# Patient Record
Sex: MALE | Race: WHITE | NOT HISPANIC OR LATINO | Employment: FULL TIME | URBAN - METROPOLITAN AREA
[De-identification: names, ages, dates, MRNs, and addresses within clinical notes are randomized per-mention and may not be internally consistent; named-entity substitution may affect disease eponyms.]

---

## 2019-02-23 ENCOUNTER — OFFICE VISIT (OUTPATIENT)
Dept: URGENT CARE | Facility: CLINIC | Age: 59
End: 2019-02-23
Payer: COMMERCIAL

## 2019-02-23 VITALS
OXYGEN SATURATION: 99 % | SYSTOLIC BLOOD PRESSURE: 130 MMHG | WEIGHT: 219 LBS | HEART RATE: 72 BPM | RESPIRATION RATE: 18 BRPM | DIASTOLIC BLOOD PRESSURE: 80 MMHG | TEMPERATURE: 98 F

## 2019-02-23 DIAGNOSIS — L25.9 CONTACT DERMATITIS, UNSPECIFIED CONTACT DERMATITIS TYPE, UNSPECIFIED TRIGGER: Primary | ICD-10-CM

## 2019-02-23 PROCEDURE — 96372 PR INJECTION,THERAP/PROPH/DIAG2ST, IM OR SUBCUT: ICD-10-PCS | Mod: S$GLB,,, | Performed by: PHYSICIAN ASSISTANT

## 2019-02-23 PROCEDURE — 99203 PR OFFICE/OUTPT VISIT, NEW, LEVL III, 30-44 MIN: ICD-10-PCS | Mod: 25,S$GLB,, | Performed by: PHYSICIAN ASSISTANT

## 2019-02-23 PROCEDURE — 96372 THER/PROPH/DIAG INJ SC/IM: CPT | Mod: S$GLB,,, | Performed by: PHYSICIAN ASSISTANT

## 2019-02-23 PROCEDURE — 99203 OFFICE O/P NEW LOW 30 MIN: CPT | Mod: 25,S$GLB,, | Performed by: PHYSICIAN ASSISTANT

## 2019-02-23 RX ORDER — ASPIRIN 81 MG/1
81 TABLET ORAL DAILY
COMMUNITY

## 2019-02-23 RX ORDER — PANTOPRAZOLE SODIUM 40 MG/1
40 TABLET, DELAYED RELEASE ORAL DAILY
Refills: 4 | COMMUNITY
Start: 2019-01-26 | End: 2019-02-23

## 2019-02-23 RX ORDER — BETAMETHASONE SODIUM PHOSPHATE AND BETAMETHASONE ACETATE 3; 3 MG/ML; MG/ML
9 INJECTION, SUSPENSION INTRA-ARTICULAR; INTRALESIONAL; INTRAMUSCULAR; SOFT TISSUE ONCE
Status: COMPLETED | OUTPATIENT
Start: 2019-02-23 | End: 2019-02-23

## 2019-02-23 RX ORDER — ROSUVASTATIN CALCIUM 20 MG/1
20 TABLET, COATED ORAL NIGHTLY
Refills: 11 | COMMUNITY
Start: 2019-01-26

## 2019-02-23 RX ORDER — TRIAMCINOLONE ACETONIDE 0.25 MG/G
CREAM TOPICAL 2 TIMES DAILY
Qty: 15 G | Refills: 0 | Status: SHIPPED | OUTPATIENT
Start: 2019-02-23 | End: 2019-03-05

## 2019-02-23 RX ADMIN — BETAMETHASONE SODIUM PHOSPHATE AND BETAMETHASONE ACETATE 9 MG: 3; 3 INJECTION, SUSPENSION INTRA-ARTICULAR; INTRALESIONAL; INTRAMUSCULAR; SOFT TISSUE at 01:02

## 2019-02-23 NOTE — PATIENT INSTRUCTIONS
· Follow up with your primary care in 2-5 days if symptoms have not improved, or you may return here.  · If you were referred to a specialist, please follow up with that specialty.  · If you were prescribed antibiotics, please take them to completion.  · If you were prescribed a narcotic or any medication with sedative effects, do not drive or operate heavy equipment or machinery while taking these medications.  · You must understand that you have received treatment at an Urgent Care facility only, and that you may be released before all of your medical problems are known or treated. Urgent Care facilities are not equipped to handle life threatening emergencies. It is recommended that you go to an Emergency Department for further evaluation of worsening or concerning symptoms, or possibly life threatening conditions as discussed.                                        If you  smoke, please stop smoking        Understanding Contact Dermatitis     A cool, moist compress can help reduce itching.     Contact dermatitis is a common type of skin rash. Its caused by something that touches the skin and makes it irritated and inflamed. It can occur on skin on any part of the body, such as the face, neck, hands, arms, and legs. Contact dermatitis is not spread from person to person.  Often, the reaction of contact dermatitis occurs 1 to 2 days after contact with the offending agent.  How to say it  SARAH-tact cuj-rqq-GY-tis   What causes contact dermatitis?  Its caused by something that irritates the skin, or that creates an allergic reaction on the skin. People can get contact dermatitis from many kinds of things. These include:  · Plant oils in poison ivy, oak, and sumac  · Chemicals in household , solvents, and glue  · Chemicals in makeup, soap, laundry detergent, perfume, acne cream, and hair products  · Certain medicines, such as neomycin, bacitracin, benzocaine, and thimerosal  · Metals such as nickel, found in  some jewelry and watch bands   · The sticky material on the back of bandages and tape (adhesive)  · Things that can cause tiny breaks in the skin, such as wood, fiberglass, metal tools, and plant thorns  · Rubber latex in surgical gloves and other medical supplies  Dermatitis can also be caused by the skin being damp for long periods of time. This can happen from washing your hands too often, or working with wet materials.  Symptoms of contact dermatitis  Symptoms can include skin that is:  · Blistered  · Burning  · Cracked  · Dry  · Itchy  · Painful  · Red  · Rough, thickened, and leathery  · Swollen  · Warm  The blisters may ooze fluid and form crusts.  Treatment for contact dermatitis  Treatment is done to help relieve itching and reduce inflammation. The rash should go away in a few days to a few weeks. Treatments include:  · Cool, moist compress. Use a clean damp cloth. Put it on the area for 20 to 30 minutes, 5 to 6 times a day for the first 3 days.  · Steroid cream or ointment. You can apply this medicine several times a day on clean skin.  · Oral corticosteroid. Your healthcare provider may prescribe this medicine if you have severe skin symptoms on a large part of your body.  Your healthcare provider may give you a steroid injection instead of pills.  · Oral antihistamine. This medicine can help reduce itching.  · Colloidal oatmeal bath. Soaking in water with colloidal oatmeal can help soothe skin.  · Plain cream, lotion, or ointment. Cream, lotion, or ointment without medicine can help to soothe and protect your skin.  Living with contact dermatitis  Talk with your healthcare provider about what may have caused your contact dermatitis. Patch testing may help you figure out what caused the rash so you can avoid further contact with it. Once you learn what caused your rash, make sure to avoid that substance. If your skin comes into contact with it again, make sure to wash your skin right away. If you cant  avoid the substance, wear gloves or other protective clothing before you touch it. Or use a cream, lotion, or ointment to protect your skin.  When to call your healthcare provider  Call your healthcare provider right away if you have any of these:  · Fever of 100.4°F (38°C) or higher, or as directed  · Symptoms that dont get better, or get worse  · New symptoms   Date Last Reviewed: 5/1/2016  © 5591-4302 [employer]. [address] [place] [address]. All rights reserved. This information is not intended as a substitute for professional medical care. Always follow your healthcare professional's instructions.

## 2019-02-23 NOTE — PROGRESS NOTES
Subjective:       Patient ID: Mitchel Joya is a 58 y.o. male.    Vitals:  weight is 99.3 kg (219 lb). His temperature is 97.9 °F (36.6 °C). His blood pressure is 130/80 and his pulse is 72. His respiration is 18 and oxygen saturation is 99%.     Chief Complaint: Rash    Rash   This is a new problem. Episode onset: 1 week. The problem has been gradually worsening since onset. Location: bilateral hands, arms, neck. The rash is characterized by blistering, itchiness, redness, swelling and pain. Associated with: kobe secret spray  Pertinent negatives include no cough, fever or sore throat. Past treatments include anti-itch cream and topical steroids. The treatment provided no relief.       Constitution: Negative for chills and fever.   HENT: Negative for facial swelling and sore throat.    Neck: Negative for painful lymph nodes.   Eyes: Negative for eye itching and eyelid swelling.   Respiratory: Negative for cough.    Musculoskeletal: Negative for joint pain and joint swelling.   Skin: Positive for rash and erythema. Negative for color change, pale, wound, abrasion, laceration, lesion, skin thickening/induration, puncture wound, abscess, avulsion and hives.   Allergic/Immunologic: Negative for environmental allergies, immunocompromised state and hives.   Hematologic/Lymphatic: Negative for swollen lymph nodes.       Objective:      Physical Exam   Constitutional: He is oriented to person, place, and time. He appears well-developed and well-nourished.   HENT:   Head: Normocephalic and atraumatic. Head is without abrasion, without contusion and without laceration.   Right Ear: External ear normal.   Left Ear: External ear normal.   Nose: Nose normal.   Mouth/Throat: Oropharynx is clear and moist.   Eyes: Conjunctivae, EOM and lids are normal. Pupils are equal, round, and reactive to light.   Neck: Trachea normal, full passive range of motion without pain and phonation normal. Neck supple.   Cardiovascular: Normal  rate, regular rhythm and normal heart sounds.   Pulmonary/Chest: Effort normal and breath sounds normal. No stridor. No respiratory distress.   Musculoskeletal: Normal range of motion.   Neurological: He is alert and oriented to person, place, and time.   Skin: Skin is warm, dry and intact. Capillary refill takes less than 2 seconds. Rash noted. No abrasion, no bruising, no burn, no ecchymosis, no laceration and no lesion noted. Rash is maculopapular. There is erythema.        Psychiatric: He has a normal mood and affect. His speech is normal and behavior is normal. Judgment and thought content normal. Cognition and memory are normal.   Nursing note and vitals reviewed.      Assessment:       1. Contact dermatitis, unspecified contact dermatitis type, unspecified trigger        Plan:         Contact dermatitis, unspecified contact dermatitis type, unspecified trigger  -     betamethasone acetate-betamethasone sodium phosphate injection 9 mg  -     triamcinolone acetonide 0.025% (KENALOG) 0.025 % cream; Apply topically 2 (two) times daily. for 10 days  Dispense: 15 g; Refill: 0      Patient Instructions     · Follow up with your primary care in 2-5 days if symptoms have not improved, or you may return here.  · If you were referred to a specialist, please follow up with that specialty.  · If you were prescribed antibiotics, please take them to completion.  · If you were prescribed a narcotic or any medication with sedative effects, do not drive or operate heavy equipment or machinery while taking these medications.  · You must understand that you have received treatment at an Urgent Care facility only, and that you may be released before all of your medical problems are known or treated. Urgent Care facilities are not equipped to handle life threatening emergencies. It is recommended that you go to an Emergency Department for further evaluation of worsening or concerning symptoms, or possibly life threatening  conditions as discussed.                                        If you  smoke, please stop smoking        Understanding Contact Dermatitis     A cool, moist compress can help reduce itching.     Contact dermatitis is a common type of skin rash. Its caused by something that touches the skin and makes it irritated and inflamed. It can occur on skin on any part of the body, such as the face, neck, hands, arms, and legs. Contact dermatitis is not spread from person to person.  Often, the reaction of contact dermatitis occurs 1 to 2 days after contact with the offending agent.  How to say it  SARAH-tact del-lgw-ES-tis   What causes contact dermatitis?  Its caused by something that irritates the skin, or that creates an allergic reaction on the skin. People can get contact dermatitis from many kinds of things. These include:  · Plant oils in poison ivy, oak, and sumac  · Chemicals in household , solvents, and glue  · Chemicals in makeup, soap, laundry detergent, perfume, acne cream, and hair products  · Certain medicines, such as neomycin, bacitracin, benzocaine, and thimerosal  · Metals such as nickel, found in some jewelry and watch bands   · The sticky material on the back of bandages and tape (adhesive)  · Things that can cause tiny breaks in the skin, such as wood, fiberglass, metal tools, and plant thorns  · Rubber latex in surgical gloves and other medical supplies  Dermatitis can also be caused by the skin being damp for long periods of time. This can happen from washing your hands too often, or working with wet materials.  Symptoms of contact dermatitis  Symptoms can include skin that is:  · Blistered  · Burning  · Cracked  · Dry  · Itchy  · Painful  · Red  · Rough, thickened, and leathery  · Swollen  · Warm  The blisters may ooze fluid and form crusts.  Treatment for contact dermatitis  Treatment is done to help relieve itching and reduce inflammation. The rash should go away in a few days to a few  weeks. Treatments include:  · Cool, moist compress. Use a clean damp cloth. Put it on the area for 20 to 30 minutes, 5 to 6 times a day for the first 3 days.  · Steroid cream or ointment. You can apply this medicine several times a day on clean skin.  · Oral corticosteroid. Your healthcare provider may prescribe this medicine if you have severe skin symptoms on a large part of your body.  Your healthcare provider may give you a steroid injection instead of pills.  · Oral antihistamine. This medicine can help reduce itching.  · Colloidal oatmeal bath. Soaking in water with colloidal oatmeal can help soothe skin.  · Plain cream, lotion, or ointment. Cream, lotion, or ointment without medicine can help to soothe and protect your skin.  Living with contact dermatitis  Talk with your healthcare provider about what may have caused your contact dermatitis. Patch testing may help you figure out what caused the rash so you can avoid further contact with it. Once you learn what caused your rash, make sure to avoid that substance. If your skin comes into contact with it again, make sure to wash your skin right away. If you cant avoid the substance, wear gloves or other protective clothing before you touch it. Or use a cream, lotion, or ointment to protect your skin.  When to call your healthcare provider  Call your healthcare provider right away if you have any of these:  · Fever of 100.4°F (38°C) or higher, or as directed  · Symptoms that dont get better, or get worse  · New symptoms   Date Last Reviewed: 5/1/2016  © 7737-8761 [employer]. [address] [place] [address], [place] [address]. All rights reserved. This information is not intended as a substitute for professional medical care. Always follow your healthcare professional's instructions.

## 2021-12-08 ENCOUNTER — TELEPHONE (OUTPATIENT)
Dept: OTOLARYNGOLOGY | Facility: CLINIC | Age: 61
End: 2021-12-08
Payer: COMMERCIAL

## 2021-12-20 ENCOUNTER — OFFICE VISIT (OUTPATIENT)
Dept: OTOLARYNGOLOGY | Facility: CLINIC | Age: 61
End: 2021-12-20
Payer: COMMERCIAL

## 2021-12-20 VITALS — HEIGHT: 69 IN | WEIGHT: 227.31 LBS | BODY MASS INDEX: 33.67 KG/M2

## 2021-12-20 DIAGNOSIS — K21.9 LPRD (LARYNGOPHARYNGEAL REFLUX DISEASE): ICD-10-CM

## 2021-12-20 DIAGNOSIS — Z79.01 CHRONIC ANTICOAGULATION: ICD-10-CM

## 2021-12-20 DIAGNOSIS — E66.9 OBESITY (BMI 30.0-34.9): ICD-10-CM

## 2021-12-20 DIAGNOSIS — G47.33 OSA (OBSTRUCTIVE SLEEP APNEA): Primary | ICD-10-CM

## 2021-12-20 PROCEDURE — 99204 PR OFFICE/OUTPT VISIT, NEW, LEVL IV, 45-59 MIN: ICD-10-PCS | Mod: 25,S$GLB,, | Performed by: OTOLARYNGOLOGY

## 2021-12-20 PROCEDURE — 99204 OFFICE O/P NEW MOD 45 MIN: CPT | Mod: 25,S$GLB,, | Performed by: OTOLARYNGOLOGY

## 2021-12-20 PROCEDURE — 31575 DIAGNOSTIC LARYNGOSCOPY: CPT | Mod: S$GLB,,, | Performed by: OTOLARYNGOLOGY

## 2021-12-20 PROCEDURE — 31575 PR LARYNGOSCOPY, FLEXIBLE; DIAGNOSTIC: ICD-10-PCS | Mod: S$GLB,,, | Performed by: OTOLARYNGOLOGY

## 2021-12-20 PROCEDURE — 99999 PR PBB SHADOW E&M-EST. PATIENT-LVL II: CPT | Mod: PBBFAC,,, | Performed by: OTOLARYNGOLOGY

## 2021-12-20 PROCEDURE — 99999 PR PBB SHADOW E&M-EST. PATIENT-LVL II: ICD-10-PCS | Mod: PBBFAC,,, | Performed by: OTOLARYNGOLOGY

## 2021-12-20 RX ORDER — PANTOPRAZOLE SODIUM 40 MG/1
40 TABLET, DELAYED RELEASE ORAL DAILY
Qty: 30 TABLET | Refills: 11 | Status: SHIPPED | OUTPATIENT
Start: 2021-12-20 | End: 2022-12-20

## 2021-12-20 RX ORDER — RIVAROXABAN 15 MG-20MG
15 KIT ORAL 2 TIMES DAILY
COMMUNITY
Start: 2021-10-19

## 2021-12-21 PROBLEM — G47.33 OSA (OBSTRUCTIVE SLEEP APNEA): Status: ACTIVE | Noted: 2021-12-21

## 2021-12-21 PROBLEM — Z79.01 CHRONIC ANTICOAGULATION: Status: ACTIVE | Noted: 2021-12-21

## 2021-12-21 PROBLEM — E66.9 OBESITY (BMI 30.0-34.9): Status: ACTIVE | Noted: 2021-12-21

## 2021-12-21 PROBLEM — E66.811 OBESITY (BMI 30.0-34.9): Status: ACTIVE | Noted: 2021-12-21

## 2021-12-21 PROBLEM — K21.9 LPRD (LARYNGOPHARYNGEAL REFLUX DISEASE): Status: ACTIVE | Noted: 2021-12-21

## 2021-12-26 ENCOUNTER — TELEPHONE (OUTPATIENT)
Dept: PULMONOLOGY | Facility: HOSPITAL | Age: 61
End: 2021-12-26
Payer: COMMERCIAL

## 2023-08-23 ENCOUNTER — TELEPHONE (OUTPATIENT)
Dept: OTOLARYNGOLOGY | Facility: CLINIC | Age: 63
End: 2023-08-23
Payer: COMMERCIAL

## 2023-08-23 NOTE — TELEPHONE ENCOUNTER
Spoke to pt and informed Dr. Pereira is no longer with [facility]. Offered to schedule appt with another MD. Pt states he will stick with Dr. Pereira

## 2023-08-23 NOTE — TELEPHONE ENCOUNTER
----- Message from Jo Lee sent at 8/23/2023 12:47 PM CDT -----  Contact: pt  Pt is calling in regard needing the dr or nurse call him about his past visit and would like to discuss possible surgery.  Please call him back at  [phone] or work# [phone] thanks/mpd

## 2023-08-23 NOTE — TELEPHONE ENCOUNTER
----- Message from Sophia Blackmon sent at 8/23/2023  1:18 PM CDT -----  Type:  Patient Returning Call    Who Called:JASPAL JAY [[id number]]  Who Left Message for Patient:Yahaira  Does the patient know what this is regarding?:YES  Would the patient rather a call back or a response via [facility]? No   Best Call Back Number:.Telephone Information:  Mobile          [phone]      Additional Information: Pt returning call

## 2024-12-30 ENCOUNTER — TELEPHONE (OUTPATIENT)
Dept: HEMATOLOGY/ONCOLOGY | Facility: CLINIC | Age: 64
End: 2024-12-30
Payer: COMMERCIAL

## 2024-12-30 DIAGNOSIS — C90.00 MULTIPLE MYELOMA: Primary | ICD-10-CM

## 2024-12-30 NOTE — TELEPHONE ENCOUNTER
Called and spoke to Patient .  Appointment scheduled on 1/29/2025 with Dr Hemphill.  All questions and concerns addressed.   Provided my name and direct number and instructed Patient  to call with any questions and/or concerns.       VIKCI PenaN, RN-BC  BMT Nurse Navigator  [facility] [address], [place], [place] [address]  _________________________  o [phone]

## 2024-12-30 NOTE — TELEPHONE ENCOUNTER
----- Message from Sony Lobojeana sent at 12/26/2024  3:23 PM CST -----  Regarding: RE: MBP ref by Dr Rani Chance MM  I have Verified medical and transplant benefit for patient for John rosado.      Yamiljeana  ----- Message -----  From: Louis Marie RN  Sent: 12/26/2024   3:18 PM CST  To: Sony Rubén  Subject: FW: MBP ref by Dr Rani Chance MM                  Please verify car t tx benefits  ----- Message -----  From: Chris Topete  Sent: 12/26/2024   9:01 AM CST  To: Louis Marie RN  Subject: MBP ref by Dr Rani Chance MM                      Referral and provider notes w/labs  in Media       Requested via phone:  -chemo treatment plan   -pathology report   - radiology reports

## 2024-12-30 NOTE — NURSING
Oncology Navigation   Intake  Cancer Type: Myeloma  Type of Referral: External  Date of Referral: 12/28/24  Initial Nurse Navigator Contact: 12/30/24  Referral to Initial Contact Timeline (days): 2     Treatment                              Acuity      Follow Up  No follow-ups on file.

## 2025-01-09 ENCOUNTER — LAB VISIT (OUTPATIENT)
Dept: LAB | Facility: HOSPITAL | Age: 65
End: 2025-01-09
Attending: INTERNAL MEDICINE
Payer: COMMERCIAL

## 2025-01-09 DIAGNOSIS — C90.00 MULTIPLE MYELOMA: ICD-10-CM

## 2025-01-09 DIAGNOSIS — C90.00 MULTIPLE MYELOMA: Primary | ICD-10-CM

## 2025-01-09 PROCEDURE — 88325 CONSLTJ COMPRE RVW REC REPRT: CPT | Performed by: PATHOLOGY

## 2025-01-09 PROCEDURE — 88325 CONSLTJ COMPRE RVW REC REPRT: CPT | Mod: ,,, | Performed by: PATHOLOGY

## 2025-01-13 LAB
COMMENT: NORMAL
FINAL PATHOLOGIC DIAGNOSIS: NORMAL
GROSS: NORMAL
Lab: NORMAL
MICROSCOPIC EXAM: NORMAL

## 2025-01-29 ENCOUNTER — TELEPHONE (OUTPATIENT)
Dept: HEMATOLOGY/ONCOLOGY | Facility: CLINIC | Age: 65
End: 2025-01-29
Payer: COMMERCIAL

## 2025-01-29 NOTE — TELEPHONE ENCOUNTER
Pt called to ask questions about his visit with Dr Hemphill tomorrow.  All questions and concerns addressed.

## 2025-01-30 ENCOUNTER — LAB VISIT (OUTPATIENT)
Dept: LAB | Facility: HOSPITAL | Age: 65
End: 2025-01-30
Attending: INTERNAL MEDICINE
Payer: COMMERCIAL

## 2025-01-30 ENCOUNTER — OFFICE VISIT (OUTPATIENT)
Dept: HEMATOLOGY/ONCOLOGY | Facility: CLINIC | Age: 65
End: 2025-01-30
Payer: COMMERCIAL

## 2025-01-30 VITALS
SYSTOLIC BLOOD PRESSURE: 119 MMHG | OXYGEN SATURATION: 96 % | BODY MASS INDEX: 34.29 KG/M2 | HEART RATE: 69 BPM | DIASTOLIC BLOOD PRESSURE: 67 MMHG | HEIGHT: 69 IN | WEIGHT: 231.5 LBS | TEMPERATURE: 99 F

## 2025-01-30 DIAGNOSIS — C90.00 MULTIPLE MYELOMA NOT HAVING ACHIEVED REMISSION: ICD-10-CM

## 2025-01-30 DIAGNOSIS — D84.81 IMMUNODEFICIENCY DUE TO CONDITIONS CLASSIFIED ELSEWHERE: ICD-10-CM

## 2025-01-30 DIAGNOSIS — C90.00 MULTIPLE MYELOMA NOT HAVING ACHIEVED REMISSION: Primary | ICD-10-CM

## 2025-01-30 DIAGNOSIS — C90.00 MULTIPLE MYELOMA: ICD-10-CM

## 2025-01-30 DIAGNOSIS — Z76.82 STEM CELL TRANSPLANT CANDIDATE: ICD-10-CM

## 2025-01-30 LAB
ABO + RH BLD: NORMAL
ALBUMIN SERPL BCP-MCNC: 3.4 G/DL (ref 3.5–5.2)
ALP SERPL-CCNC: 90 U/L (ref 40–150)
ALT SERPL W/O P-5'-P-CCNC: 63 U/L (ref 10–44)
ANION GAP SERPL CALC-SCNC: 6 MMOL/L (ref 8–16)
AST SERPL-CCNC: 34 U/L (ref 10–40)
BASOPHILS # BLD AUTO: 0.03 K/UL (ref 0–0.2)
BASOPHILS NFR BLD: 0.5 % (ref 0–1.9)
BILIRUB SERPL-MCNC: 0.3 MG/DL (ref 0.1–1)
BLD GP AB SCN CELLS X3 SERPL QL: NORMAL
BUN SERPL-MCNC: 20 MG/DL (ref 8–23)
CALCIUM SERPL-MCNC: 8.9 MG/DL (ref 8.7–10.5)
CHLORIDE SERPL-SCNC: 108 MMOL/L (ref 95–110)
CO2 SERPL-SCNC: 27 MMOL/L (ref 23–29)
CREAT SERPL-MCNC: 0.8 MG/DL (ref 0.5–1.4)
DIFFERENTIAL METHOD BLD: ABNORMAL
EOSINOPHIL # BLD AUTO: 0.3 K/UL (ref 0–0.5)
EOSINOPHIL NFR BLD: 4.2 % (ref 0–8)
ERYTHROCYTE [DISTWIDTH] IN BLOOD BY AUTOMATED COUNT: 16 % (ref 11.5–14.5)
EST. GFR  (NO RACE VARIABLE): >60 ML/MIN/1.73 M^2
GLUCOSE SERPL-MCNC: 89 MG/DL (ref 70–110)
HCT VFR BLD AUTO: 33.6 % (ref 40–54)
HGB BLD-MCNC: 10.9 G/DL (ref 14–18)
IGA SERPL-MCNC: 22 MG/DL (ref 40–350)
IGG SERPL-MCNC: 783 MG/DL (ref 650–1600)
IGM SERPL-MCNC: 29 MG/DL (ref 50–300)
IMM GRANULOCYTES # BLD AUTO: 0.02 K/UL (ref 0–0.04)
IMM GRANULOCYTES NFR BLD AUTO: 0.3 % (ref 0–0.5)
LDH SERPL L TO P-CCNC: 214 U/L (ref 110–260)
LYMPHOCYTES # BLD AUTO: 1.5 K/UL (ref 1–4.8)
LYMPHOCYTES NFR BLD: 23.1 % (ref 18–48)
MAGNESIUM SERPL-MCNC: 2.1 MG/DL (ref 1.6–2.6)
MCH RBC QN AUTO: 30.7 PG (ref 27–31)
MCHC RBC AUTO-ENTMCNC: 32.4 G/DL (ref 32–36)
MCV RBC AUTO: 95 FL (ref 82–98)
MONOCYTES # BLD AUTO: 0.8 K/UL (ref 0.3–1)
MONOCYTES NFR BLD: 11.8 % (ref 4–15)
NEUTROPHILS # BLD AUTO: 3.8 K/UL (ref 1.8–7.7)
NEUTROPHILS NFR BLD: 60.1 % (ref 38–73)
NRBC BLD-RTO: 0 /100 WBC
PHOSPHATE SERPL-MCNC: 3.9 MG/DL (ref 2.7–4.5)
PLATELET # BLD AUTO: 171 K/UL (ref 150–450)
PMV BLD AUTO: 12.6 FL (ref 9.2–12.9)
POTASSIUM SERPL-SCNC: 3.9 MMOL/L (ref 3.5–5.1)
PROT SERPL-MCNC: 6.5 G/DL (ref 6–8.4)
RBC # BLD AUTO: 3.55 M/UL (ref 4.6–6.2)
SODIUM SERPL-SCNC: 141 MMOL/L (ref 136–145)
SPECIMEN OUTDATE: NORMAL
WBC # BLD AUTO: 6.36 K/UL (ref 3.9–12.7)

## 2025-01-30 PROCEDURE — 85025 COMPLETE CBC W/AUTO DIFF WBC: CPT | Performed by: INTERNAL MEDICINE

## 2025-01-30 PROCEDURE — 86334 IMMUNOFIX E-PHORESIS SERUM: CPT | Performed by: INTERNAL MEDICINE

## 2025-01-30 PROCEDURE — 86334 IMMUNOFIX E-PHORESIS SERUM: CPT | Mod: 26,,, | Performed by: PATHOLOGY

## 2025-01-30 PROCEDURE — 3288F FALL RISK ASSESSMENT DOCD: CPT | Mod: CPTII,S$GLB,, | Performed by: INTERNAL MEDICINE

## 2025-01-30 PROCEDURE — 3074F SYST BP LT 130 MM HG: CPT | Mod: CPTII,S$GLB,, | Performed by: INTERNAL MEDICINE

## 2025-01-30 PROCEDURE — 83615 LACTATE (LD) (LDH) ENZYME: CPT | Performed by: INTERNAL MEDICINE

## 2025-01-30 PROCEDURE — 84165 PROTEIN E-PHORESIS SERUM: CPT | Mod: 26,,, | Performed by: PATHOLOGY

## 2025-01-30 PROCEDURE — 36415 COLL VENOUS BLD VENIPUNCTURE: CPT | Performed by: INTERNAL MEDICINE

## 2025-01-30 PROCEDURE — 3008F BODY MASS INDEX DOCD: CPT | Mod: CPTII,S$GLB,, | Performed by: INTERNAL MEDICINE

## 2025-01-30 PROCEDURE — 1160F RVW MEDS BY RX/DR IN RCRD: CPT | Mod: CPTII,S$GLB,, | Performed by: INTERNAL MEDICINE

## 2025-01-30 PROCEDURE — 80053 COMPREHEN METABOLIC PANEL: CPT | Performed by: INTERNAL MEDICINE

## 2025-01-30 PROCEDURE — 82784 ASSAY IGA/IGD/IGG/IGM EACH: CPT | Mod: 59 | Performed by: INTERNAL MEDICINE

## 2025-01-30 PROCEDURE — 99999 PR PBB SHADOW E&M-EST. PATIENT-LVL IV: CPT | Mod: PBBFAC,,, | Performed by: INTERNAL MEDICINE

## 2025-01-30 PROCEDURE — 1101F PT FALLS ASSESS-DOCD LE1/YR: CPT | Mod: CPTII,S$GLB,, | Performed by: INTERNAL MEDICINE

## 2025-01-30 PROCEDURE — 83521 IG LIGHT CHAINS FREE EACH: CPT | Mod: 59 | Performed by: INTERNAL MEDICINE

## 2025-01-30 PROCEDURE — 99205 OFFICE O/P NEW HI 60 MIN: CPT | Mod: S$GLB,,, | Performed by: INTERNAL MEDICINE

## 2025-01-30 PROCEDURE — 3078F DIAST BP <80 MM HG: CPT | Mod: CPTII,S$GLB,, | Performed by: INTERNAL MEDICINE

## 2025-01-30 PROCEDURE — 86850 RBC ANTIBODY SCREEN: CPT | Performed by: INTERNAL MEDICINE

## 2025-01-30 PROCEDURE — 1125F AMNT PAIN NOTED PAIN PRSNT: CPT | Mod: CPTII,S$GLB,, | Performed by: INTERNAL MEDICINE

## 2025-01-30 PROCEDURE — 1159F MED LIST DOCD IN RCRD: CPT | Mod: CPTII,S$GLB,, | Performed by: INTERNAL MEDICINE

## 2025-01-30 PROCEDURE — 84100 ASSAY OF PHOSPHORUS: CPT | Performed by: INTERNAL MEDICINE

## 2025-01-30 PROCEDURE — 84165 PROTEIN E-PHORESIS SERUM: CPT | Performed by: INTERNAL MEDICINE

## 2025-01-30 PROCEDURE — 83735 ASSAY OF MAGNESIUM: CPT | Performed by: INTERNAL MEDICINE

## 2025-01-30 RX ORDER — ONDANSETRON 8 MG/1
8 TABLET, ORALLY DISINTEGRATING ORAL
COMMUNITY
Start: 2025-01-07

## 2025-01-30 RX ORDER — TIZANIDINE 4 MG/1
4 TABLET ORAL NIGHTLY PRN
COMMUNITY
Start: 2024-12-16

## 2025-01-30 RX ORDER — MULTIVITAMIN
TABLET ORAL
COMMUNITY
Start: 2024-02-09

## 2025-01-30 RX ORDER — VALACYCLOVIR HYDROCHLORIDE 500 MG/1
500 TABLET, FILM COATED ORAL
COMMUNITY

## 2025-01-30 RX ORDER — MELOXICAM 15 MG/1
1 TABLET ORAL DAILY
COMMUNITY
Start: 2025-01-12

## 2025-01-30 RX ORDER — PROMETHAZINE HYDROCHLORIDE 25 MG/1
25 TABLET ORAL 2 TIMES DAILY PRN
COMMUNITY
Start: 2025-01-07

## 2025-01-30 RX ORDER — DEXAMETHASONE 4 MG/1
TABLET ORAL
COMMUNITY

## 2025-01-30 RX ORDER — LENALIDOMIDE 20 MG/1
CAPSULE ORAL
COMMUNITY

## 2025-01-30 RX ORDER — HYDROCODONE BITARTRATE AND ACETAMINOPHEN 10; 325 MG/1; MG/1
TABLET ORAL
COMMUNITY
Start: 2024-10-21

## 2025-01-30 RX ORDER — LEVOTHYROXINE SODIUM 88 UG/1
1 TABLET ORAL
COMMUNITY
Start: 2024-09-19

## 2025-01-30 NOTE — PROGRESS NOTES
Section of Hematology and Stem Cell Transplantation  New Patient Consult     Date of visit: 1/30/25  Referred by:  Dr. Mark Byrne*  Reason for visit: Autologous stem cell transplant candidate    Oncologic History:     Primary Oncologic Diagnosis: Multiple myeloma, standard risk, R-ISS stage  NA    Presentation: Anemia  Initial workup:  Labs:  Hemoglobin: NA  Creatinine: NA  Calcium: NA  Serologic studies:  POOJA: M spike of 3.2  SPEP: NA  Free light chains: Ratio >400  Immunoglobulins: NA  PET/CT:  10/15/24: PET CT with widely metastatic lytic bone lesions in the left scapula, right glenoid, left posterior 5th rib, right posterior 8th rib, and patchy lesions throughout spine and pelvis   Bone marrow biopsy:  10/14/24: Sparse maturing myelopoiesis and erythropoiesis. 95% plasma cells.   FISH: Clonal plasma cell population (42%) with aberrant expression of CD56 and , cytoplasmic lambda light chain restriction. Gain of chromosome 9, 15, 11q. Gain of 1q21. Loss of 4p/FGFR3 and 16q/MAF  Prognostic factors:  B2M: NA  LDH:  NA  Albumin: NA  Treatment history:  10/29/24: C1 Korina Vrd (notes state velcade has been held due to neuropathy and lenalidomide has been held due to cytopenias)   11/15/24: Palliative radiation to right hip    Restaging:  Has not been done yet     History of Present Ilness:   Mitchel Joya (Mitchel) is a pleasant 64 y.o.male with multiple myeloma who presents as initial consultation to discuss autologous stem cell transplant. Oncologic history is detailed above. Patient has been doing well with his Korina Vrd treatments so far. He had the lenalidomide held briefly for thrombocytopenia which has improved. For his most recent treatment the bortezomib was held due to neuropathy. He does not have neuropathy in his hands but has it in his feet bilaterally which is manageable at this time. There is some discomfort but it is not painful. His medical history is significant for a cardiac stent that was  placed around . He lives in [place] with his partner and is still actively working. He has some fatigue with exertion but is able to complete all ADLS's.     PAST MEDICAL HISTORY:   Past Medical History:   Diagnosis Date    Hyperlipidemia        PAST SURGICAL HISTORY:   Past Surgical History:   Procedure Laterality Date    CHOLECYSTECTOMY      heart stint      KNEE SURGERY      left    SHOULDER SURGERY      SPINE SURGERY         PAST SOCIAL HISTORY:  Social History     Tobacco Use    Smoking status: Never    Smokeless tobacco: Never   Substance Use Topics    Alcohol use: Yes     Comment: occ       FAMILY HISTORY:  Family History   Problem Relation Name Age of Onset    No Known Problems Mother      Heart disease Father         CURRENT MEDICATIONS:   Current Outpatient Medications   Medication Sig    aspirin (ECOTRIN) 81 MG EC tablet Take 81 mg by mouth once daily.    calcium carbonate/vitamin D3 (VITAMIN D-3 ORAL) VITAMIN D3, [RxNorm: 0]    dexAMETHasone (DECADRON) 4 MG Tab SMARTSI Tablet(s) By Mouth Once a Week    HYDROcodone-acetaminophen (NORCO)  mg per tablet     levothyroxine (SYNTHROID) 88 MCG tablet 1 tablet.    meloxicam (MOBIC) 15 MG tablet Take 1 tablet by mouth once daily.    multivitamin (THERAGRAN) per tablet multivitamin tablet, [RxNorm: 0]    ondansetron (ZOFRAN-ODT) 8 MG TbDL Take 8 mg by mouth.    pantoprazole (PROTONIX) 40 MG tablet Take 1 tablet (40 mg total) by mouth once daily.    promethazine (PHENERGAN) 25 MG tablet Take 25 mg by mouth 2 (two) times daily as needed.    REVLIMID 20 mg Cap Take by mouth.    rosuvastatin (CRESTOR) 20 MG tablet Take 20 mg by mouth every evening.    tiZANidine (ZANAFLEX) 4 MG tablet Take 4 mg by mouth nightly as needed.    valACYclovir (VALTREX) 500 MG tablet Take 500 mg by mouth.    XARELTO DVT-PE TREAT 30D START 15 mg (42)- 20 mg (9) tablet dose pack Take 15 mg by mouth 2 (two) times a day.    triamcinolone acetonide 0.025% (KENALOG) 0.025 %  cream Apply topically 2 (two) times daily. for 10 days (Patient not taking: Reported on 1/30/2025)     No current facility-administered medications for this visit.       ALLERGIES:   Review of patient's allergies indicates:  No Known Allergies    Review of Systems:     Pertinent positives and negatives included in the HPI. Otherwise a complete review of systems is negative.    Physical Exam:     Vitals:    01/30/25 1359   BP: 119/67   Pulse: 69   Temp: 99 °F (37.2 °C)       General: Appears well, NAD  HEENT: MMM, no OP lesions  Pulmonary: CTAB, no increased work of breathing, no W/R/C  Cardiovascular: S1S2 normal, RRR, no M/R/G  Abdominal: Soft, NT, ND, BS+, no HSM  Extremities: No C/C/E  Neurological: AAOx4, grossly normal, no focal deficits  Dermatologic: No appreciable rashes or lesions  Lymphatic: No cervical, axillary, or inguinal lymphadenopathy     ECOG Performance Status: (foot note - ECOG PS provided by Eastern Cooperative Oncology Group) 1 - Symptomatic but completely ambulatory    Karnofsky Performance Score:  90%- Able to Carry on Normal Activity: Minor Symptoms of Disease    Labs:   Lab Results   Component Value Date    WBC 6.36 01/30/2025    RBC 3.55 (L) 01/30/2025    HGB 10.9 (L) 01/30/2025    HCT 33.6 (L) 01/30/2025    MCV 95 01/30/2025    MCH 30.7 01/30/2025    MCHC 32.4 01/30/2025    RDW 16.0 (H) 01/30/2025     01/30/2025    MPV 12.6 01/30/2025    GRAN 3.8 01/30/2025    GRAN 60.1 01/30/2025    LYMPH 1.5 01/30/2025    LYMPH 23.1 01/30/2025    MONO 0.8 01/30/2025    MONO 11.8 01/30/2025    EOS 0.3 01/30/2025    BASO 0.03 01/30/2025    EOSINOPHIL 4.2 01/30/2025    BASOPHIL 0.5 01/30/2025       CMP  Sodium   Date Value Ref Range Status   01/30/2025 141 136 - 145 mmol/L Final     Potassium   Date Value Ref Range Status   01/30/2025 3.9 3.5 - 5.1 mmol/L Final     Chloride   Date Value Ref Range Status   01/30/2025 108 95 - 110 mmol/L Final     CO2   Date Value Ref Range Status   01/30/2025 27 23 -  29 mmol/L Final     Glucose   Date Value Ref Range Status   01/30/2025 89 70 - 110 mg/dL Final     BUN   Date Value Ref Range Status   01/30/2025 20 8 - 23 mg/dL Final     Creatinine   Date Value Ref Range Status   01/30/2025 0.8 0.5 - 1.4 mg/dL Final     Calcium   Date Value Ref Range Status   01/30/2025 8.9 8.7 - 10.5 mg/dL Final     Total Protein   Date Value Ref Range Status   01/30/2025 6.5 6.0 - 8.4 g/dL Final     Albumin   Date Value Ref Range Status   01/30/2025 3.4 (L) 3.5 - 5.2 g/dL Final     Total Bilirubin   Date Value Ref Range Status   01/30/2025 0.3 0.1 - 1.0 mg/dL Final     Comment:     For infants and newborns, interpretation of results should be based  on gestational age, weight and in agreement with clinical  observations.    Premature Infant recommended reference ranges:  Up to 24 hours.............<8.0 mg/dL  Up to 48 hours............<12.0 mg/dL  3-5 days..................<15.0 mg/dL  6-29 days.................<15.0 mg/dL       Alkaline Phosphatase   Date Value Ref Range Status   01/30/2025 90 40 - 150 U/L Final     AST   Date Value Ref Range Status   01/30/2025 34 10 - 40 U/L Final     ALT   Date Value Ref Range Status   01/30/2025 63 (H) 10 - 44 U/L Final     Anion Gap   Date Value Ref Range Status   01/30/2025 6 (L) 8 - 16 mmol/L Final       Imaging:  Reviewed     Pathology:  Reviewed      Assessment and Plan:   Mitchel DELANEY Toan (Mitchel) is a pleasant 64 y.o.male with multiple myeloma who presents as initial consultation to discuss autologous stem cell transplant.    Autologous stem cell transplant candidate:  We discussed the role for autologous stem cell transplantation in multiple myeloma as a means to improve progression free survival (not curative) and provide prolonged disease control. We had an extensive discussion about the rationale, logistics, risks, and benefits. We reviewed the requirement to stay in the [place] area for 30 days with a caregiver at all times. We discussed the  risks, including infection, organ toxicity, relapse of disease, and secondary cancers. We reviewed the need for maintenance therapy starting around day 100.  - Coordinator: TBOBINNA  - Conditioning Regimen: Melphalan 200 mg/m2  - Cell Dose: 5-10 x 10^6 CD34/kg  - Maintenance: TBD  - Caregiver: Partner   - Housing: [facility] need to stay at [facility]     Multiple myeloma, standard risk, R-ISS stage  NA :  His oncologic history is detailed above. He remains on treatment with Korina VRd. Current disease status is not reassessed.  - Patient has standard risk myeloma but has a couple adverse cytogenetics such as a gain of 1q21  - Patient should be in the middle of C3 Korina Vrd, will get repeat myeloma labs today but patient states he had them recently and they revealed a good response to treatment   - Will have patient meet with transplants coordinator today to discuss the steps needed before transplant including dental clearance   - Depending on when a transplant can be scheduled will determine the number of Korina VRD cycles he will need and if he needs any consolidation/maintenance to bridge to transplant   - Recommend zoledronic acid for bone protection after dental clearance     Immunodeficiency due to conditions classified elsewhere:  Continue valacyclovir     At high risk for thrombosis:  Continue aspirin.    Orders/Follow Up:           Route Chart for Scheduling    BMT Chart Routing      Follow up with physician 4 weeks.   Follow up with AXEL    Provider visit type    Infusion scheduling note    Injection scheduling note    Labs    Imaging    Pharmacy appointment    Other referrals                       Total time of this visit was 60 minutes, including time spent face to face with patient and/or via video/audio, and also in preparing for today's visit for MDM and documentation. (Medical Decision Making, including consideration of possible diagnoses, management options, complex medical record review, review of diagnostic tests  and information, consideration and discussion of significant complications based on comorbidities, and discussion with providers involved with the care of the patient). Greater than 50% was spent face to face with the patient counseling and coordinating care.    James Robles MD  Hematology/Oncology Fellow PGY-V

## 2025-01-31 LAB
ALBUMIN SERPL ELPH-MCNC: 3.64 G/DL (ref 3.35–5.55)
ALPHA1 GLOB SERPL ELPH-MCNC: 0.29 G/DL (ref 0.17–0.41)
ALPHA2 GLOB SERPL ELPH-MCNC: 0.76 G/DL (ref 0.43–0.99)
B-GLOBULIN SERPL ELPH-MCNC: 0.7 G/DL (ref 0.5–1.1)
GAMMA GLOB SERPL ELPH-MCNC: 0.72 G/DL (ref 0.67–1.58)
INTERPRETATION SERPL IFE-IMP: NORMAL
KAPPA LC SER QL IA: 0.66 MG/DL (ref 0.33–1.94)
KAPPA LC/LAMBDA SER IA: 0.4 (ref 0.26–1.65)
LAMBDA LC SER QL IA: 1.66 MG/DL (ref 0.57–2.63)
PROT SERPL-MCNC: 6.1 G/DL (ref 6–8.4)

## 2025-02-01 LAB
PATHOLOGIST INTERPRETATION IFE: NORMAL
PATHOLOGIST INTERPRETATION SPE: NORMAL

## 2025-02-05 ENCOUNTER — EDUCATION (OUTPATIENT)
Dept: HEMATOLOGY/ONCOLOGY | Facility: CLINIC | Age: 65
End: 2025-02-05
Payer: COMMERCIAL

## 2025-02-05 NOTE — PROGRESS NOTES
Met with patient and spouse to discuss autologous stem cell transplant. Discussed with patient pre-screening requirements. Educated patient on the evaluation process, mobilization, line placement, stem cell collection, and the hospitalization including current visitor's policy. Reviewed with patient lodging and caregiver requirements following transplant as well as the need for follow-up and immunizations. Reading material provided; patient instructed to reach out with further questions. Patient will need the following for transplant evaluation: dental clearance and colonoscopy.

## 2025-02-05 NOTE — PROGRESS NOTES
Route Chart for Scheduling    BMT Chart Routing      Follow up with physician 2 months.   Follow up with AXEL    Provider visit type    Infusion scheduling note    Injection scheduling note    Labs CMP, CBC, free light chains, LDH, immunoglobulins, SPEP, phosphorus and magnesium   Scheduling:  Preferred lab:  Lab interval:  prior to visit   Imaging    Pharmacy appointment    Other referrals

## 2025-02-06 DIAGNOSIS — Z76.82 STEM CELL TRANSPLANT CANDIDATE: ICD-10-CM

## 2025-02-06 DIAGNOSIS — C90.00 MULTIPLE MYELOMA NOT HAVING ACHIEVED REMISSION: Primary | ICD-10-CM

## 2025-02-07 DIAGNOSIS — C90.00 MULTIPLE MYELOMA NOT HAVING ACHIEVED REMISSION: ICD-10-CM

## 2025-02-07 DIAGNOSIS — Z76.82 STEM CELL TRANSPLANT CANDIDATE: Primary | ICD-10-CM

## 2025-02-26 ENCOUNTER — PATIENT MESSAGE (OUTPATIENT)
Dept: HEMATOLOGY/ONCOLOGY | Facility: CLINIC | Age: 65
End: 2025-02-26
Payer: COMMERCIAL

## 2025-02-28 ENCOUNTER — PATIENT MESSAGE (OUTPATIENT)
Dept: SURGERY | Facility: CLINIC | Age: 65
End: 2025-02-28
Payer: COMMERCIAL

## 2025-02-28 DIAGNOSIS — Z76.82 STEM CELL TRANSPLANT CANDIDATE: Primary | ICD-10-CM

## 2025-03-05 ENCOUNTER — HOSPITAL ENCOUNTER (OUTPATIENT)
Facility: HOSPITAL | Age: 65
Discharge: HOME | End: 2025-03-05
Attending: INTERNAL MEDICINE | Admitting: INTERNAL MEDICINE
Payer: COMMERCIAL

## 2025-03-05 ENCOUNTER — ANESTHESIA (OUTPATIENT)
Dept: ENDOSCOPY | Facility: HOSPITAL | Age: 65
End: 2025-03-05
Payer: COMMERCIAL

## 2025-03-05 ENCOUNTER — ANESTHESIA EVENT (OUTPATIENT)
Dept: ENDOSCOPY | Facility: HOSPITAL | Age: 65
End: 2025-03-05
Payer: COMMERCIAL

## 2025-03-05 VITALS
HEIGHT: 69 IN | SYSTOLIC BLOOD PRESSURE: 122 MMHG | TEMPERATURE: 98 F | HEART RATE: 68 BPM | OXYGEN SATURATION: 96 % | RESPIRATION RATE: 17 BRPM | WEIGHT: 220 LBS | DIASTOLIC BLOOD PRESSURE: 70 MMHG | BODY MASS INDEX: 32.58 KG/M2

## 2025-03-05 DIAGNOSIS — C90.00 MULTIPLE MYELOMA: ICD-10-CM

## 2025-03-05 PROCEDURE — 88184 FLOWCYTOMETRY/ TC 1 MARKER: CPT | Mod: 91 | Performed by: NURSE PRACTITIONER

## 2025-03-05 PROCEDURE — 88271 CYTOGENETICS DNA PROBE: CPT | Performed by: NURSE PRACTITIONER

## 2025-03-05 PROCEDURE — 63600175 PHARM REV CODE 636 W HCPCS: Performed by: REGISTERED NURSE

## 2025-03-05 PROCEDURE — 37000008 HC ANESTHESIA 1ST 15 MINUTES: Performed by: INTERNAL MEDICINE

## 2025-03-05 PROCEDURE — 37000009 HC ANESTHESIA EA ADD 15 MINS: Performed by: INTERNAL MEDICINE

## 2025-03-05 PROCEDURE — 38222 DX BONE MARROW BX & ASPIR: CPT | Performed by: INTERNAL MEDICINE

## 2025-03-05 PROCEDURE — 88184 FLOWCYTOMETRY/ TC 1 MARKER: CPT | Mod: 59 | Performed by: PATHOLOGY

## 2025-03-05 PROCEDURE — 25000003 PHARM REV CODE 250: Performed by: REGISTERED NURSE

## 2025-03-05 PROCEDURE — 88185 FLOWCYTOMETRY/TC ADD-ON: CPT | Mod: 59 | Performed by: PATHOLOGY

## 2025-03-05 PROCEDURE — 88185 FLOWCYTOMETRY/TC ADD-ON: CPT | Performed by: NURSE PRACTITIONER

## 2025-03-05 PROCEDURE — 88299 UNLISTED CYTOGENETIC STUDY: CPT | Performed by: NURSE PRACTITIONER

## 2025-03-05 RX ORDER — FENTANYL CITRATE 50 UG/ML
25 INJECTION, SOLUTION INTRAMUSCULAR; INTRAVENOUS EVERY 5 MIN PRN
Status: DISCONTINUED | OUTPATIENT
Start: 2025-03-05 | End: 2025-03-05 | Stop reason: HOSPADM

## 2025-03-05 RX ORDER — DEXMEDETOMIDINE HYDROCHLORIDE 100 UG/ML
INJECTION, SOLUTION INTRAVENOUS
Status: DISCONTINUED | OUTPATIENT
Start: 2025-03-05 | End: 2025-03-05

## 2025-03-05 RX ORDER — GLUCAGON 1 MG
1 KIT INJECTION
Status: DISCONTINUED | OUTPATIENT
Start: 2025-03-05 | End: 2025-03-05 | Stop reason: HOSPADM

## 2025-03-05 RX ORDER — PROPOFOL 10 MG/ML
VIAL (ML) INTRAVENOUS
Status: DISCONTINUED | OUTPATIENT
Start: 2025-03-05 | End: 2025-03-05

## 2025-03-05 RX ORDER — SODIUM CHLORIDE 0.9 % (FLUSH) 0.9 %
3 SYRINGE (ML) INJECTION
Status: DISCONTINUED | OUTPATIENT
Start: 2025-03-05 | End: 2025-03-05 | Stop reason: HOSPADM

## 2025-03-05 RX ORDER — PROPOFOL 10 MG/ML
INJECTION, EMULSION INTRAVENOUS CONTINUOUS PRN
Status: DISCONTINUED | OUTPATIENT
Start: 2025-03-05 | End: 2025-03-05

## 2025-03-05 RX ORDER — PROCHLORPERAZINE EDISYLATE 5 MG/ML
5 INJECTION INTRAMUSCULAR; INTRAVENOUS EVERY 30 MIN PRN
Status: DISCONTINUED | OUTPATIENT
Start: 2025-03-05 | End: 2025-03-05 | Stop reason: HOSPADM

## 2025-03-05 RX ORDER — LIDOCAINE HYDROCHLORIDE 20 MG/ML
INJECTION INTRAVENOUS
Status: DISCONTINUED | OUTPATIENT
Start: 2025-03-05 | End: 2025-03-05

## 2025-03-05 RX ADMIN — LIDOCAINE HYDROCHLORIDE 40 MG: 20 INJECTION INTRAVENOUS at 10:03

## 2025-03-05 RX ADMIN — PROPOFOL 150 MCG/KG/MIN: 10 INJECTION, EMULSION INTRAVENOUS at 10:03

## 2025-03-05 RX ADMIN — DEXMEDETOMIDINE 12 MCG: 100 INJECTION, SOLUTION, CONCENTRATE INTRAVENOUS at 10:03

## 2025-03-05 RX ADMIN — DEXMEDETOMIDINE 8 MCG: 100 INJECTION, SOLUTION, CONCENTRATE INTRAVENOUS at 11:03

## 2025-03-05 RX ADMIN — PROPOFOL 50 MG: 10 INJECTION, EMULSION INTRAVENOUS at 10:03

## 2025-03-05 RX ADMIN — PROPOFOL 10 MG: 10 INJECTION, EMULSION INTRAVENOUS at 10:03

## 2025-03-05 NOTE — ANESTHESIA PREPROCEDURE EVALUATION
03/05/2025  Mitchel Joya is a 64 y.o., male.      Pre-op Assessment    I have reviewed the NPO Status.      Review of Systems  Anesthesia Hx:  No problems with previous Anesthesia   History of prior surgery of interest to airway management or planning:  Previous anesthesia: General        Denies Family Hx of Anesthesia complications.    Denies Personal Hx of Anesthesia complications.                    Pulmonary:        Sleep Apnea                  Physical Exam  General: Well nourished, Cooperative, Alert and Oriented    Airway:  Mallampati: III   Mouth Opening: Normal  TM Distance: Normal  Tongue: Normal  Neck ROM: Normal ROM    Anesthesia Plan  Type of Anesthesia, risks & benefits discussed:    Anesthesia Type: Gen Natural Airway, Gen ETT  Intra-op Monitoring Plan: Standard ASA Monitors  Post Op Pain Control Plan: multimodal analgesia  Induction:  IV  Informed Consent: Informed consent signed with the Patient and all parties understand the risks and agree with anesthesia plan.  All questions answered.   ASA Score: 3    Ready For Surgery From Anesthesia Perspective.   .

## 2025-03-05 NOTE — ANESTHESIA POSTPROCEDURE EVALUATION
Anesthesia Post Evaluation    Patient: Mitchel Joya    Procedure(s) Performed: Procedure(s) (LRB):  Biopsy-bone marrow (Right)    Final Anesthesia Type: general      Patient location during evaluation: [facility]  Patient participation: Yes- Able to Participate  Level of consciousness: awake and alert  Post-procedure vital signs: reviewed and stable  Pain management: adequate  Airway patency: patent    PONV status at discharge: No PONV  Anesthetic complications: no      Cardiovascular status: blood pressure returned to baseline  Respiratory status: unassisted  Hydration status: euvolemic  Follow-up not needed.          Vitals Value Taken Time   /62 03/05/25 11:26   Temp 36.6 °C (97.8 °F) 03/05/25 11:15   Pulse 65 03/05/25 11:27   Resp 14 03/05/25 11:27   SpO2 96 % 03/05/25 11:27   Vitals shown include unfiled device data.      No case tracking events are documented in the log.      Pain/Tommy Score: Tommy Score: 7 (3/5/2025 11:15 AM)

## 2025-03-05 NOTE — PROGRESS NOTES
Patient discharged to home via wheelchair, escorted by his wife. Pt alert and talkative, vitals stable on room air, tolerating PO intake. Discharge instructions (written and verbal) and follow-up information given to patient who verbalized understanding, as well as a readiness for discharge. [facility] contact info provided for additional questions following discharge. Aox4. Dressing clean and dry.

## 2025-03-05 NOTE — DISCHARGE SUMMARY
[facility]  Hematology  Bone Marrow Transplant  Discharge Summary      Patient Name: Mitchel Joya  MRN: [id number]  Admission Date: 3/5/2025  Hospital Length of Stay: 0 days  Discharge Date and Time: 3/5/2025 12:19 PM  Attending Physician: No att. providers found   Discharging Provider: Dulce Maria Saul NP  Primary Care Provider: Juan Pablo Pereira MD    HPI: 64 year old male with history of Multiple Myeloma presents for pre autologous stem cell transplant restaging bone marrow aspiration and biopsy with sedation.     Procedure(s) (LRB):  Biopsy-bone marrow (Right)     Hospital Course: Patient admitted to endoscopy for a bone marrow aspiration and biopsy. Consent was obtained for a bone marrow biopsy. Patient was sedated per anesthesia and a bone marrow biopsy and aspiration was performed on 2nd floor endo suite 5. Patient was then transferred to post op and discharged home when appropriate per anesthesia.      Goals of Care Treatment Preferences:             Pending Diagnostic Studies:       Procedure Component Value Units Date/Time    Heme Disorders DNA/RNA Hold, Bone Marrow [[id number]] Collected: 03/05/25 1016    Order Status: Sent Lab Status: In process Updated: 03/05/25 1312    Specimen: Bone Marrow     Multiple Myeloma MRD by Flow, BM [[id number]] Collected: 03/05/25 1016    Order Status: Sent Lab Status: In process Updated: 03/05/25 1313    Specimen: Bone Marrow     Specimen to Pathology, Bone Marrow Aspiration/Biopsy [[id number]] Collected: 03/05/25 1016    Order Status: Sent Lab Status: In process Updated: 03/05/25 1209    Specimen: Bone Marrow           There are no hospital problems to display for this patient.     Discharged Condition: stable    Disposition: Home or Self Care    Follow Up:  Future Appointments   Date Time Provider Department Center   3/7/2025  8:00 AM [facility] PET CT LIMIT 500 LBS [facility] PET CT [facility]   3/10/2025  8:45 AM SPECIMEN, HEMONC CANCER BLDG [facility] MARY Kong    3/10/2025  8:50 AM NOM LAB BMT [facility] LABBMT [facility]   3/10/2025  9:30 AM BMT, TRANSPLANT COORDINATOR [facility] BMT [facility]   3/10/2025  9:30 AM SPECIMEN LAB, [facility] BONE MARROW [facility] SLBMT [facility]   3/10/2025 10:00 AM BMT, BLOOD BANK PHYSICIAN [facility] BMT [facility]   3/10/2025 10:30 AM BMT, PHERESIS NURSE [facility] BMT [facility]   3/10/2025 11:00 AM BMT, PHARMACIST MYELOMA [facility] BMT [facility]   3/10/2025 11:30 AM BMT, BLOOD PLATELET  [facility] BMT [facility]   3/10/2025  1:00 PM BMT FIN COORD, [facility] TRANSPLANT [facility] BMT [facility]   3/10/2025  1:30 PM NanetteStalin LCSW [facility] BMT [facility]   3/10/2025  2:15 PM [facility] [id number] 485 LB LIMIT [facility] XRAY OP [facility]   3/10/2025  2:30 PM ECHO, [facility] ECHOSTR [facility]   3/10/2025  2:45 PM EKG, APPT [facility] EKG [facility]   3/10/2025  3:00 PM PULMONARY FUNCTION [facility] PULMLAB [facility]   3/10/2025  3:15 PM PULMONARY FUNCTION [facility] PULMLAB [facility]   3/11/2025 10:00 AM Yoni Freeman RD [facility] INONCTF [facility]   3/18/2025 10:00 AM Yoni Ford, PhD [facility] CAN PSY [facility]   3/18/2025 10:00 AM BMT CANPSYCH ASSESSMENT, [facility] CAN PSYCH ROWELL [facility] CAN PSY [facility]   3/24/2025  1:00 PM Kevin Hemphill MD [facility] BMT [facility]   4/4/2025  2:45 PM INJECTION, [facility] INFUSION [facility] CHEMO [facility]   4/4/2025  3:00 PM BMT, NURSE DRAW [facility] BMT [facility]   4/4/2025  3:45 PM La Nena Pastrana NP [facility] BMT [facility]        Patient Instructions:      Notify your health care provider if you experience any of the following:  temperature >100.4     Notify your health care provider if you experience any of the following:  persistent nausea and vomiting or diarrhea     Notify your health care provider if you experience any of the following:  severe uncontrolled pain     Notify your health care provider if you experience any of the following:  redness, tenderness, or signs of infection (pain, swelling, redness, odor  or green/yellow discharge around incision site)     Notify your health care provider if you experience any of the following:  difficulty breathing or increased cough     Notify your health care provider if you experience any of the following:  persistent dizziness, light-headedness, or visual disturbances     Notify your health care provider if you experience any of the following:  increased confusion or weakness     Remove dressing in 24 hours     Activity as tolerated     Shower on day dressing removed (No bath)     Medications:  Reconciled Home Medications:      Medication List        ASK your doctor about these medications      aspirin 81 MG EC tablet  Commonly known as: ECOTRIN  Take 81 mg by mouth once daily.     dexAMETHasone 4 MG Tab  Commonly known as: DECADRON  SMARTSI Tablet(s) By Mouth Once a Week     HYDROcodone-acetaminophen  mg per tablet  Commonly known as: NORCO     levothyroxine 88 MCG tablet  Commonly known as: SYNTHROID  1 tablet.     meloxicam 15 MG tablet  Commonly known as: MOBIC  Take 1 tablet by mouth once daily.     multivitamin per tablet  Commonly known as: THERAGRAN  multivitamin tablet, [RxNorm: 0]     ondansetron 8 MG Tbdl  Commonly known as: ZOFRAN-ODT  Take 8 mg by mouth.     pantoprazole 40 MG tablet  Commonly known as: PROTONIX  Take 1 tablet (40 mg total) by mouth once daily.     promethazine 25 MG tablet  Commonly known as: PHENERGAN  Take 25 mg by mouth 2 (two) times daily as needed.     REVLIMID 20 mg Cap  Generic drug: lenalidomide  Take by mouth.     rosuvastatin 20 MG tablet  Commonly known as: CRESTOR  Take 20 mg by mouth every evening.     tiZANidine 4 MG tablet  Commonly known as: ZANAFLEX  Take 4 mg by mouth nightly as needed.     triamcinolone acetonide 0.025% 0.025 % cream  Commonly known as: KENALOG  Apply topically 2 (two) times daily. for 10 days     valACYclovir 500 MG tablet  Commonly known as: VALTREX  Take 500 mg by mouth.     VITAMIN D-3 ORAL  VITAMIN  D3, [RxNorm: 0]     XARELTO DVT-PE TREAT 30D START 15 mg (42)- 20 mg (9) tablet dose pack  Generic drug: rivaroxaban  Take 15 mg by mouth 2 (two) times a day.              Dulce Maria Saul NP  Bone Marrow Transplant  [facility]

## 2025-03-05 NOTE — TRANSFER OF CARE
"Anesthesia Transfer of Care Note    Patient: Mitchel Joya    Procedure(s) Performed: Procedure(s) (LRB):  Biopsy-bone marrow (Right)    Patient location: [facility]    Anesthesia Type: general    Transport from OR: Transported from OR on 2-3 L/min O2 by NC with adequate spontaneous ventilation    Post pain: adequate analgesia    Post assessment: no apparent anesthetic complications and tolerated procedure well    Post vital signs: stable    Level of consciousness: responds to stimulation    Nausea/Vomiting: no nausea/vomiting    Complications: none    Transfer of care protocol was followedComments: Nurse at bedside, VSS, spont reg resp noted      Last vitals: Visit Vitals  /63   Pulse 70   Temp 36.7 °C (98.1 °F) (Temporal)   Resp 17   Ht 5' 9" (1.753 m)   Wt 99.8 kg (220 lb)   SpO2 (!) 94%   BMI 32.49 kg/m²     "

## 2025-03-05 NOTE — DISCHARGE INSTRUCTIONS
Discharge instructions for having a Bone Marrow Aspiration / Biopsy    Keep Bandage in place for 24 hours.  - Do not shower or take a tube bath during this time. (you may sponge bathe).  - Call the nurse or physician for excessive bleeding or pain at biopsy site.  - You may take Tylenol as needed for pain.    You have received medication to sedate you.  - Do not drive a car or operate heavy machinery for the rest of the day.  - You may resume other activities as tolerated.    You can call [phone] for any problems during the hours of 8:00 AM-5:00PM.    For an emergency after 5:00 PM you can call [phone] and have the  page the Hematologist / Oncologist on call.

## 2025-03-05 NOTE — PROCEDURES
"Mitchel Joya is a 64 y.o. male patient.    Temp: 98.1 °F (36.7 °C) (03/05/25 1028)  Pulse: 70 (03/05/25 1115)  Resp: 17 (03/05/25 1115)  BP: 102/63 (03/05/25 1115)  SpO2: (!) 94 % (03/05/25 1115)  Weight: 99.8 kg (220 lb) (03/05/25 1028)  Height: 5' 9" (175.3 cm) (03/05/25 1028)       Bone marrow    Date/Time: 3/5/2025 11:21 AM    Performed by: Dulce Maria Saul NP  Authorized by: Dulce Maria Saul NP    Aspiration?: Yes   Biopsy?: Yes      PROCEDURE NOTE:  Bone Marrow aspiration and biopsy  Indication: pre autologous transplant restaging multiple myeloma  Consent: Informed consent was obtained from patient.  Timeout: Done and documented.  Position: right lateral  Site: Left posterior illiac crest.  Prep: Betadine.  Needle used: 11 gauge Jamshidi needle.  Anesthetic: 2% lidocaine 5 cc.  Biopsy: The biopsy needle was introduced into the marrow cavity and 20 cc's of aspirate was obtained without complications and sent for flow, PCPD FISH and MRD. Core biopsy obtained and sent for routine histologic examination.  Complications: None.  EBL: minimal  Disposition: The patient was discharged home per anesthesia protocol.    Dulce Maria Saul NP  Hematology/BMT    3/5/2025    "

## 2025-03-05 NOTE — H&P
[facility]  Hematology/Oncology  H&P    Patient Name: Mitchel Joya  MRN: [id number]  Admission Date: 3/5/2025  Code Status: No Order   Attending Provider: Kevin Hemphill MD  Primary Care Physician: Juan Pablo Pereira MD  Principal Problem:<principal problem not specified>    Subjective:     HPI: 64 year old male with history of multiple myeloma presents for a pre transplant restaging bone marrow aspiration and biopsy with sedation.     Oncology Treatment Plan:   [No matching plan found]    Medications:  Continuous Infusions:  Scheduled Meds:  PRN Meds:     Review of patient's allergies indicates:  No Known Allergies     Past Medical History:   Diagnosis Date    Hyperlipidemia      Past Surgical History:   Procedure Laterality Date    CHOLECYSTECTOMY      heart stint      KNEE SURGERY      left    SHOULDER SURGERY      SPINE SURGERY       Family History       Problem Relation (Age of Onset)    Heart disease Father    No Known Problems Mother          Tobacco Use    Smoking status: Never    Smokeless tobacco: Never   Substance and Sexual Activity    Alcohol use: Yes     Comment: occ    Drug use: Not on file    Sexual activity: Not on file       Review of Systems   Constitutional:  Negative for activity change, appetite change, chills, diaphoresis, fatigue, fever and unexpected weight change.   HENT:  Negative for congestion, dental problem, mouth sores, nosebleeds, postnasal drip, rhinorrhea, sinus pressure, sore throat and trouble swallowing.    Eyes:  Negative for photophobia and visual disturbance.   Respiratory:  Negative for cough and shortness of breath.    Cardiovascular:  Negative for chest pain, palpitations and leg swelling.   Gastrointestinal:  Negative for abdominal distention, abdominal pain, blood in stool, constipation, diarrhea, nausea and vomiting.   Genitourinary:  Negative for dysuria, frequency, hematuria and urgency.   Musculoskeletal:  Negative for arthralgias, back pain and myalgias.  "  Skin:  Negative for pallor and rash.   Allergic/Immunologic: Negative for immunocompromised state.   Neurological:  Negative for dizziness, syncope, weakness, numbness and headaches.   Hematological:  Negative for adenopathy. Does not bruise/bleed easily.   Psychiatric/Behavioral:  Negative for confusion. The patient is not nervous/anxious.      Objective:     Vital Signs (Most Recent):    Vital Signs (24h Range):           There is no height or weight on file to calculate BMI.  There is no height or weight on file to calculate BSA.    No intake or output data in the 24 hours ending 03/05/25 1013    Physical Exam  Vitals reviewed.   Constitutional:       General: He is not in acute distress.     Appearance: He is well-developed.   HENT:      Head: Normocephalic and atraumatic.   Eyes:      General: No scleral icterus.     Conjunctiva/sclera: Conjunctivae normal.      Pupils: Pupils are equal, round, and reactive to light.   Neck:      Thyroid: No thyromegaly.   Cardiovascular:      Rate and Rhythm: Normal rate and regular rhythm.   Pulmonary:      Effort: Pulmonary effort is normal. No respiratory distress.   Abdominal:      General: There is no distension.   Musculoskeletal:         General: Normal range of motion.      Cervical back: Normal range of motion and neck supple. Normal range of motion.   Skin:     General: Skin is warm and dry.      Coloration: Skin is not pale.      Findings: No petechiae or rash.   Neurological:      General: No focal deficit present.      Mental Status: He is alert and oriented to person, place, and time. Mental status is at baseline.   Psychiatric:         Mood and Affect: Mood normal.         Thought Content: Thought content normal.         Significant Labs:   CBC: No results for input(s): "WBC", "HGB", "HCT", "PLT" in the last 48 hours. and CMP: No results for input(s): "NA", "K", "CL", "CO2", "GLU", "BUN", "CREATININE", "CALCIUM", "PROT", "ALBUMIN", "BILITOT", "ALKPHOS", "AST", " ""ALT", "ANIONGAP", "EGFRNONAA" in the last 48 hours.    Invalid input(s): "ESTGFAFRICA"    Diagnostic Results:  None    Assessment/Plan:     There are no hospital problems to display for this patient.    No contraindications to proceeding with bone marrow aspiration and biopsy with sedation today. Consent obtained. Risk vs benefit explained. All questions answered to his satisfaction.    Dulce Maria Saul NP  Hematology/Oncology  [facility]      "

## 2025-03-07 ENCOUNTER — HOSPITAL ENCOUNTER (OUTPATIENT)
Dept: RADIOLOGY | Facility: HOSPITAL | Age: 65
Discharge: HOME | End: 2025-03-07
Attending: INTERNAL MEDICINE
Payer: COMMERCIAL

## 2025-03-07 DIAGNOSIS — C90.00 MULTIPLE MYELOMA NOT HAVING ACHIEVED REMISSION: ICD-10-CM

## 2025-03-07 DIAGNOSIS — Z76.82 STEM CELL TRANSPLANT CANDIDATE: ICD-10-CM

## 2025-03-07 LAB
BODY SITE - BONE MARROW: NORMAL
CLINICAL DIAGNOSIS - BONE MARROW: NORMAL
FLOW CYTOMETRY ANTIBODIES ANALYZED - BONE MARROW: NORMAL
FLOW CYTOMETRY COMMENT - BONE MARROW: NORMAL
FLOW CYTOMETRY INTERPRETATION - BONE MARROW: NORMAL
GENETICIST REVIEW: NORMAL
PCPDS FINAL DIAGNOSIS: NORMAL
PCPDS PRE-ANALYSIS PRE-SORT: NORMAL
PLASMA CELL PROLIF RELEASED BY: NORMAL
PLASMA CELL PROLIF RESULT SUMMARY: NORMAL
PLASMA CELL PROLIF RESULT TABLE: NORMAL
POCT GLUCOSE: 119 MG/DL (ref 70–110)
REASON FOR REFERRAL, PLASMA CELL PROLIF (PCPD), FISH: NORMAL
REF LAB TEST METHOD: NORMAL
RESULTS, PLASMA CELL PROLIF (PCPD), FISH: NORMAL
SERVICE CMNT-IMP: NORMAL
SERVICE CMNT-IMP: NORMAL
SPECIMEN SOURCE: NORMAL
SPECIMEN, PLASMA CELL PROLIF (PCPD), FISH: NORMAL

## 2025-03-07 PROCEDURE — 78816 PET IMAGE W/CT FULL BODY: CPT | Mod: 26,PI,, | Performed by: STUDENT IN AN ORGANIZED HEALTH CARE EDUCATION/TRAINING PROGRAM

## 2025-03-07 PROCEDURE — 78816 PET IMAGE W/CT FULL BODY: CPT | Mod: TC

## 2025-03-07 PROCEDURE — A9552 F18 FDG: HCPCS | Performed by: INTERNAL MEDICINE

## 2025-03-07 RX ORDER — FLUDEOXYGLUCOSE F18 500 MCI/ML
11.84 INJECTION INTRAVENOUS
Status: COMPLETED | OUTPATIENT
Start: 2025-03-07 | End: 2025-03-07

## 2025-03-07 RX ADMIN — FLUDEOXYGLUCOSE F-18 11.84 MILLICURIE: 500 INJECTION INTRAVENOUS at 07:03

## 2025-03-08 LAB
% B-CELL PRECURSORS: 0.28 %
% MAST CELLS: 0.01 %
ABNORMAL PLASMA CELL EVENTS: 252
LOWER LIMIT OF QUANTITATION (LLOQ): 1 X10(-5)
MINIMAL RESIDUAL DISEASE DETECTION (MRD), BONE MARROW: 0 %
PATIENT / SAMPLE THEORETICAL LOQ: 2 X10(-6)
PERCENT NORMAL PLASMA CELLS (OF TOTAL PC): 88.6 %
PLASMA CELLS ASSESS MAR: NORMAL
POLY PLASMA CELL EVENTS: 1964
TOTAL CELLS COUNTED MAR: NORMAL
TOTAL PLASMA CELL EVENTS: 2216
VALIDATED ASSAY SENSITIVITY: 2.13 X10(-6)

## 2025-03-10 ENCOUNTER — HOSPITAL ENCOUNTER (OUTPATIENT)
Dept: PULMONOLOGY | Facility: CLINIC | Age: 65
Discharge: HOME | End: 2025-03-10
Payer: COMMERCIAL

## 2025-03-10 ENCOUNTER — HOSPITAL ENCOUNTER (OUTPATIENT)
Dept: RADIOLOGY | Facility: HOSPITAL | Age: 65
Discharge: HOME | End: 2025-03-10
Attending: INTERNAL MEDICINE
Payer: COMMERCIAL

## 2025-03-10 ENCOUNTER — CLINICAL SUPPORT (OUTPATIENT)
Dept: HEMATOLOGY/ONCOLOGY | Facility: CLINIC | Age: 65
End: 2025-03-10
Payer: COMMERCIAL

## 2025-03-10 ENCOUNTER — HOSPITAL ENCOUNTER (OUTPATIENT)
Dept: CARDIOLOGY | Facility: HOSPITAL | Age: 65
Discharge: HOME | End: 2025-03-10
Attending: INTERNAL MEDICINE
Payer: COMMERCIAL

## 2025-03-10 ENCOUNTER — HOSPITAL ENCOUNTER (OUTPATIENT)
Dept: CARDIOLOGY | Facility: CLINIC | Age: 65
Discharge: HOME | End: 2025-03-10
Payer: COMMERCIAL

## 2025-03-10 ENCOUNTER — LAB VISIT (OUTPATIENT)
Dept: LAB | Facility: HOSPITAL | Age: 65
End: 2025-03-10
Attending: INTERNAL MEDICINE
Payer: COMMERCIAL

## 2025-03-10 VITALS
HEART RATE: 68 BPM | DIASTOLIC BLOOD PRESSURE: 70 MMHG | HEIGHT: 69 IN | BODY MASS INDEX: 32.33 KG/M2 | WEIGHT: 218.25 LBS | SYSTOLIC BLOOD PRESSURE: 122 MMHG

## 2025-03-10 DIAGNOSIS — C90.00 MULTIPLE MYELOMA NOT HAVING ACHIEVED REMISSION: ICD-10-CM

## 2025-03-10 DIAGNOSIS — Z76.82 STEM CELL TRANSPLANT CANDIDATE: ICD-10-CM

## 2025-03-10 DIAGNOSIS — C90.00 MULTIPLE MYELOMA NOT HAVING ACHIEVED REMISSION: Primary | ICD-10-CM

## 2025-03-10 LAB
AMPHET+METHAMPHET UR QL: NEGATIVE
ASCENDING AORTA: 2.88 CM
AV AREA BY CONTINUOUS VTI: 2.9 CM2
AV INDEX (PROSTH): 0.72
AV LVOT MEAN GRADIENT: 3 MMHG
AV LVOT PEAK GRADIENT: 6 MMHG
AV MEAN GRADIENT: 6 MMHG
AV PEAK GRADIENT: 10 MMHG
AV VALVE AREA BY VELOCITY RATIO: 3.1 CM²
AV VALVE AREA: 3 CM2
AV VELOCITY RATIO: 0.75
BARBITURATES UR QL SCN>200 NG/ML: NEGATIVE
BENZODIAZ UR QL SCN>200 NG/ML: NEGATIVE
BSA FOR ECHO PROCEDURE: 2.19 M2
BZE UR QL SCN: NEGATIVE
CANNABINOIDS UR QL SCN: ABNORMAL
CREAT UR-MCNC: 205 MG/DL (ref 23–375)
CV ECHO LV RWT: 0.48 CM
DLCO ADJ PRE: 15.32 ML/(MIN*MMHG) (ref 19.56–33.41)
DLCO SINGLE BREATH LLN: 19.56
DLCO SINGLE BREATH PRE REF: 55.6 %
DLCO SINGLE BREATH REF: 26.48
DLCOC SBVA LLN: 2.71
DLCOC SBVA PRE REF: 84.1 %
DLCOC SBVA REF: 3.94
DLCOC SINGLE BREATH LLN: 19.56
DLCOC SINGLE BREATH PRE REF: 57.9 %
DLCOC SINGLE BREATH REF: 26.48
DLCOCSBVAULN: 5.18
DLCOCSINGLEBREATHULN: 33.41
DLCOCSINGLEBREATHZSCORE: -2.65
DLCOSINGLEBREATHULN: 33.41
DLCOSINGLEBREATHZSCORE: -2.79
DLCOVA LLN: 2.71
DLCOVA PRE REF: 80.9 %
DLCOVA PRE: 3.19 ML/(MIN*MMHG*L) (ref 2.71–5.18)
DLCOVA REF: 3.94
DLCOVAULN: 5.18
DLVAADJ PRE: 3.31 ML/(MIN*MMHG*L) (ref 2.71–5.18)
DOP CALC AO PEAK VEL: 1.6 M/S
DOP CALC AO VTI: 33 CM
DOP CALC LVOT AREA: 4.2 CM2
DOP CALC LVOT DIAMETER: 2.3 CM
DOP CALC LVOT PEAK VEL: 1.2 M/S
DOP CALC LVOT STROKE VOLUME: 98.4 CM3
DOP CALCLVOT PEAK VEL VTI: 23.7 CM
E WAVE DECELERATION TIME: 295 MS
E/A RATIO: 0.71
E/E' RATIO: 8 M/S
ECHO EF ESTIMATED: 54 %
ECHO LV POSTERIOR WALL: 1 CM (ref 0.6–1.1)
ETHANOL UR-MCNC: <10 MG/DL
FEF 25 75 LLN: 1.54
FEF 25 75 PRE REF: 171.7 %
FEF 25 75 REF: 3.26
FEV05 LLN: 1.4
FEV05 REF: 2.54
FEV1 FVC LLN: 64
FEV1 FVC PRE REF: 114.2 %
FEV1 FVC REF: 77
FEV1 LLN: 2.32
FEV1 PRE REF: 98.3 %
FEV1 REF: 3.16
FEV1FVCZSCORE: 1.58
FEV1ZSCORE: -0.11
FRACTIONAL SHORTENING: 26.2 % (ref 28–44)
FVC LLN: 3.09
FVC PRE REF: 86 %
FVC REF: 4.11
FVCZSCORE: -0.92
GLOBAL LONGITUIDAL STRAIN: 17 %
INTERVENTRICULAR SEPTUM: 0.8 CM (ref 0.6–1.1)
IVC DIAMETER: 1.15 CM
IVC PRE: 3.49 L (ref 3.09–5.15)
IVC SINGLE BREATH LLN: 3.09
IVC SINGLE BREATH PRE REF: 85 %
IVC SINGLE BREATH REF: 4.11
IVCSINGLEBREATHULN: 5.15
IVRT: 140 MS
LA MAJOR: 4.7 CM
LA MINOR: 5.4 CM
LA WIDTH: 3.6 CM
LEFT ATRIUM SIZE: 3.5 CM
LEFT ATRIUM VOLUME INDEX MOD: 20 ML/M2
LEFT ATRIUM VOLUME INDEX: 25 ML/M2
LEFT ATRIUM VOLUME MOD: 42 ML
LEFT ATRIUM VOLUME: 54 CM3
LEFT INTERNAL DIMENSION IN SYSTOLE: 3.1 CM (ref 2.1–4)
LEFT VENTRICLE DIASTOLIC VOLUME INDEX: 37.38 ML/M2
LEFT VENTRICLE DIASTOLIC VOLUME: 80 ML
LEFT VENTRICLE MASS INDEX: 55.5 G/M2
LEFT VENTRICLE SYSTOLIC VOLUME INDEX: 17.3 ML/M2
LEFT VENTRICLE SYSTOLIC VOLUME: 37 ML
LEFT VENTRICULAR INTERNAL DIMENSION IN DIASTOLE: 4.2 CM (ref 3.5–6)
LEFT VENTRICULAR MASS: 118.7 G
LV LATERAL E/E' RATIO: 5.8
LV SEPTAL E/E' RATIO: 15
METHADONE UR QL SCN>300 NG/ML: NEGATIVE
MICROSCOPIC COMMENT: NORMAL
MV A" WAVE DURATION": 152.24 MS
MV PEAK A VEL: 1.05 M/S
MV PEAK E VEL: 0.75 M/S
OHS CV RV/LV RATIO: 0.79 CM
OHS LV EJECTION FRACTION SIMPSONS BIPLANE MOD: 58 %
OHS QRS DURATION: 104 MS
OHS QTC CALCULATION: 468 MS
OPIATES UR QL SCN: ABNORMAL
PCP UR QL SCN>25 NG/ML: NEGATIVE
PEF LLN: 6.19
PEF PRE REF: 120 %
PEF REF: 8.38
PHYSICIAN COMMENT: ABNORMAL
PISA TR MAX VEL: 1.9 M/S
PRE DLCO: 14.73 ML/(MIN*MMHG) (ref 19.56–33.41)
PRE FEF 25 75: 5.59 L/S (ref 1.54–4.97)
PRE FET 100: 6.02 SEC
PRE FEV05 REF: 110 %
PRE FEV1 FVC: 87.84 % (ref 64.07–88.24)
PRE FEV1: 3.1 L (ref 2.32–3.94)
PRE FEV5: 2.79 L (ref 1.4–3.67)
PRE FVC: 3.53 L (ref 3.09–5.15)
PRE PEF: 10.05 L/S (ref 6.19–10.56)
PULM VEIN A" WAVE DURATION": 152.24 MS
PULM VEIN S/D RATIO: 2.62
PULMONIC VEIN PEAK A VELOCITY: 0.2 M/S
PV PEAK D VEL: 0.26 M/S
PV PEAK S VEL: 0.68 M/S
RA MAJOR: 4.35 CM
RA PRESSURE ESTIMATED: 3 MMHG
RA WIDTH: 3.44 CM
RBC #/AREA URNS AUTO: 1 /HPF (ref 0–4)
RIGHT ATRIAL AREA: 13.2 CM2
RIGHT VENTRICLE DIASTOLIC BASEL DIMENSION: 3.3 CM
RV TB RVSP: 5 MMHG
RV TISSUE DOPPLER FREE WALL SYSTOLIC VELOCITY 1 (APICAL 4 CHAMBER VIEW): 14.03 CM/S
SINUS: 2.61 CM
STJ: 2.6 CM
TDI LATERAL: 0.13 M/S
TDI SEPTAL: 0.05 M/S
TDI: 0.09 M/S
TOXICOLOGY INFORMATION: ABNORMAL
TRICUSPID ANNULAR PLANE SYSTOLIC EXCURSION: 2.16 CM
TV PEAK GRADIENT: 15 MMHG
TV REST PULMONARY ARTERY PRESSURE: 17 MMHG
VA PRE: 4.62 L (ref 6.57–6.57)
VA SINGLE BREATH LLN: 6.57
VA SINGLE BREATH PRE REF: 70.4 %
VA SINGLE BREATH REF: 6.57
VASINGLEBREATHULN: 6.57
WBC #/AREA URNS AUTO: 3 /HPF (ref 0–5)
Z-SCORE OF LEFT VENTRICULAR DIMENSION IN END DIASTOLE: -4.94
Z-SCORE OF LEFT VENTRICULAR DIMENSION IN END SYSTOLE: -2.39

## 2025-03-10 PROCEDURE — 93306 TTE W/DOPPLER COMPLETE: CPT

## 2025-03-10 PROCEDURE — 93005 ELECTROCARDIOGRAM TRACING: CPT | Mod: S$GLB,,, | Performed by: INTERNAL MEDICINE

## 2025-03-10 PROCEDURE — 99999 PR PBB SHADOW E&M-EST. PATIENT-LVL I: CPT | Mod: PBBFAC,,,

## 2025-03-10 PROCEDURE — 93356 MYOCRD STRAIN IMG SPCKL TRCK: CPT | Mod: ,,, | Performed by: INTERNAL MEDICINE

## 2025-03-10 PROCEDURE — 93306 TTE W/DOPPLER COMPLETE: CPT | Mod: 26,,, | Performed by: INTERNAL MEDICINE

## 2025-03-10 PROCEDURE — 80307 DRUG TEST PRSMV CHEM ANLYZR: CPT | Mod: 91 | Performed by: INTERNAL MEDICINE

## 2025-03-10 PROCEDURE — 93010 ELECTROCARDIOGRAM REPORT: CPT | Mod: S$GLB,,, | Performed by: INTERNAL MEDICINE

## 2025-03-10 PROCEDURE — 81001 URINALYSIS AUTO W/SCOPE: CPT | Mod: XB | Performed by: INTERNAL MEDICINE

## 2025-03-10 PROCEDURE — 71046 X-RAY EXAM CHEST 2 VIEWS: CPT | Mod: TC,FY

## 2025-03-10 PROCEDURE — 71046 X-RAY EXAM CHEST 2 VIEWS: CPT | Mod: 26,,, | Performed by: RADIOLOGY

## 2025-03-10 RX ORDER — VITAMIN B COMPLEX
1 CAPSULE ORAL DAILY
COMMUNITY

## 2025-03-10 RX ORDER — CHOLECALCIFEROL (VITAMIN D3) 25 MCG
1000 TABLET ORAL DAILY
COMMUNITY

## 2025-03-10 RX ORDER — SILDENAFIL 100 MG/1
45 TABLET, FILM COATED ORAL DAILY PRN
COMMUNITY

## 2025-03-10 NOTE — PROGRESS NOTES
BMT Pharmacist Evaluation      Current Outpatient Medications:     aspirin (ECOTRIN) 81 MG EC tablet, Take 81 mg by mouth once daily., Disp: , Rfl:     calcium carbonate/vitamin D3 (VITAMIN D-3 ORAL), VITAMIN D3, [RxNorm: 0], Disp: , Rfl:     dexAMETHasone (DECADRON) 4 MG Tab, SMARTSI Tablet(s) By Mouth Once a Week, Disp: , Rfl:     HYDROcodone-acetaminophen (NORCO)  mg per tablet, , Disp: , Rfl:     levothyroxine (SYNTHROID) 88 MCG tablet, 1 tablet., Disp: , Rfl:     meloxicam (MOBIC) 15 MG tablet, Take 1 tablet by mouth once daily., Disp: , Rfl:     multivitamin (THERAGRAN) per tablet, multivitamin tablet, [RxNorm: 0], Disp: , Rfl:     ondansetron (ZOFRAN-ODT) 8 MG TbDL, Take 8 mg by mouth., Disp: , Rfl:     pantoprazole (PROTONIX) 40 MG tablet, Take 1 tablet (40 mg total) by mouth once daily., Disp: 30 tablet, Rfl: 11    promethazine (PHENERGAN) 25 MG tablet, Take 25 mg by mouth 2 (two) times daily as needed., Disp: , Rfl:     REVLIMID 20 mg Cap, Take by mouth., Disp: , Rfl:     rosuvastatin (CRESTOR) 20 MG tablet, Take 20 mg by mouth every evening., Disp: , Rfl: 11    tiZANidine (ZANAFLEX) 4 MG tablet, Take 4 mg by mouth nightly as needed., Disp: , Rfl:     triamcinolone acetonide 0.025% (KENALOG) 0.025 % cream, Apply topically 2 (two) times daily. for 10 days (Patient not taking: Reported on 2025), Disp: 15 g, Rfl: 0    valACYclovir (VALTREX) 500 MG tablet, Take 500 mg by mouth., Disp: , Rfl:     XARELTO DVT-PE TREAT 30D START 15 mg (42)- 20 mg (9) tablet dose pack, Take 15 mg by mouth 2 (two) times a day., Disp: , Rfl:         Review of patient's allergies indicates:  No Known Allergies      Estimated Glomerular Filtration Rate: 98.2 mL/min/1.73m2 (by CKD-EPI based on SCr of 0.8 mg/dL).  Estimated Glomerular Filtration Rate: 98.2 mL/min/1.73m2 (by CKD-EPI based on SCr of 0.8 mg/dL).        Medication adherence: He expresses that he has been managing his medications and uses a pill case that  helps him remember taking them.   Medication-related problems: none       Planned conditioning regimen:  Melphalan 200 on Day -1    Antimicrobial Prophylaxis:  Acyclovir starting on Day -1  Levofloxacin starting on Day -1  Fluconazole starting on Day -1  Bactrim starting on Day +30    Growth Factor Support:  Neupogen starting on Day +7      Caregiver: Cherie Joya, other people while at the [facility] ( discharge)  Post-transplant discharge plans: [facility]     Notes:  Reviewed and reconciled the medication list with the patient and family member Patient was able to confirm all medications he currently takes including schedule and indication. Patient demonstrates excellent medication adherence and understands the importance of this through the transplant process.     Reviewed the planned high-dose chemotherapy regimen, including schedule and possible side effects. Provided the patient with drug information handouts for chemotherapy. Reviewed possible side effects of transplant including: neutropenia, thrombocytopenia, anemia, infection, infusion reactions, nausea/vomiting, mucositis, loss of appetite, taste changes, diarrhea,hair loss, liver and/or renal dysfunction. Also discussed the rare side effect of neurotoxicity. Reviewed prophylactic antimicrobials, as well as prophylactic and as needed antiemetics. Encouraged the patient to report all possible side effects/new symptoms and to ask for supportive care medications if needed. Patient verbalized understanding and all questions were answered.    Pharmacy Notes/Proposed recommendations:  Upon admission, the following medications should be held to reduce the pill burden: multivitamins, B-complex, rosuvastatin, and vitamin D. These can be safely restarted upon discharge.    For pharmacy: The patient and his caregiver expressed interest in utilizing a pill box for medication management. It was explained that they need to provide a pill box, and pharmacy can assist  with filling it weekly up to day +30 after discharge. Additionally, they requested a log sheet to track medication administration. I confirmed that we would provide a comprehensive list detailing each medication's time of administration, which medications should be held, which ones will be discontinued, and which are taken as needed. We can provide something else to log administrations. They acknowledged their understanding of this plan.    I advised the patient and his caregiver that since he will be staying at the [facility], it is important for him to bring all of his medications for use after discharge.    The patient expressed some hesitance regarding medications due to concerns about potential side effects, particularly with neuropathy medications, which he noted are sometimes used for conditions like depression or anxiety. I clarified that many medications have multiple indications, and it is common for drugs, such as duloxetine, to be prescribed for both neuropathy and mental health conditions. I also reassured him that side effects listed for medications do not guarantee he will experience them; they are merely possibilities meant to inform patients so they can communicate effectively with their provider if they occur.    Regarding cardiovascular protection, the patient is currently using aspirin and understands that we will continue this medication while he is inpatient. Once platelet levels drop to 50,000, we will hold the medication, and it can be restarted once platelet levels allow, whether during the inpatient stay or after discharge.  It is worth noting that the patient previously used Xarelto for DVT treatment in 2021 but has completed that regimen and is no longer on it.    The patient has tried CBD gummies for insomnia but is not currently using them. He had considered a consultation for a CBD distillery but decided against it after I mentioned the potential for drug interactions. He previously  tried zolpidem without success but is open to retaking melatonin at a higher dose, such as 10 mg. I recommended he inform his providers, as we have additional options available.    For pain management: The patient uses Norco, primarily for neuropathy, taking a maximum of two tablets per day as needed. I informed him that during and after the transplant, we would be using an alternative that does not include Tylenol, as it could mask fever. They acknowledged understanding.    The patient has utilized Zofran and Phenergan for nausea and has found some relief from stomach symptoms with these medications. He has also tried Phenergan at night to aid with sleep.    He is currently taking pantoprazole, and I recommended its continuation after discharge for stomach symptom management if deemed necessary.    Revlimid was left on the list, as he is still undergoing treatment, but it should not be continued upon admission.    The patient takes a probiotic; however, I advised against its use during his inpatient stay and after discharge due to the risk of infections. They inquired about its continuation, and I recommended against it. His counts have appeared stable thus far but he is still immunocompromised while on treatment.    He takes tizanidine as needed.    There is no documented history of shingles, and I cannot find any records or reasoning for the use of Valtrex. Therefore, I believe it would be appropriate for him to use acyclovir 800 mg. I informed him that either medication would need to be taken twice daily for a year post-transplant, and he indicated his understanding.    No history of invasive fungal infections, or C. diff in the patients medical history.    The patient understands that he will begin taking Bactrim on day +30, continuing it for six months post-transplant.    Drug Interactions:     Category D:    - Tizanidine/Acyclovir/Valacyclovir: CY Inhibitors (Weak) may increase the serum concentration of  TiZANidine.     - Aspirin/Meloxicam: Nonsteroidal Anti-Inflammatory Agents (Nonselective) may enhance the adverse/toxic effect of Salicylates. An increased risk of bleeding may be associated with use of this combination. Nonsteroidal Anti-Inflammatory Agents (Nonselective) may diminish the cardioprotective effect of Salicylates. Salicylates may decrease the serum concentration of Nonsteroidal Anti-Inflammatory Agents (Nonselective).     -Norco/ Tizanidine/Phenergan: CNS Depressants may enhance the CNS depressant effect of Opioid Agonists.           The patient demonstrates good medication adherence and understanding of the chemotherapy and transplant plan. BMT/Hematology Oncology PharmD will continue to follow the patient while admitted to the inpatient unit.     Say Chapa, PharmD  Clinical Pharmacy Specialist, Bone Marrow Transplant/Hematology  Spectra link: [phone]

## 2025-03-12 DIAGNOSIS — Z76.82 STEM CELL TRANSPLANT CANDIDATE: Primary | ICD-10-CM

## 2025-03-12 RX ORDER — LANOLIN ALCOHOL/MO/W.PET/CERES
800 CREAM (GRAM) TOPICAL ONCE AS NEEDED
OUTPATIENT
Start: 2025-03-30

## 2025-03-12 RX ORDER — SODIUM,POTASSIUM PHOSPHATES 280-250MG
2 POWDER IN PACKET (EA) ORAL ONCE AS NEEDED
OUTPATIENT
Start: 2025-03-31

## 2025-03-12 RX ORDER — PLERIXAFOR 20 MG/ML
0.24 INJECTION, SOLUTION SUBCUTANEOUS ONCE AS NEEDED
OUTPATIENT
Start: 2025-03-31 | End: 2036-08-27

## 2025-03-12 RX ORDER — POTASSIUM CHLORIDE 20 MEQ/1
40 TABLET, EXTENDED RELEASE ORAL ONCE AS NEEDED
OUTPATIENT
Start: 2025-03-31

## 2025-03-12 RX ORDER — DIPHENHYDRAMINE HCL 25 MG
25 CAPSULE ORAL ONCE AS NEEDED
OUTPATIENT
Start: 2025-04-01

## 2025-03-12 RX ORDER — DIPHENHYDRAMINE HCL 25 MG
25 CAPSULE ORAL ONCE AS NEEDED
OUTPATIENT
Start: 2025-04-03

## 2025-03-12 RX ORDER — ACETAMINOPHEN 325 MG/1
650 TABLET ORAL ONCE AS NEEDED
OUTPATIENT
Start: 2025-03-28

## 2025-03-12 RX ORDER — DIPHENHYDRAMINE HCL 25 MG
25 CAPSULE ORAL ONCE AS NEEDED
OUTPATIENT
Start: 2025-04-02

## 2025-03-12 RX ORDER — DIPHENHYDRAMINE HCL 25 MG
25 CAPSULE ORAL ONCE AS NEEDED
OUTPATIENT
Start: 2025-03-30

## 2025-03-12 RX ORDER — SODIUM,POTASSIUM PHOSPHATES 280-250MG
2 POWDER IN PACKET (EA) ORAL ONCE AS NEEDED
OUTPATIENT
Start: 2025-04-02

## 2025-03-12 RX ORDER — ACETAMINOPHEN 325 MG/1
650 TABLET ORAL ONCE AS NEEDED
OUTPATIENT
Start: 2025-03-29

## 2025-03-12 RX ORDER — SODIUM,POTASSIUM PHOSPHATES 280-250MG
2 POWDER IN PACKET (EA) ORAL ONCE AS NEEDED
OUTPATIENT
Start: 2025-03-29

## 2025-03-12 RX ORDER — LANOLIN ALCOHOL/MO/W.PET/CERES
800 CREAM (GRAM) TOPICAL ONCE AS NEEDED
OUTPATIENT
Start: 2025-04-03

## 2025-03-12 RX ORDER — POTASSIUM CHLORIDE 20 MEQ/1
40 TABLET, EXTENDED RELEASE ORAL ONCE AS NEEDED
OUTPATIENT
Start: 2025-04-01

## 2025-03-12 RX ORDER — LANOLIN ALCOHOL/MO/W.PET/CERES
800 CREAM (GRAM) TOPICAL ONCE AS NEEDED
OUTPATIENT
Start: 2025-03-29

## 2025-03-12 RX ORDER — SODIUM,POTASSIUM PHOSPHATES 280-250MG
2 POWDER IN PACKET (EA) ORAL ONCE AS NEEDED
OUTPATIENT
Start: 2025-04-01

## 2025-03-12 RX ORDER — PLERIXAFOR 20 MG/ML
0.24 INJECTION, SOLUTION SUBCUTANEOUS ONCE AS NEEDED
OUTPATIENT
Start: 2025-04-03 | End: 2036-08-30

## 2025-03-12 RX ORDER — ACETAMINOPHEN 325 MG/1
650 TABLET ORAL ONCE AS NEEDED
OUTPATIENT
Start: 2025-03-30

## 2025-03-12 RX ORDER — LANOLIN ALCOHOL/MO/W.PET/CERES
800 CREAM (GRAM) TOPICAL ONCE AS NEEDED
OUTPATIENT
Start: 2025-03-28

## 2025-03-12 RX ORDER — POTASSIUM CHLORIDE 20 MEQ/1
40 TABLET, EXTENDED RELEASE ORAL ONCE AS NEEDED
OUTPATIENT
Start: 2025-04-03

## 2025-03-12 RX ORDER — LANOLIN ALCOHOL/MO/W.PET/CERES
800 CREAM (GRAM) TOPICAL ONCE AS NEEDED
OUTPATIENT
Start: 2025-03-31

## 2025-03-12 RX ORDER — POTASSIUM CHLORIDE 20 MEQ/1
40 TABLET, EXTENDED RELEASE ORAL ONCE AS NEEDED
OUTPATIENT
Start: 2025-03-29

## 2025-03-12 RX ORDER — ACETAMINOPHEN 325 MG/1
650 TABLET ORAL ONCE AS NEEDED
OUTPATIENT
Start: 2025-04-01

## 2025-03-12 RX ORDER — DIPHENHYDRAMINE HCL 25 MG
25 CAPSULE ORAL ONCE AS NEEDED
OUTPATIENT
Start: 2025-03-29

## 2025-03-12 RX ORDER — DIPHENHYDRAMINE HCL 25 MG
25 CAPSULE ORAL ONCE AS NEEDED
OUTPATIENT
Start: 2025-03-31

## 2025-03-12 RX ORDER — SODIUM,POTASSIUM PHOSPHATES 280-250MG
2 POWDER IN PACKET (EA) ORAL ONCE AS NEEDED
OUTPATIENT
Start: 2025-03-30

## 2025-03-12 RX ORDER — ACETAMINOPHEN 325 MG/1
650 TABLET ORAL ONCE AS NEEDED
OUTPATIENT
Start: 2025-04-02

## 2025-03-12 RX ORDER — SODIUM,POTASSIUM PHOSPHATES 280-250MG
2 POWDER IN PACKET (EA) ORAL ONCE AS NEEDED
OUTPATIENT
Start: 2025-03-28

## 2025-03-12 RX ORDER — POTASSIUM CHLORIDE 20 MEQ/1
40 TABLET, EXTENDED RELEASE ORAL ONCE AS NEEDED
OUTPATIENT
Start: 2025-04-02

## 2025-03-12 RX ORDER — LANOLIN ALCOHOL/MO/W.PET/CERES
800 CREAM (GRAM) TOPICAL ONCE AS NEEDED
OUTPATIENT
Start: 2025-04-01

## 2025-03-12 RX ORDER — ACETAMINOPHEN 325 MG/1
650 TABLET ORAL ONCE AS NEEDED
OUTPATIENT
Start: 2025-03-31

## 2025-03-12 RX ORDER — SODIUM,POTASSIUM PHOSPHATES 280-250MG
2 POWDER IN PACKET (EA) ORAL ONCE AS NEEDED
OUTPATIENT
Start: 2025-04-03

## 2025-03-12 RX ORDER — LANOLIN ALCOHOL/MO/W.PET/CERES
800 CREAM (GRAM) TOPICAL ONCE AS NEEDED
OUTPATIENT
Start: 2025-04-02

## 2025-03-12 RX ORDER — DIPHENHYDRAMINE HCL 25 MG
25 CAPSULE ORAL ONCE AS NEEDED
OUTPATIENT
Start: 2025-03-28

## 2025-03-12 RX ORDER — ACETAMINOPHEN 325 MG/1
650 TABLET ORAL ONCE AS NEEDED
OUTPATIENT
Start: 2025-04-03

## 2025-03-12 RX ORDER — PLERIXAFOR 20 MG/ML
0.24 INJECTION, SOLUTION SUBCUTANEOUS ONCE AS NEEDED
OUTPATIENT
Start: 2025-04-01 | End: 2036-08-28

## 2025-03-12 RX ORDER — POTASSIUM CHLORIDE 20 MEQ/1
40 TABLET, EXTENDED RELEASE ORAL ONCE AS NEEDED
OUTPATIENT
Start: 2025-03-28

## 2025-03-12 RX ORDER — PLERIXAFOR 20 MG/ML
0.24 INJECTION, SOLUTION SUBCUTANEOUS ONCE AS NEEDED
OUTPATIENT
Start: 2025-04-02 | End: 2036-08-29

## 2025-03-12 RX ORDER — POTASSIUM CHLORIDE 20 MEQ/1
40 TABLET, EXTENDED RELEASE ORAL ONCE AS NEEDED
OUTPATIENT
Start: 2025-03-30

## 2025-03-12 NOTE — PROGRESS NOTES
The patient location is: [place]  The chief complaint leading to consultation is: evaluation/education prior to SCT    Visit type: audiovisual    Call time with patient: 45  60 minutes of total time spent on the encounter, which includes face to face time and non-face to face time preparing to see the patient (eg, review of tests), Obtaining and/or reviewing separately obtained history, Documenting clinical information in the electronic or other health record, Independently interpreting results (not separately reported) and communicating results to the patient/family/caregiver, or Care coordination (not separately reported).     Each patient to whom he or she provides medical services by telemedicine is:  (1) informed of the relationship between the physician and patient and the respective role of any other health care provider with respect to management of the patient; and (2) notified that he or she may decline to receive medical services by telemedicine and may withdraw from such care at any time.    Notes:  Oncology Nutrition Assessment   Evaluation and Education Note    Mitchel Joya  1960    Transplant History:   Primary oncologist: Dr. Beltrán/Dr. Hemphill  Primary oncologic diagnosis: MM  Transplant type and date: Autologous SCT on 04/08/25  Conditioning Regimen: n/a  Immediate post-transplant complications: n/a    Social History:   Caregiver: partner and children  Post-transplant discharge plans: [facility] Pre-Transplant Nutrition History:   Appetite/Intake:  Regular Diet - aiming to eat higher fiber and protein. Eating 3 meals plus snacks daily. No ONS.   Cultural/Spiritual/Personal Preferences: No Preferences    Living Situation: lives with partner  Who: Shops for Groceries? Patient and partner  Who: Prepare meals? Patient and partner  Eating out: 0-1x/week.  Allergies: Patient has no known allergies.     PMHx: Past Medical History[1]     Nutrition Assessment    Anthropometrics:   Weight:   Wt  "Readings from Last 10 Encounters:   03/10/25 99 kg (218 lb 4.1 oz)   03/05/25 99.8 kg (220 lb)   01/30/25 105 kg (231 lb 7.7 oz)   12/20/21 103.1 kg (227 lb 4.7 oz)   02/23/19 99.3 kg (219 lb)                         Ht Readings from Last 1 Encounters:   03/10/25 5' 8.9" (1.75 m)      BMI Readings from Last 1 Encounters:   03/10/25 32.33 kg/m²     Estimated body surface area is 2.19 meters squared as calculated from the following:    Height as of 3/10/25: 5' 8.9" (1.75 m).    Weight as of 3/10/25: 99 kg (218 lb 4.1 oz).         Encounter Notes:  Mitchel Joya is a 64 y.o. male being seen for nutrition evaluation and education prior to Autologous SCT. Patient presents to audio only visit alone. Weight loss of 13 lbs in 2 months noted.  Likely related to increase intake . Nutrition impact symptoms related to previous treatment listed below.    Nutrition Impact Symptoms    [x] No nutritional concerns at current  [] Poor Appetite   [] Weight loss   [] Nausea - mild  [] Vomiting   [] Diarrhea   [] Constipation - with pain medication used for neuropathy.   [] Early Satiety [] Change in smell   [] Change in taste   [] Dry Mouth   [] Thick saliva   [] Dysphagia   [] Difficulty chewing  [] Mucositis  [] Indigestion  [] Gas/Bloating  [] Reflux      [] Fatigue     [] other, please specify- history of neuropathy      Current Medications:  Current Outpatient Medications   Medication Instructions    aspirin (ECOTRIN) 81 mg, Daily    b complex vitamins capsule 1 capsule, Daily    HYDROcodone-acetaminophen (NORCO)  mg per tablet 1 tablet, Every 6 hours PRN    lenalidomide 25 mg Cap 1 capsule, Daily    levothyroxine (SYNTHROID) 88 mcg, Before breakfast    meloxicam (MOBIC) 15 MG tablet 1 tablet, Daily    multivitamin (THERAGRAN) per tablet 1 tablet, Daily    ondansetron (ZOFRAN-ODT) 8 mg, Every 6 hours PRN    pantoprazole (PROTONIX) 40 mg, Oral, Daily    promethazine (PHENERGAN) 25 mg, 2 times daily PRN    rosuvastatin " (CRESTOR) 20 mg, Nightly    sildenafiL (VIAGRA) 45 mg, Daily PRN    tiZANidine (ZANAFLEX) 4 mg, Nightly PRN    valACYclovir (VALTREX) 500 mg, Daily    vitamin D (VITAMIN D3) 1,000 Units, Daily     Labs: Reviewed 01/30/25: alb 3.4     Nutrition Diagnosis    Nutrition Problem: Food and nutrition related knowledge deficit  Etiology (related to): lack of prior need for nutrition education  Signs/Symptoms (as evidenced by): referral for SCT evaluation    Nutrition Intervention    Nutrition Prescription  N/A due to pre-transplant education/evaluation encounter    Recommendations:   Review details of [facility] Nutrition Guide review during visit   Encouraged safe food practices.    Materials Provided/Reviewed: [facility] Nutrition Guide, none  Barriers to Learning: none identified  Patient and/or Family Verbalizes understanding: yes     Nutrition Monitoring and Evaluation    Monitor: energy intake, weight, diet education needs , and symptom management     Follow up upon RTC post Autologous SCT  Day +21 (04/29/25)    Communication to referring provider/care team: Note available in chart.     Consultation Time: 45 Minutes    Yoni GEORGEAP, , LDN  [facility] in Clinical Nutrition  Board Certified Specialist in Oncology Nutrition   [facility]  [phone]          [1]   Past Medical History:  Diagnosis Date    Hyperlipidemia

## 2025-03-13 ENCOUNTER — SOCIAL WORK (OUTPATIENT)
Dept: HEMATOLOGY/ONCOLOGY | Facility: CLINIC | Age: 65
End: 2025-03-13
Payer: COMMERCIAL

## 2025-03-13 ENCOUNTER — CLINICAL SUPPORT (OUTPATIENT)
Dept: HEMATOLOGY/ONCOLOGY | Facility: CLINIC | Age: 65
End: 2025-03-13
Payer: COMMERCIAL

## 2025-03-13 DIAGNOSIS — Z76.82 STEM CELL TRANSPLANT CANDIDATE: ICD-10-CM

## 2025-03-13 DIAGNOSIS — Z71.3 NUTRITIONAL COUNSELING: Primary | ICD-10-CM

## 2025-03-13 DIAGNOSIS — C90.00 MULTIPLE MYELOMA NOT HAVING ACHIEVED REMISSION: ICD-10-CM

## 2025-03-13 NOTE — PROGRESS NOTES
"  [facility]   Bone Marrow Transplant Psychosocial Assessment   Date: 2025       Demographic Information     Name: Mitchel Joya    : 1960    Age: 64 y.o.    Sex: male    Race: White    Marital Status: Single   SS #:     Phone Number(s): [phone] (home)     Home Address: [address] [place] [address] [address] [id number]    Mailing Address: [address] [place] [address] [address] [id number]    Are you a [place] Citizen? Yes     Contact Information     Next of Kin: Janette Joya   Relationship: daughter   Phone Number(s): [phone]   Emergency Contact: Extended Emergency Contact Information  Primary Emergency Contact: Cherie Joya  Mobile Phone: [phone]  Relation: Friend        Living Arrangements   Household Composition:  Patient currently resides with: Other Cherie   If patient resides with spouse, please explain the marital relationship: Ex-wife, but very supportive/involved as caregiver   Does the patient currently own his/her own home? Yes   Does the patient currently rent home/apartment: No   Are current living arrangements permanent? Yes   If no, please explain: Has his own home in [place] with 90% renovations done excluding the back shed; staying with Cherie at her home at [address] [place] [address] while in treatment       Children's Names     Name Sex Age   1.  Janette female 40   2.  Dave male 35   3. Diana female 23         Support System   Primary Caregiver:     Name: TBD   Relationship: Children vs sitters                               Secondary Caregiver:     Name: Cherie (nights/weekends)   Relationship: Ex-wife/caregiver   Cell #: [phone]   Address: [address] [place] [address]   Home #:    City: [place]   Street: [place]   ZIP: [address]     Will patient's caregiver be available full-time? TBD   What is the patient's Quaker? Patient Refused "Sabianist"   Is patient currently practicing or non-practicing? Yes      Does patient have any other sources for support? No      If yes, please explain:  " "      Post BMT Plans      Does patient have full understanding of recovery from BMT? No   Does patient understand risk associated with BMT? Yes   What are patient's housing plans post BMT? [place]   Does patient have a Living Will? No   Does patient have a Power of ?  No               Employment Information     Is patient currently employed? Yes   Employer: [id number]   Phone #: Data Unavailable   Position: acquisitions   Full Time/Part Time? full time   YRS: 8     Significant Other Employment Information     Employer: [employer]   Phone #:    Position: cytotechnologist   Full Time/Part Time? full time   YRS: 3         Financial Information     Monthly Income: $6041.67   Yearly Income: $72,500 (his salary only, no mixed finances)   Source of Income:  salary   Do you have any financial concerns? Yes   If yes, please explain:  Income while not working       Insurance Information      Do you have health insurance?  Yes   Insurance Carrier [employer]   Policy #: [id number]   Group #: [id number]   Policy Lam: self   Medicare: No   Medicare Part D: No   Medicaid: No   Do you have Disability Insurance? Yes  FMLA/short+long-term disability      Do you have a Cancer Policy? No   Do you have medication/prescription coverage? Yes   Are you a ? No       Medical Information     Diagnosis: No diagnosis found. "MM"   Date of Diagnosis: 10/14/2024   Is this a new diagnosis? Yes         If no, please explain:    Past Medical History: Past Medical History:   Diagnosis Date    Hyperlipidemia       Infusion Services: Following septicemia, unsure of provider   Home Health: [facility] in the past   Durable Medical Equipment: Crutches, cane, walker, no CPAP for ANA   Activities of Daily Living: independent   Patient's Family Cancer History: Cancer-related family history is not on file."None"       Cognitive Functioning     Cognitive State: alert   Does patient have any concerns " "that may affect medical follow up and full understanding of treatment? No   Does patient have any concerns that may impact medication compliance? No   Education Level: high school diploma/GED   Does the patient have any learning disabilities? Yes   If yes, please explain: ADHD   Can the patient read English? Yes   Can the patient write in English? Yes      Is the patient Literate? Yes   What is the patient's primary language? English   Does the patient need interpretation services? No        Psychosocial History     Does patient have any emotional issues? Yes   If, yes please explain:  Hx of anxiety per chart; Hx ADHD, insomnia   Does patient have a psychiatric history? No   If, yes please explain:     Is patient currently taking any psychiatric medication? No   If yes, please list medications: Prescribed Buspar, never took it   Is patient currently in therapy or attending support groups? No                   Alcohol/Drug Use/Abuse History     Alcohol Use: past glass wine nightly; quit on Dx Social History     Substance and Sexual Activity   Alcohol Use Yes    Comment: occ      Tobacco Use: none Tobacco Use History[1]   Drug Use: none Social History     Substance and Sexual Activity   Drug Use Not on file          Coping Skills     How is the patient currently coping with their diagnosis? Plays guitar when discouraged/physically uncomfortable   Is the patient open/receptive to psychosocial intervention? Yes   Has the patient experienced any significant losses in his/her life? No      If yes, please explain:    What are the patient's identified needs? Lodging, caregivers   Goals: "Find good things on the other side" of transplant   Interview Behavior: Open, cooperative   Suitability for Transplant: Questionable   Additional Comments:  And Mrs. Joya were open and cooperative throughout assessment for auto SCT on 3/10.  Despite being his ex-wife, she is very involved in his care, supportive, and attuned to his " needs.  His children will be his primary caregivers at the [facility], with Cherie acting as caregiver nights and weekends while continuing to work; a schedule has yet to be worked out; they were provided information about sitter services. Cherie expresses she is willing to take time off of work if needed, but finances would be problematic should they both not be working.  They both had a fair understanding of risks and benefits of transplant and the recovery process.  Both signed the caregiver contract without reservation.  All parties are eligible for the [facility], with no needs for wheelchair access, service animals, and no criminal convictions.  Once schedule for caregivers is resolved, there are no others barriers to transplant noted.                [1]   Social History  Tobacco Use   Smoking Status Never   Smokeless Tobacco Never

## 2025-03-18 ENCOUNTER — OFFICE VISIT (OUTPATIENT)
Dept: PSYCHIATRY | Facility: CLINIC | Age: 65
End: 2025-03-18
Payer: COMMERCIAL

## 2025-03-18 ENCOUNTER — TELEPHONE (OUTPATIENT)
Dept: HEMATOLOGY/ONCOLOGY | Facility: CLINIC | Age: 65
End: 2025-03-18
Payer: COMMERCIAL

## 2025-03-18 DIAGNOSIS — Z01.818 PRE-TRANSPLANT EVALUATION FOR STEM CELL TRANSPLANT: Primary | ICD-10-CM

## 2025-03-18 DIAGNOSIS — C90.00 MULTIPLE MYELOMA NOT HAVING ACHIEVED REMISSION: ICD-10-CM

## 2025-03-18 DIAGNOSIS — Z76.82 STEM CELL TRANSPLANT CANDIDATE: Primary | ICD-10-CM

## 2025-03-18 DIAGNOSIS — Z76.82 STEM CELL TRANSPLANT CANDIDATE: ICD-10-CM

## 2025-03-18 PROCEDURE — 99999 PR PBB SHADOW E&M-EST. PATIENT-LVL II: CPT | Mod: PBBFAC,,,

## 2025-03-18 PROCEDURE — 1160F RVW MEDS BY RX/DR IN RCRD: CPT | Mod: CPTII,S$GLB,, | Performed by: PSYCHOLOGIST

## 2025-03-18 PROCEDURE — 99999 PR PBB SHADOW E&M-EST. PATIENT-LVL II: CPT | Mod: PBBFAC,,, | Performed by: PSYCHOLOGIST

## 2025-03-18 PROCEDURE — 1159F MED LIST DOCD IN RCRD: CPT | Mod: CPTII,S$GLB,, | Performed by: PSYCHOLOGIST

## 2025-03-18 PROCEDURE — 96146 PSYCL/NRPSYC TST AUTO RESULT: CPT | Mod: S$GLB,,, | Performed by: PSYCHOLOGIST

## 2025-03-18 NOTE — LETTER
March 24, 2025        Kevin Hemphill MD  [address]  [facility] [address]             [facility] - Psychiatry  [address] [place] [address]  Phone: [phone]  Fax: [phone]   Patient: Mitchel Joya   MR Number: [id number]   YOB: 1960   Date of Visit: 3/18/2025       Dear Dr. Hemphill:    Thank you for referring Mitchel Joya to me for evaluation. Below are the relevant portions of my assessment and plan of care.       Mitchel Joya is a  64 y.o. male referred by Dr. Hemphill for psychological evaluation prior to stem cell transplantation.  The patient appears absent of disabling psychopathology or disabilities which would prevent understanding and compliance with medical treatment.  There is no evidence of suicidality.   The patient has good knowledge about HSCT, appropriate expectations for health and illness following transplantation, adequate understanding of the possible risks and complications of this treatment option, and a high willingness to sustain effort for lifestyle changes and health adaptations which will be required of him. He is aware of the 30 day 1 hour residence requirement.  He reports adequate compliance with previous medical treatment.  Mitchel Joya has excellent social support from his partner and adult children.   The patient exhibits a high degree of social stability.  The patient acknowledges no stressors expected to limit his ability to cope with the demands of HSCT and recovery.  The patient reports The patient reports no tobacco use, limited, social alcohol use, no illicit drug use, and moderate caffeine consumption  He demonstrates adequate health literacy.    He does demonstrate some mild impairment in cognitive functioning, but retains the ability to complete all ADLs and has the ability to consent to HSCT based upon knowledge and understanding of the procedure.      IMPRESSIONS AND RECOMMENDATIONS  Mitchel Joya is an  acceptable HSCT candidate from a psychological perspective. He will be an adequate partner with his team in working toward wellness. He is likely to be adherent to treatment recommendations, but may minimize physical difficulties in order to seem agreeable. He will benefit from direct instruction and open information from team members.  There are no overt psychological contraindications for proceeding with the procedure.He has no significant mental health history, and reports no current psychiatric problems or major adjustment issues.  The patient has reasonable expectations for the procedure, good social support, and has already begun making appropriate life plans in anticipation of the procedure. The patient has verbalized appropriate awareness and commitment to the necessary behavioral changes associated with HSCT and appears willing to adjust to long-term lifestyle challenges and medical follow-up. There are no recommendations for psychological treatment at this time. The patient and family are aware of resources available should their psychological needs change in the future.       If you have questions, please do not hesitate to call me. I look forward to following Mitchel along with you.    Sincerely,      Yoni Ford, PhD           CC  Yael Diaz RN

## 2025-03-18 NOTE — PROGRESS NOTES
Computer Administered Psychological Testing (55387)  Date:  3/18/2025     CPT Code: 45069 Evaluation Length:  45 minutes      Referred by:  BMT Team/ Oncologist: ALESSANDRO Hemphill MD.     Chief complaint/reason for encounter:  Psychological Evaluation prior to stem cell transplantation      Mitchel Joya is an 64 y.o. male referred by ALESSANDRO Hemphill MD for pre-transplant evaluation.     Mitchel Joya arrived promptly for the scheduled appointment during which computer-administered psychological testing of the [facility] Behavioral Medicine Diagnostic (MBMD) was conducted. Patient informed of the risks and benefits of testing, was instructed how to navigate the computer program, and was informed of the importance of accurate responding.  Patient expressed understanding and willingly engaged in the assessment.      Risk Questions on the MBMD (Q49, Q86) were negative indicating the patient denied thoughts of suicide and denied that he cannot find things in life that were interesting/pleasurable.    Mitchel Joya 's complete assessment report will be included in the medical record with any additional testing instruments  compiled with appropriate   interview data, summary, and recommendations.  Patient will be provided feedback on the assessment results at BMT Evaluation visit with the psychologist.       ICD-10-CM ICD-9-CM   1. Pre-transplant evaluation for stem cell transplant  Z01.818 V72.83   2. Multiple myeloma not having achieved remission  C90.00 203.00       Yoni Ford, PhD  Clinical Psychologist  [place] License #[id number]  [place] License #[id number]

## 2025-03-18 NOTE — TELEPHONE ENCOUNTER
Patient's central line is scheduled for 3/28. Pt confirmed he is currently taking Aspirin but not Xarelto. Informed pt to hold Aspirin at least 5 days prior to line placement, 3/23 and can resume after line placement. Pt verbalized understanding.

## 2025-03-18 NOTE — PROGRESS NOTES
Psycho-Oncology Pre-Transplant Evaluation  Psychiatry Initial Visit (PhD)  Psychological Intake and Assessment    Date:  3/18/2025     CPT Code:   22499 (1hr) , Psychiatric Diagnostic Evaluation  72904 (1hr), Psychological testing evaluation services by physician or other qualified health care professional  14339 (1st 30 minutes), Psychological or neuropsychological test administration/scoring by physician or other qualified healthcare professional, two or more tests    Evaluation Length (direct face-to-face time):  1.5 hours   Total Time including report writing, test scoring, chart review, integration of data and feedback: 2.5 hours       Referred by:  Oncologist: ALESSANDRO Hemphill MD.     Chief complaint/reason for encounter:  Psychological Evaluation prior to bone marrow tranplantation    Clinical status of patient: Outpatient    Mitchel Joya, a 64 y.o. male, was seen for initial evaluation visit.  Met with patient. His primary care physician is Juan Pablo Pereira MD.        INFORMED CONSENT/ LIMITS of CONFIDENTIALITY: Prior to beginning the interview, the patient's identification was confirmed using two identifiers.  Mitchel Joya was informed of the possible risks and benefits of psychological interventions (e.g., counseling, psychotherapy, testing) and provided information regarding the handling of protected health records and   the limits of confidentiality, including the importance of reporting any suicidal or homicidal ideation to ensure safety of all parties and the duty to protect children, seniors, or persons with disabilities. This provider explained the purpose of today's appointment and the patient was provided with time to ask questions regarding this information.  Acceptance and understanding of these conditions was expressed, and Mitchel Joya freely consented to this evaluation.         Psychological Intake  Medical/Surgical History:   Patient Active Problem List   Diagnosis    ANA (obstructive sleep  "apnea)    LPRD (laryngopharyngeal reflux disease)    Chronic anticoagulation    Obesity (BMI 30.0-34.9)    Multiple myeloma not having achieved remission    Stem cell transplant candidate        Health Behaviors:       ETOH Use: Yes (rare and social)      Tobacco Use: No   Illicit Drug Use:  No     Prescription Misuse:No   Caffeine: 1 c coffee or tea per day, or less   Exercise:The patient engages in intermittent activity (mostly environmental, occasional strength training)    Family History:   Psychiatric illness: Yes (Daughter anxiety, son emotional difficulties due to his mother's behavior in childhood)    Alcohol/Drug Abuse:     Suicide:      Past Psychiatric History:   Inpatient treatment: No     Outpatient treatment: Yes (Marital therapy in past)    Prior substance abuse treatment: No     Suicide Attempts: No      Psychotropic Medications:  Current: none       Past: Buspar (prescribed, not taken)     Cymbalta (prescribed for pain, stopped after 2 days, "made me feel weird")    Current medications as per below, allergies reviewed in chart.  Current Outpatient Medications   Medication    aspirin (ECOTRIN) 81 MG EC tablet    b complex vitamins capsule    HYDROcodone-acetaminophen (NORCO)  mg per tablet    lenalidomide 25 mg Cap    levothyroxine (SYNTHROID) 88 MCG tablet    meloxicam (MOBIC) 15 MG tablet    multivitamin (THERAGRAN) per tablet    ondansetron (ZOFRAN-ODT) 8 MG TbDL    pantoprazole (PROTONIX) 40 MG tablet    promethazine (PHENERGAN) 25 MG tablet    rosuvastatin (CRESTOR) 20 MG tablet    sildenafiL (VIAGRA) 100 MG tablet    tiZANidine (ZANAFLEX) 4 MG tablet    valACYclovir (VALTREX) 500 MG tablet    vitamin D (VITAMIN D3) 1000 units Tab     No current facility-administered medications for this visit.       Social situation  Living/Social History  Born/raised:  [place]  Current living situation: lives with ex-wife/current partner (Cherie) in [place], [place]  Marital status: 2x ; now " "partnered x 15 years (he and Cherie were  for 5,  for several years, back together for 15)  Partner/Spouse Name: Cherie Joya; first wife was Patricia ( due to drug overdose)  Family of Origin: Parents:   Siblings:2 living brothers, 1 brother  in a work accident when patient was 22  Children/Dependents: 3 adult children (plus 2 adult step-children of Cherie), Janette, Dave, Diana; 4 grandchildren  Social support: excellent    Primary supports: significant other and children    Spiritual/Church:  agnostic .  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   Partner/Spouse Employment Status: employedn ([email or web address] at [employer])    Educational  Education level: technical college      Stressors: Current: Complicated medical illness  Additional stressors: grief-2 cousins  in 2 months in   Strengths:Housing stability, Motivation, readiness for change, Setting and pursuing goals, hopes, dreams, aspirations, Vocational interests, hobbies and/or talents, Interpersonal relationships and supports available - family, relatives, friends, and Financial stability      History of present illness: Mr. Joya is a 63yo male with IgG kappa multiple myeloma being considered for autologous stem cell transplant.    Mitchel Joya has adjusted to illness fairly well primarily through active coping strategies.  He states this process has been "surreal." He has engaged in appropriate information gathering.  The patient has excellent family support.  His family is coping well with the diagnosis/treatment/prognosis.    Mitchel Joya reports using music and time with family and friends  as his primary methods of coping with general stressors.  Illness-related psychosocial stressors include changes in ability to engage in leisure activities. These stressors will not " prevent patient from adhering to post-transplant requirements.  The patient has a good partnership with his [facility] [place] treatment team. The patient reports the following barriers to cancer care:none.    Stem Cell Transplantation (SCT):  Mitchel Joya possesses a good level of knowledge about autologous SCT gleaned from materials provided by his clinicians, discussions with his clinical team, and the internet .  Mitchel Joya is knowledgeable about the possible costs, risks, and complications of the procedure and the behavioral changes which will be required of him.  He has anticipated his recovery needs and has planned adequate time away from work, assistance from Cherie and his children, and residence at [facility] to facilitate healing. Mitchel Joya is aware of the requirement that autologous HSCT patients must stay within 1 hour of the hospital for their first 30 days post-transplant.  Mitchel Joya knows he must commit to careful monitoring of symptoms, the possibility of a complex long-term multiple drug treatment regimen, and long term follow-up visits with his oncologist (as required) following the procedure.  He is aware of the following necessary behavioral changes:changes in food selection, preparation, and storage, careful personal and dental hygiene , changes to modes of pet care , and rapid return to physical activity. The patient reports good compliance with medical treatment in the past, which is supported by review of his medical chart.  Mitchel Joya has realistic expectations of health and illness possibilities following SCT. He is aware of possible medical side effects including infection, organ toxicity/failure , hair loss, GI difficulties , loss of appetite, fatigue , neurocognitive changes, neuropathy, and mucositis  during/following treatment. He is aware of the risk of mortality. He also anticipates social strains including decreased ability to participate in some leisure and social  "activities for some time and the strains of care-giving demands on friends/family.      Collateral Information:  no concerns expressed by team    Current Symptoms:  Mood: denied;  prior depression:possibly during 1st marriage and no SI/HI;   Funmilayo: Denies symptoms of funmilayo, including distractibility, grandiosity, delusions, hallucinations, decreased sleep with increase in activity, talkativeness, or risky behavior.    Psychosis: Denies  Anxiety: denied; no prior;   Generalized anxiety: Denies       Panic Disorder: Denies  Social/specific phobia: Denies   Substance abuse: denied  Is the patient willing to submit to a random drug screen?  Yes (Drugs of abuse screen negative)  Cognitive functioning: denied  Health behaviors: noncontributory  Can the patient identify own medications and describe purpose and proper dosing? Yes  Does the patient appropriately manage chronic conditions which need close monitoring (such as diabetes)? N/A  Does the patient use complementary or alternative medications, remedies, procedures, or interventions? No   Sleep:  The patient reports poor sleep, overall, exacerbated by steroids with treatment. restless sleep  and non-restorative sleep, 30 min+ extended sleep onset latency, no problems with re-onset after awakening, possible apneic events (no prior "clear" sleep study, 5+ years since last attempt), "worse with increased weight," (+) psychophysiological factors; current medications for nausea and neuropathy decrease sleep onset latency  Pain: Mr. Joya reports 5/10 pain (neuropathy).     Trauma: Brother killed OTJ when patient was 22, killed instantly, "took it hard," but denies post-traumatic sequelae (patient was not present at accident)  Head Injury History: No LOC  Personality Functioning: The patient does not display any personality characteristics which would be an impediment to receiving BMT.      Psychological Assessment/Testing:     Distress thermometer:       3/12/2025     5:24 " PM   DISTRESS SCREENING   Distress Score 5   Practical Concerns Work   Social Concerns None of these   Emotional Concerns None of these   Spiritual or Spiritism Concerns None of these   Physical Concerns Pain;Loss or change of physical abilities            PHQ-9    Q1. Little interest or pleasure in doing things: Several days (03/12/25 1727)  Q2. Feeling down, depressed, or hopeless: Not at all (03/12/25 1727)  Q3. Trouble falling or staying asleep, or sleeping too much: Several days (03/12/25 1727)  Q4. Feeling tired or having little energy: Several days (03/12/25 1727)  Q5. Poor appetite or overeating: Not at all (03/12/25 1727)  Q6. Feeling bad about yourself - or that you are a failure or have let yourself or your family down: Not at all (03/12/25 1727)  Q7. Trouble concentrating on things, such as reading the newspaper or watching television: Not at all (03/12/25 1727)  Q8. Moving or speaking so slowly that other people could have noticed. Or the opposite - being so fidgety or restless that you have been moving around a lot more than usual: Not at all (03/12/25 1727)  Q9.      PHQ8 Score : 3 (03/12/25 1727)  PHQ-9 Total Score: 3 (03/12/25 1727)     The patient completed the Patient Health Questionnaire-9 item (PHQ-9) Depression Screen and received a score of 3, indicating no/ minimal  depression symptoms presently impacting current functioning.    DINESH-7        3/12/2025     5:26 PM   GAD7   1. Feeling nervous, anxious, or on edge? 0   2. Not being able to stop or control worrying? 0   3. Worrying too much about different things? 0   4. Trouble relaxing? 0   5. Being so restless that it is hard to sit still? 0   6. Becoming easily annoyed or irritable? 0   7. Feeling afraid as if something awful might happen? 0   8. If you checked off any problems, how difficult have these problems made it for you to do your work, take care of things at home, or get along with other people? 0   DINESH-7 Score 0     DINESH-7 Score:  0  Interpretation: Normal     The patient completed the General Anxiety Disorder, 7 Item Questionnaire (GAD7) and received a score of 0, indicating no anxiety symptoms presently impacting current functioning.    PCS:   Pain catastrophizing: There were no clinically significant elevations on PCS total score (7; 17th%ile), helplessness (2; 16th%ile), magnification (3;  50th%ile), or rumination (2; 13th%ile) which suggests adequate pain coping.     MOCA  [place] Cognitive Assessment (MOCA), a cognitive screener, was administered to assess the patient's current mental status and cognitive capacity. Patient obtained a score of 24/30. This score does not fall within normal limits as compared to those within similar age and level of education, and suggests mild impairments in neurocognitive functioning.  He is currently undergoing cancer treatment, which is associated with temporary decline in cognitive functioning.  His deficits are not disproportionate to his current cancer diagnosis and treatment. Areas of difficulty were delayed memory, calculation, language (repetition).    REALM-R:   The Rapid Estimate of Adult Literacy in Medicine, Revised (REALM-R) is a brief screening instrument used to assess an adult patient's ability to read common medical words. It is designed to assist medical professionals in identifying patients at risk for poor understanding of written healthcare communication.  Results:  Sufficient health literacy      MILLON BEHAVIORAL MEDICINE DIAGNOSTIC (MBMD)                                    The MBMD provides an assessment of the potential role of psychiatric factors in a patient's disease and treatment. Mitchel Joya produced a valid MBMD profile. Validity indices suggested some concerns in the areas of Disclosure and Desirability though not enough to invalidate results. The elevation of the Desirability scale suggests that Mitchel Joya may be reporting to ensure that he is presented in a  positive light. Elevation in the area of Disclosure may indicate that the patient is hesitant to share some information.  All additional scales will be interpreted with this information in mind.    The patient's profile suggests the presence of  no  anxiety, mild depression, no  cognitive issues, mild difficulty with moodiness or mood swings, and no apprehension to being open with others about these issues. He may struggle with overeating and inactivity, which may be exacerbated by stress. There are no indications he may struggle with overuse of EtOH, overuse of caffeine, use of substances, or tobacco.     In regard to COPING STYLES, a key scale shows response patterns expected to POSITIVELY influence adarsh-procedural course (Cooperative). Mitchel Joya's responses indicate that he will likely be adherent to treatment recommendations and good-natured with team members. He is likely to become well-bonded to his clinicians, but may minimize physical difficulties in order to seem agreeable. He will benefit from direct instruction from team members.     The STRESS MODERATORS scales assess factors which have the potential to influence patient responses to treatment.  Mitchel Joya obtained moderate elevations on the Stress Moderators scales Illness Apprehension, Functional Deficits, Pain Sensitivity, and Future Pessimism, an dsignificant elevation on the Spiritual Absence scale. Profiles similar to Mitchel Joya indicate the patient may have high awareness of changes in his physical functioning, which may be beneficial to him with regard to being proactive in seeking medical treatment, but may cause him some worry about his physical health. He may require more emotional support from team members than other patients. Pain may be coloring his overall outlook on his life and may be limiting his ability to picture a productive future.  His low scores on the Social Isolation scale suggests high levels of family support.      Scores on the TREATMENT PROGNOSTIC scales and MANAGEMENT GUIDES indices reveal that Mitchel Joya is receptive to information about his health and comfortable learning about details of his illness. He is likely to feel confident in his ability to cope with activities, roles, and responsibilities of daily life and is adjusting adequately to his illness and treatment. The patient's responses indicate that he is not likely to require assistance to overcome his psychosocial needs and additional psychotherapeutic support is not likely to be necessary.  It should be noted that, given Mitchel Joya's significant medical problems and his reluctance to provide information which might paint him in a negative light, future, unanticipated distress may arise.       Mental Status Exam:    General appearance:  appears stated age, neatly dressed, well groomed  Level of cooperation:  cooperative  Thought processes:  logical, goal-directed   Speech: normal in rate, volume, and tone, talkative, slightly rambling    Mood: euthymic  Affect: euthymic  Thought content:  no illusions, no visual hallucinations, no auditory hallucinations, no delusions, no active or passive homicidal thoughts, no active or passive suicidal ideation, no obsessions, no compulsions, no violence  Orientation:  oriented to person, place, and time  Memory:  Recent memory:  3 of 5 objects after brief delay.    Remote memory - intact  Attention span and concentration:  spelled WORLD forwards and backwards; attention task without difficulty  Abstract reasoning:    Similarities: abstract.    Proverbs: abstract.  Judgment and insight: good   Language:   fluency and naming tasks performed without impairment, repetition impaired    SUMMARY:  Mitchel Joya is a  64 y.o. male referred by Dr. Hemphill for psychological evaluation prior to stem cell transplantation.  The patient appears absent of disabling psychopathology or disabilities which would prevent understanding  and compliance with medical treatment.  There is no evidence of suicidality.   The patient has good knowledge about HSCT, appropriate expectations for health and illness following transplantation, adequate understanding of the possible risks and complications of this treatment option, and a high willingness to sustain effort for lifestyle changes and health adaptations which will be required of him. He is aware of the 30 day 1 hour residence requirement.  He reports adequate compliance with previous medical treatment.  Mitchel Joya has excellent social support from his partner and adult children.   The patient exhibits a high degree of social stability.  The patient acknowledges no stressors expected to limit his ability to cope with the demands of HSCT and recovery.  The patient reports The patient reports no tobacco use, limited, social alcohol use, no illicit drug use, and moderate caffeine consumption  He demonstrates adequate health literacy.    He does demonstrate some mild impairment in cognitive functioning, but retains the ability to complete all ADLs and has the ability to consent to HSCT based upon knowledge and understanding of the procedure.      IMPRESSIONS AND RECOMMENDATIONS  Mitchel Joya is an acceptable HSCT candidate from a psychological perspective. He will be an adequate partner with his team in working toward wellness. He is likely to be adherent to treatment recommendations, but may minimize physical difficulties in order to seem agreeable. He will benefit from direct instruction and open information from team members.  There are no overt psychological contraindications for proceeding with the procedure.He has no significant mental health history, and reports no current psychiatric problems or major adjustment issues.  The patient has reasonable expectations for the procedure, good social support, and has already begun making appropriate life plans in anticipation of the procedure. The  patient has verbalized appropriate awareness and commitment to the necessary behavioral changes associated with HSCTand appears willing to adjust to long-term lifestyle challenges and medical follow-up. There are no recommendations for psychological treatment at this time. The patient and family are aware of resources available should their psychological needs change in the future.  He was encouraged to pursue re-assessment of his ANA after recovery from transplant.      1. Multiple myeloma not having achieved remission    2. Stem cell transplant candidate        Yoni Ford, PhD  Clinical Psychologist  [place] License #[id number]  [place] License #[id number]  [place] License #[id number]

## 2025-03-24 ENCOUNTER — OFFICE VISIT (OUTPATIENT)
Dept: HEMATOLOGY/ONCOLOGY | Facility: CLINIC | Age: 65
End: 2025-03-24
Payer: COMMERCIAL

## 2025-03-24 ENCOUNTER — DOCUMENTATION ONLY (OUTPATIENT)
Dept: HEMATOLOGY/ONCOLOGY | Facility: CLINIC | Age: 65
End: 2025-03-24

## 2025-03-24 VITALS
WEIGHT: 219.81 LBS | HEART RATE: 77 BPM | HEIGHT: 69 IN | SYSTOLIC BLOOD PRESSURE: 147 MMHG | OXYGEN SATURATION: 98 % | TEMPERATURE: 99 F | BODY MASS INDEX: 32.56 KG/M2 | DIASTOLIC BLOOD PRESSURE: 92 MMHG

## 2025-03-24 DIAGNOSIS — D84.81 IMMUNODEFICIENCY DUE TO CONDITIONS CLASSIFIED ELSEWHERE: ICD-10-CM

## 2025-03-24 DIAGNOSIS — Z76.82 STEM CELL TRANSPLANT CANDIDATE: Primary | ICD-10-CM

## 2025-03-24 DIAGNOSIS — C90.01 MULTIPLE MYELOMA IN REMISSION: ICD-10-CM

## 2025-03-24 PROCEDURE — 99999 PR PBB SHADOW E&M-EST. PATIENT-LVL IV: CPT | Mod: PBBFAC,,, | Performed by: INTERNAL MEDICINE

## 2025-03-24 RX ORDER — PREGABALIN 25 MG/1
75 CAPSULE ORAL NIGHTLY
COMMUNITY
Start: 2025-01-07

## 2025-03-24 RX ORDER — DULOXETIN HYDROCHLORIDE 20 MG/1
20 CAPSULE, DELAYED RELEASE ORAL
COMMUNITY
Start: 2025-03-17

## 2025-03-24 NOTE — LETTER
March 24, 2025        Mark Saravia MD  [address], [address]  [place] [address]             [facility] - Hematology 5th Fl  [address]  [facility] [address]  Phone: [phone]   Patient: Mitchel Joya   MR Number: [id number]   YOB: 1960   Date of Visit: 3/24/2025       Dear Dr. Saravia:        Please see my note regarding our mutual patient, Mitchel Joya.     If you have questions, please do not hesitate to call me.           Sincerely,      Kevin Hemphill MD            CC  No Recipients    Enclosure

## 2025-03-24 NOTE — PROGRESS NOTES
Consent status for submitting research data to [facility]:    Patient accepts [facility] study.

## 2025-03-24 NOTE — PROGRESS NOTES
Section of Hematology and Stem Cell Transplantation  New Patient Consult     Date of visit: 3/24/25  Referred by:  Mark Saravia MD  Reason for visit: Autologous stem cell transplant candidate    Oncologic History:     Primary Oncologic Diagnosis: IgG lambda multiple myeloma, standard risk, R-ISS stage  NA    Presentation: Anemia  Initial workup:  Labs:  Hemoglobin: NA  Creatinine: NA  Calcium: NA  Serologic studies:  POOJA: IgG lambda and IgG kappa  SPEP: M spike of 3.2  Free light chains: Ratio >400  Immunoglobulins: NA  PET/CT:  10/15/24: PET CT with widely metastatic lytic bone lesions in the left scapula, right glenoid, left posterior 5th rib, right posterior 8th rib, and patchy lesions throughout spine and pelvis   Bone marrow biopsy:  10/14/24: Sparse maturing myelopoiesis and erythropoiesis. 95% plasma cells.   FISH: Clonal plasma cell population (42%) with aberrant expression of CD56 and , cytoplasmic lambda light chain restriction. Gain of chromosome 9, 15, 11q. Gain of 1q21. 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  SPEP with 0.16 g/dL and 0.12 g/dL. FLCs normal.   PET/CT (3/7/25) - no new hypermetabolic lesions.    History of Present Ilness:   Mitchel Joya (Mitchel) is a pleasant 64 y.o.male with multiple myeloma who presents for consent signing. He is doing very well. He continues to have peripheral neuropathy. He recently started pregabalin (<2 weeks ago), but he has not noticed any significant improvements.     PAST MEDICAL HISTORY:   Past Medical History:   Diagnosis Date    Hyperlipidemia        PAST SURGICAL HISTORY:    Past Surgical History:   Procedure Laterality Date    BONE MARROW BIOPSY Right 3/5/2025    Procedure: Biopsy-bone marrow;  Surgeon: Kevin Hemphill MD;  Location: [address]);  Service: Oncology;  Laterality: Right;    CHOLECYSTECTOMY      heart stint      KNEE SURGERY      left    SHOULDER SURGERY      SPINE SURGERY         PAST SOCIAL HISTORY:  Social History     Tobacco Use    Smoking status: Never    Smokeless tobacco: Never   Substance Use Topics    Alcohol use: Yes     Comment: occ    Drug use: Never       FAMILY HISTORY:  Family History   Problem Relation Name Age of Onset    No Known Problems Mother      Heart disease Father         CURRENT MEDICATIONS:   Current Outpatient Medications   Medication Sig    aspirin (ECOTRIN) 81 MG EC tablet Take 81 mg by mouth once daily.    b complex vitamins capsule Take 1 capsule by mouth once daily.    DULoxetine (CYMBALTA) 20 MG capsule Take 20 mg by mouth.    levothyroxine (SYNTHROID) 88 MCG tablet Take 88 mcg by mouth before breakfast.    meloxicam (MOBIC) 15 MG tablet Take 1 tablet by mouth once daily.    multivitamin (THERAGRAN) per tablet Take 1 tablet by mouth once daily.    ondansetron (ZOFRAN-ODT) 8 MG TbDL Take 8 mg by mouth every 6 (six) hours as needed (Nausea/Vomiting).    pantoprazole (PROTONIX) 40 MG tablet Take 1 tablet (40 mg total) by mouth once daily.    pregabalin (LYRICA) 25 MG capsule Take by mouth every evening.    promethazine (PHENERGAN) 25 MG tablet Take 25 mg by mouth 2 (two) times daily as needed.    rosuvastatin (CRESTOR) 20 MG tablet Take 20 mg by mouth every evening.    sildenafiL (VIAGRA) 100 MG tablet Take 45 mg by mouth daily as needed for Erectile Dysfunction.    valACYclovir (VALTREX) 500 MG tablet Take 500 mg by mouth once daily.    vitamin D (VITAMIN D3) 1000 units Tab Take 1,000 Units by mouth once daily.    HYDROcodone-acetaminophen (NORCO)  mg per tablet Take 1 tablet by mouth every 6 (six) hours as needed for  Pain. (Patient not taking: Reported on 3/24/2025)    lenalidomide 25 mg Cap Take 1 capsule by mouth once daily. (Patient not taking: Reported on 3/24/2025.)    tiZANidine (ZANAFLEX) 4 MG tablet Take 4 mg by mouth nightly as needed. (Patient not taking: Reported on 3/24/2025)     No current facility-administered medications for this visit.       ALLERGIES:   Review of patient's allergies indicates:  No Known Allergies    Review of Systems:     Pertinent positives and negatives included in the HPI. Otherwise a complete review of systems is negative.    Physical Exam:     Vitals:    03/24/25 1251   BP: (!) 147/92   Pulse: 77   Temp: 98.6 °F (37 °C)       General: Appears well, NAD  HEENT: MMM, no OP lesions  Pulmonary: CTAB, no increased work of breathing, no W/R/C  Cardiovascular: S1S2 normal, RRR, no M/R/G  Abdominal: Soft, NT, ND, BS+, no HSM  Extremities: No C/C/E  Neurological: AAOx4, grossly normal, no focal deficits  Dermatologic: No appreciable rashes or lesions  Lymphatic: No cervical, axillary, or inguinal lymphadenopathy     ECOG Performance Status: (foot note - ECOG PS provided by [employer]cology [employer]) 1 - Symptomatic but completely ambulatory    Karnofsky Performance Score:  90%- Able to Carry on Normal Activity: Minor Symptoms of Disease    Labs:   Lab Results   Component Value Date    WBC 4.66 03/10/2025    RBC 4.51 (L) 03/10/2025    HGB 13.3 (L) 03/10/2025    HCT 41.5 03/10/2025    MCV 92 03/10/2025    MCH 29.5 03/10/2025    MCHC 32.0 03/10/2025    RDW 16.9 (H) 03/10/2025     03/10/2025    MPV 12.2 03/10/2025    GRAN 1.7 (L) 03/10/2025    GRAN 37.4 (L) 03/10/2025    LYMPH 1.5 03/10/2025    LYMPH 33.0 03/10/2025    MONO 0.8 03/10/2025    MONO 16.1 (H) 03/10/2025    EOS 0.6 (H) 03/10/2025    BASO 0.05 03/10/2025    EOSINOPHIL 12.2 (H) 03/10/2025    BASOPHIL 1.1 03/10/2025       CMP  Sodium   Date Value Ref Range Status   03/10/2025 136 136 - 145 mmol/L Final     Potassium   Date Value  Ref Range Status   03/10/2025 3.5 3.5 - 5.1 mmol/L Final     Chloride   Date Value Ref Range Status   03/10/2025 102 95 - 110 mmol/L Final     CO2   Date Value Ref Range Status   03/10/2025 27 23 - 29 mmol/L Final     Glucose   Date Value Ref Range Status   03/10/2025 101 70 - 110 mg/dL Final     BUN   Date Value Ref Range Status   03/10/2025 10 8 - 23 mg/dL Final     Creatinine   Date Value Ref Range Status   03/10/2025 0.8 0.5 - 1.4 mg/dL Final     Calcium   Date Value Ref Range Status   03/10/2025 9.4 8.7 - 10.5 mg/dL Final     Total Protein   Date Value Ref Range Status   03/10/2025 6.8 6.0 - 8.4 g/dL Final     Albumin   Date Value Ref Range Status   03/10/2025 3.7 3.5 - 5.2 g/dL Final     Total Bilirubin   Date Value Ref Range Status   03/10/2025 0.3 0.1 - 1.0 mg/dL Final     Comment:     For infants and newborns, interpretation of results should be based  on gestational age, weight and in agreement with clinical  observations.    Premature Infant recommended reference ranges:  Up to 24 hours.............<8.0 mg/dL  Up to 48 hours............<12.0 mg/dL  3-5 days..................<15.0 mg/dL  6-29 days.................<15.0 mg/dL       Alkaline Phosphatase   Date Value Ref Range Status   03/10/2025 84 40 - 150 U/L Final     AST   Date Value Ref Range Status   03/10/2025 27 10 - 40 U/L Final     ALT   Date Value Ref Range Status   03/10/2025 48 (H) 10 - 44 U/L Final     Anion Gap   Date Value Ref Range Status   03/10/2025 7 (L) 8 - 16 mmol/L Final       Imaging:  Reviewed     Pathology:  Reviewed      Assessment and Plan:   Mitchel Joya (Mitchel) is a pleasant 64 y.o.male with multiple myeloma who presents for follow up.    Autologous stem cell transplant candidate:  We discussed the role for autologous stem cell transplantation in multiple myeloma as a means to improve progression free survival (not curative) and provide prolonged disease control. We had an extensive discussion about the rationale, logistics,  risks, and benefits. We reviewed the requirement to stay in the [place] area for 30 days with a caregiver at all times. We discussed the risks, including infection, organ toxicity, relapse of disease, and secondary cancers. We reviewed the need for maintenance therapy starting around day 100. Consent signed today.   - Coordinator: Xochilt Diaz  - Conditioning Regimen: Melphalan 200 mg/m2  - Cell Dose: 5-10 x 10^6 CD34/kg  - Maintenance: Korina+Rev  - Caregiver: Partner   - Housing: [place]     Multiple myeloma, standard risk, R-ISS stage  NA :  His oncologic history is detailed above. He remains on treatment with Korina VRd. He achieved VGPR with induction pre-transplant. Ok to hold treatment going forward in anticipation of collection and transplant.     Immunodeficiency due to conditions classified elsewhere:  Continue valacyclovir     At high risk for thrombosis:  Continue aspirin.    Orders/Follow Up:           Route Chart for Scheduling    BMT Chart Routing      Follow up with physician . Per Xochilt   Follow up with [facility]    Provider visit type    Infusion scheduling note    Injection scheduling note    Labs    Imaging    Pharmacy appointment    Other referrals                    Supportive Plan Information  OP STEM CELL MOBILIZATION (FILGRASTIM/PLERIXAFOR) La Nena Pastrana NP   Associated Diagnosis: Stem cell transplant candidate   noted on 3/12/2025  Associated Diagnosis: Multiple myeloma not having achieved remission   noted on 3/10/2025   Line of treatment: Supportive Care   Treatment goal: Supportive     Upcoming Treatment Dates - OP STEM CELL MOBILIZATION (FILGRASTIM/PLERIXAFOR)    3/28/2025       Supportive Care       filgrastim (NEUPOGEN) injection 480 mcg/1.6 ml  3/29/2025       Supportive Care       filgrastim (NEUPOGEN) injection 480 mcg/1.6 ml  3/30/2025       Supportive Care       filgrastim (NEUPOGEN) injection 480 mcg/1.6 ml  3/31/2025       Supportive Care       filgrastim (NEUPOGEN)  injection 480 mcg/1.6 ml      Total time of this visit was 45 minutes, including time spent face to face with patient and/or via video/audio, and also in preparing for today's visit for MDM and documentation. (Medical Decision Making, including consideration of possible diagnoses, management options, complex medical record review, review of diagnostic tests and information, consideration and discussion of significant complications based on comorbidities, and discussion with providers involved with the care of the patient). Greater than 50% was spent face to face with the patient counseling and coordinating care.    Hector Hemphill MD  Malignant Hematology, Stem Cell Transplant, and Cellular Therapy  [facility]  [facility]

## 2025-03-27 ENCOUNTER — TELEPHONE (OUTPATIENT)
Dept: SURGERY | Facility: CLINIC | Age: 65
End: 2025-03-27
Payer: COMMERCIAL

## 2025-03-27 ENCOUNTER — PATIENT MESSAGE (OUTPATIENT)
Dept: INTERNAL MEDICINE | Facility: CLINIC | Age: 65
End: 2025-03-27
Payer: COMMERCIAL

## 2025-03-28 ENCOUNTER — ANESTHESIA EVENT (OUTPATIENT)
Dept: SURGERY | Facility: HOSPITAL | Age: 65
End: 2025-03-28
Payer: COMMERCIAL

## 2025-03-28 ENCOUNTER — HOSPITAL ENCOUNTER (OUTPATIENT)
Facility: HOSPITAL | Age: 65
Discharge: HOME | End: 2025-03-28
Attending: STUDENT IN AN ORGANIZED HEALTH CARE EDUCATION/TRAINING PROGRAM | Admitting: STUDENT IN AN ORGANIZED HEALTH CARE EDUCATION/TRAINING PROGRAM
Payer: COMMERCIAL

## 2025-03-28 ENCOUNTER — INFUSION (OUTPATIENT)
Dept: INFUSION THERAPY | Facility: HOSPITAL | Age: 65
End: 2025-03-28
Payer: COMMERCIAL

## 2025-03-28 ENCOUNTER — ANESTHESIA (OUTPATIENT)
Dept: SURGERY | Facility: HOSPITAL | Age: 65
End: 2025-03-28
Payer: COMMERCIAL

## 2025-03-28 VITALS
HEIGHT: 69 IN | DIASTOLIC BLOOD PRESSURE: 88 MMHG | RESPIRATION RATE: 16 BRPM | WEIGHT: 218 LBS | HEART RATE: 67 BPM | BODY MASS INDEX: 32.29 KG/M2 | TEMPERATURE: 98 F | OXYGEN SATURATION: 99 % | SYSTOLIC BLOOD PRESSURE: 157 MMHG

## 2025-03-28 DIAGNOSIS — Z76.82 STEM CELL TRANSPLANT CANDIDATE: Primary | ICD-10-CM

## 2025-03-28 DIAGNOSIS — C90.00 MULTIPLE MYELOMA NOT HAVING ACHIEVED REMISSION: ICD-10-CM

## 2025-03-28 PROCEDURE — 63600175 PHARM REV CODE 636 W HCPCS: Performed by: STUDENT IN AN ORGANIZED HEALTH CARE EDUCATION/TRAINING PROGRAM

## 2025-03-28 PROCEDURE — 27200651 HC AIRWAY, LMA: Performed by: STUDENT IN AN ORGANIZED HEALTH CARE EDUCATION/TRAINING PROGRAM

## 2025-03-28 PROCEDURE — 25000003 PHARM REV CODE 250: Performed by: NURSE ANESTHETIST, CERTIFIED REGISTERED

## 2025-03-28 PROCEDURE — 25000003 PHARM REV CODE 250

## 2025-03-28 PROCEDURE — 36000706: Performed by: STUDENT IN AN ORGANIZED HEALTH CARE EDUCATION/TRAINING PROGRAM

## 2025-03-28 PROCEDURE — 37000008 HC ANESTHESIA 1ST 15 MINUTES: Performed by: STUDENT IN AN ORGANIZED HEALTH CARE EDUCATION/TRAINING PROGRAM

## 2025-03-28 PROCEDURE — 36558 INSERT TUNNELED CV CATH: CPT | Mod: RT,,, | Performed by: STUDENT IN AN ORGANIZED HEALTH CARE EDUCATION/TRAINING PROGRAM

## 2025-03-28 PROCEDURE — 63600175 PHARM REV CODE 636 W HCPCS: Performed by: NURSE ANESTHETIST, CERTIFIED REGISTERED

## 2025-03-28 PROCEDURE — C1751 CATH, INF, PER/CENT/MIDLINE: HCPCS | Performed by: STUDENT IN AN ORGANIZED HEALTH CARE EDUCATION/TRAINING PROGRAM

## 2025-03-28 PROCEDURE — C1750 CATH, HEMODIALYSIS,LONG-TERM: HCPCS | Performed by: STUDENT IN AN ORGANIZED HEALTH CARE EDUCATION/TRAINING PROGRAM

## 2025-03-28 PROCEDURE — 63600175 PHARM REV CODE 636 W HCPCS: Mod: JZ,TB

## 2025-03-28 PROCEDURE — 96372 THER/PROPH/DIAG INJ SC/IM: CPT

## 2025-03-28 PROCEDURE — 77001 FLUOROGUIDE FOR VEIN DEVICE: CPT | Mod: 26,,, | Performed by: STUDENT IN AN ORGANIZED HEALTH CARE EDUCATION/TRAINING PROGRAM

## 2025-03-28 PROCEDURE — 36000707: Performed by: STUDENT IN AN ORGANIZED HEALTH CARE EDUCATION/TRAINING PROGRAM

## 2025-03-28 PROCEDURE — 71000044 HC DOSC ROUTINE RECOVERY FIRST HOUR: Performed by: STUDENT IN AN ORGANIZED HEALTH CARE EDUCATION/TRAINING PROGRAM

## 2025-03-28 PROCEDURE — 37000009 HC ANESTHESIA EA ADD 15 MINS: Performed by: STUDENT IN AN ORGANIZED HEALTH CARE EDUCATION/TRAINING PROGRAM

## 2025-03-28 PROCEDURE — 99223 1ST HOSP IP/OBS HIGH 75: CPT | Mod: 25,,, | Performed by: STUDENT IN AN ORGANIZED HEALTH CARE EDUCATION/TRAINING PROGRAM

## 2025-03-28 PROCEDURE — 71000015 HC POSTOP RECOV 1ST HR: Performed by: STUDENT IN AN ORGANIZED HEALTH CARE EDUCATION/TRAINING PROGRAM

## 2025-03-28 DEVICE — HEMOSPLIT HEMODIALYSIS CATH, ALPHACURVE, AIRGUARD, 14.5 FR. 19CM
Type: IMPLANTABLE DEVICE | Site: JUGULAR | Status: FUNCTIONAL
Brand: HEMOSPLIT LONG-TERM HEMODIALYSIS CATHETER

## 2025-03-28 RX ORDER — HALOPERIDOL LACTATE 5 MG/ML
0.5 INJECTION, SOLUTION INTRAMUSCULAR EVERY 10 MIN PRN
Status: DISCONTINUED | OUTPATIENT
Start: 2025-03-28 | End: 2025-03-28 | Stop reason: HOSPADM

## 2025-03-28 RX ORDER — EPHEDRINE SULFATE 50 MG/ML
INJECTION, SOLUTION INTRAVENOUS
Status: DISCONTINUED | OUTPATIENT
Start: 2025-03-28 | End: 2025-03-28

## 2025-03-28 RX ORDER — BUPIVACAINE HYDROCHLORIDE 2.5 MG/ML
INJECTION, SOLUTION EPIDURAL; INFILTRATION; INTRACAUDAL; PERINEURAL
Status: DISCONTINUED | OUTPATIENT
Start: 2025-03-28 | End: 2025-03-28 | Stop reason: HOSPADM

## 2025-03-28 RX ORDER — HEPARIN 100 UNIT/ML
SYRINGE INTRAVENOUS
Status: DISCONTINUED | OUTPATIENT
Start: 2025-03-28 | End: 2025-03-28 | Stop reason: HOSPADM

## 2025-03-28 RX ORDER — ONDANSETRON HYDROCHLORIDE 2 MG/ML
INJECTION, SOLUTION INTRAVENOUS
Status: DISCONTINUED | OUTPATIENT
Start: 2025-03-28 | End: 2025-03-28

## 2025-03-28 RX ORDER — PROPOFOL 10 MG/ML
VIAL (ML) INTRAVENOUS
Status: DISCONTINUED | OUTPATIENT
Start: 2025-03-28 | End: 2025-03-28

## 2025-03-28 RX ORDER — DEXAMETHASONE SODIUM PHOSPHATE 4 MG/ML
INJECTION, SOLUTION INTRA-ARTICULAR; INTRALESIONAL; INTRAMUSCULAR; INTRAVENOUS; SOFT TISSUE
Status: DISCONTINUED | OUTPATIENT
Start: 2025-03-28 | End: 2025-03-28

## 2025-03-28 RX ORDER — CEFAZOLIN 2 G/1
2 INJECTION, POWDER, FOR SOLUTION INTRAMUSCULAR; INTRAVENOUS
Status: COMPLETED | OUTPATIENT
Start: 2025-03-28 | End: 2025-03-28

## 2025-03-28 RX ORDER — LIDOCAINE HYDROCHLORIDE 20 MG/ML
INJECTION INTRAVENOUS
Status: DISCONTINUED | OUTPATIENT
Start: 2025-03-28 | End: 2025-03-28

## 2025-03-28 RX ORDER — POTASSIUM CHLORIDE 20 MEQ/1
40 TABLET, EXTENDED RELEASE ORAL ONCE AS NEEDED
Status: DISCONTINUED | OUTPATIENT
Start: 2025-03-28 | End: 2025-03-28 | Stop reason: HOSPADM

## 2025-03-28 RX ORDER — SODIUM CHLORIDE 0.9 % (FLUSH) 0.9 %
10 SYRINGE (ML) INJECTION
Status: DISCONTINUED | OUTPATIENT
Start: 2025-03-28 | End: 2025-03-28 | Stop reason: HOSPADM

## 2025-03-28 RX ORDER — GLUCAGON 1 MG
1 KIT INJECTION
Status: DISCONTINUED | OUTPATIENT
Start: 2025-03-28 | End: 2025-03-28 | Stop reason: HOSPADM

## 2025-03-28 RX ORDER — ACETAMINOPHEN 500 MG
1000 TABLET ORAL ONCE AS NEEDED
Status: COMPLETED | OUTPATIENT
Start: 2025-03-28 | End: 2025-03-28

## 2025-03-28 RX ORDER — FENTANYL CITRATE 50 UG/ML
25 INJECTION, SOLUTION INTRAMUSCULAR; INTRAVENOUS EVERY 5 MIN PRN
Status: DISCONTINUED | OUTPATIENT
Start: 2025-03-28 | End: 2025-03-28 | Stop reason: HOSPADM

## 2025-03-28 RX ORDER — LANOLIN ALCOHOL/MO/W.PET/CERES
800 CREAM (GRAM) TOPICAL ONCE AS NEEDED
Status: DISCONTINUED | OUTPATIENT
Start: 2025-03-28 | End: 2025-03-28 | Stop reason: HOSPADM

## 2025-03-28 RX ORDER — DIPHENHYDRAMINE HCL 25 MG
25 CAPSULE ORAL ONCE AS NEEDED
Status: DISCONTINUED | OUTPATIENT
Start: 2025-03-28 | End: 2025-03-28 | Stop reason: HOSPADM

## 2025-03-28 RX ORDER — LIDOCAINE HYDROCHLORIDE 10 MG/ML
INJECTION, SOLUTION EPIDURAL; INFILTRATION; INTRACAUDAL; PERINEURAL
Status: DISCONTINUED
Start: 2025-03-28 | End: 2025-03-28 | Stop reason: HOSPADM

## 2025-03-28 RX ORDER — FENTANYL CITRATE 50 UG/ML
INJECTION, SOLUTION INTRAMUSCULAR; INTRAVENOUS
Status: DISCONTINUED | OUTPATIENT
Start: 2025-03-28 | End: 2025-03-28

## 2025-03-28 RX ORDER — SODIUM,POTASSIUM PHOSPHATES 280-250MG
2 POWDER IN PACKET (EA) ORAL ONCE AS NEEDED
Status: DISCONTINUED | OUTPATIENT
Start: 2025-03-28 | End: 2025-03-28 | Stop reason: HOSPADM

## 2025-03-28 RX ORDER — MIDAZOLAM HYDROCHLORIDE 1 MG/ML
INJECTION INTRAMUSCULAR; INTRAVENOUS
Status: DISCONTINUED | OUTPATIENT
Start: 2025-03-28 | End: 2025-03-28

## 2025-03-28 RX ORDER — SODIUM CHLORIDE 9 MG/ML
INJECTION, SOLUTION INTRAVENOUS CONTINUOUS
Status: DISCONTINUED | OUTPATIENT
Start: 2025-03-28 | End: 2025-03-28 | Stop reason: HOSPADM

## 2025-03-28 RX ORDER — ACETAMINOPHEN 325 MG/1
650 TABLET ORAL ONCE AS NEEDED
Status: DISCONTINUED | OUTPATIENT
Start: 2025-03-28 | End: 2025-03-28 | Stop reason: HOSPADM

## 2025-03-28 RX ADMIN — PROPOFOL 200 MG: 10 INJECTION, EMULSION INTRAVENOUS at 07:03

## 2025-03-28 RX ADMIN — ACETAMINOPHEN 1000 MG: 500 TABLET ORAL at 09:03

## 2025-03-28 RX ADMIN — DEXAMETHASONE SODIUM PHOSPHATE 4 MG: 4 INJECTION, SOLUTION INTRAMUSCULAR; INTRAVENOUS at 07:03

## 2025-03-28 RX ADMIN — FILGRASTIM 960 MCG: 480 INJECTION, SOLUTION INTRAVENOUS; SUBCUTANEOUS at 09:03

## 2025-03-28 RX ADMIN — MIDAZOLAM HYDROCHLORIDE 2 MG: 2 INJECTION, SOLUTION INTRAMUSCULAR; INTRAVENOUS at 07:03

## 2025-03-28 RX ADMIN — ONDANSETRON 4 MG: 2 INJECTION INTRAMUSCULAR; INTRAVENOUS at 08:03

## 2025-03-28 RX ADMIN — FENTANYL CITRATE 100 MCG: 50 INJECTION, SOLUTION INTRAMUSCULAR; INTRAVENOUS at 07:03

## 2025-03-28 RX ADMIN — CEFAZOLIN 2 G: 2 INJECTION, POWDER, FOR SOLUTION INTRAMUSCULAR; INTRAVENOUS at 07:03

## 2025-03-28 RX ADMIN — EPHEDRINE SULFATE 10 MG: 50 INJECTION INTRAVENOUS at 07:03

## 2025-03-28 RX ADMIN — SODIUM CHLORIDE, SODIUM GLUCONATE, SODIUM ACETATE, POTASSIUM CHLORIDE, MAGNESIUM CHLORIDE, SODIUM PHOSPHATE, DIBASIC, AND POTASSIUM PHOSPHATE: .53; .5; .37; .037; .03; .012; .00082 INJECTION, SOLUTION INTRAVENOUS at 07:03

## 2025-03-28 RX ADMIN — SODIUM CHLORIDE: 0.9 INJECTION, SOLUTION INTRAVENOUS at 07:03

## 2025-03-28 RX ADMIN — LIDOCAINE HYDROCHLORIDE 100 MG: 20 INJECTION INTRAVENOUS at 07:03

## 2025-03-28 NOTE — H&P
"General Surgery  History & Physical    SUBJECTIVE:     History of Present Illness:  Mr. Joya is a 64 y.o. male with a history of multiple myeloma who presents for hemosplit catheter placement for stem cell transplantation. Denies recents changes to their medical history or medications. NPO since yesterday. Denies recent anticoagulation use. They have no further questions at this time and would like to proceed with surgery.      On examination, Mr. Joya is sitting comfortably in the room upon entering in no acute distress. He denies headaches, malaise, shortness of breath, chest pain, or nausea, vomiting, fever, chills, or nightsweats. He denies any abdominal pain, and the abdomen is soft, nontender, and nondistended on palpation.    Review of patient's allergies indicates:  No Known Allergies    Current Medications[1]    Past Medical History:   Diagnosis Date    Hyperlipidemia      Past Surgical History:   Procedure Laterality Date    BONE MARROW BIOPSY Right 3/5/2025    Procedure: Biopsy-bone marrow;  Surgeon: Kevin Hemphill MD;  Location: [address]);  Service: Oncology;  Laterality: Right;    CHOLECYSTECTOMY      heart stint      KNEE SURGERY      left    SHOULDER SURGERY      SPINE SURGERY       Family History   Problem Relation Name Age of Onset    No Known Problems Mother      Heart disease Father       Social History[2]     Review of Systems:  Review of Systems  Pertinent items from 14 system review are noted in HPI, all others negative.    OBJECTIVE:     Vital Signs (Most Recent)  Temp: 98.1 °F (36.7 °C) (03/28/25 0610)  Pulse: 67 (03/28/25 0614)  Resp: 18 (03/28/25 0610)  BP: (!) 146/87 (03/28/25 0610)  SpO2: 98 % (03/28/25 0610)  5' 9" (1.753 m)  98.9 kg (218 lb)     Physical Exam  Constitutional:       General: He is not in acute distress.  HENT:      Mouth/Throat:      Mouth: Mucous membranes are moist.   Eyes:      Extraocular Movements: Extraocular movements intact.      " Conjunctiva/sclera: Conjunctivae normal.   Cardiovascular:      Rate and Rhythm: Normal rate.      Pulses: Normal pulses.   Pulmonary:      Effort: Pulmonary effort is normal. No respiratory distress.   Abdominal:      General: There is no distension.      Palpations: Abdomen is soft.      Tenderness: There is no abdominal tenderness. There is no guarding or rebound.   Skin:     General: Skin is warm and dry.   Neurological:      General: No focal deficit present.      Mental Status: He is alert.   Psychiatric:         Mood and Affect: Mood normal.         Behavior: Behavior normal.         ASSESSMENT/PLAN:   Mr. Joya is a 64 y.o. male with a history of multiple myeloma who presents for hemosplit catheter placement for stem cell transplantation. After discussing the alternatives, benefits, and risks for the proposed operation, Mr. Joya wished to proceed. All of Mr. Joya questions concerning the operation were answered, he expressed full understanding of the procedure and the risks/benefits associated, and signed informed consent was obtained.    - OR for hemosplit catheter placement, right vs left  - Surgical consent obtained      Candido Torres MD  General Surgery PGY-1  3/28/2025         [1]   Current Facility-Administered Medications   Medication Dose Route Frequency Provider Last Rate Last Admin    0.9% NaCl infusion   Intravenous Continuous Lux Mallory PA-C        ceFAZolin 2 g  2 g Intravenous On Call Procedure Lux Mallory PA-C        LIDOcaine (PF) 10 mg/ml (1%) 10 mg/mL (1 %) injection            [2]   Social History  Tobacco Use    Smoking status: Never    Smokeless tobacco: Never   Substance Use Topics    Alcohol use: Yes     Comment: occ    Drug use: Never

## 2025-03-28 NOTE — TRANSFER OF CARE
"Anesthesia Transfer of Care Note    Patient: Mitchel Joya    Procedure(s) Performed: Procedure(s) (LRB):  INSERTION-CATHETER-MONA: Double Lumen, Bard 14.5 Fr. Hemosplit Cath, Model# [id number], Right vs Left (Right)    Patient location: [facility]    Anesthesia Type: general    Transport from OR: Transported from OR on 6-10 L/min O2 by face mask with adequate spontaneous ventilation    Post pain: adequate analgesia    Post assessment: no apparent anesthetic complications and tolerated procedure well    Post vital signs: stable    Level of consciousness: sedated and responds to stimulation    Nausea/Vomiting: no nausea/vomiting    Complications: none    Transfer of care protocol was followed      Last vitals: Visit Vitals  BP (!) 146/87 (BP Location: Left arm, Patient Position: Lying)   Pulse 67   Temp 36.7 °C (98.1 °F) (Oral)   Resp 18   Ht 5' 9" (1.753 m)   Wt 98.9 kg (218 lb)   SpO2 98%   BMI 32.19 kg/m²     "

## 2025-03-28 NOTE — NURSING
Pt given SubQ Neupogen injection to left arm. Tolerated well. Care explained, all questions answered.

## 2025-03-28 NOTE — ANESTHESIA PREPROCEDURE EVALUATION
03/28/2025  Mitchel Joya is a 64 y.o., male presenting for : INSERTION-CATHETER-MONA: Double Lumen, Bard 14.5 Fr. Hemosplit Cath, Model# [id number], Right vs Left (Right: Chest)   Problem List[1]  Current Outpatient Medications   Medication Instructions    aspirin (ECOTRIN) 81 mg, Daily    b complex vitamins capsule 1 capsule, Daily    DULoxetine (CYMBALTA) 20 mg    HYDROcodone-acetaminophen (NORCO)  mg per tablet 1 tablet, Every 6 hours PRN    lenalidomide 25 mg Cap 1 capsule, Daily    levothyroxine (SYNTHROID) 88 mcg, Before breakfast    meloxicam (MOBIC) 15 MG tablet 1 tablet, Daily    multivitamin (THERAGRAN) per tablet 1 tablet, Daily    ondansetron (ZOFRAN-ODT) 8 mg, Every 6 hours PRN    pantoprazole (PROTONIX) 40 mg, Oral, Daily    pregabalin (LYRICA) 75 mg, Nightly    promethazine (PHENERGAN) 25 mg, 2 times daily PRN    rosuvastatin (CRESTOR) 20 mg, Nightly    sildenafiL (VIAGRA) 45 mg, Daily PRN    tiZANidine (ZANAFLEX) 4 mg, Nightly PRN    valACYclovir (VALTREX) 500 mg, Oral, Nightly    vitamin D (VITAMIN D3) 1,000 Units, Daily     Pertinent Cardiac Studies:  Transthoracic Echo Left Heart Catheterization   Results for orders placed during the hospital encounter of 03/10/25    Echo    Interpretation Summary    Left Ventricle: The left ventricle is normal in size. Normal wall thickness. There is concentric remodeling. There is normal systolic function with a visually estimated ejection fraction of 55 - 60%. Quantitated ejection fraction is 58%. Global longitudinal strain is -17.0%. Global longitudinal strain is normal. There is normal diastolic function.    Right Ventricle: The right ventricle is normal in size. Systolic function is normal.    Aortic Valve: There is mild aortic valve sclerosis. Mildly restricted motion.    Pulmonary Artery: The estimated pulmonary artery systolic pressure is 17  mmHg.    IVC/SVC: Normal venous pressure at 3 mmHg.   No results found for this or any previous visit.           Pre-op Assessment    I have reviewed the Patient Summary Reports.     I have reviewed the Nursing Notes. I have reviewed the NPO Status.      Review of Systems  Anesthesia Hx:  No problems with previous Anesthesia   History of prior surgery of interest to airway management or planning:  Previous anesthesia: General        Denies Family Hx of Anesthesia complications.    Denies Personal Hx of Anesthesia complications.                    Pulmonary:        Sleep Apnea     Obstructive Sleep Apnea (ANA).               Physical Exam  General: Well nourished, Cooperative, Alert and Oriented    Airway:  Mallampati: III   Mouth Opening: Normal  TM Distance: Normal  Tongue: Normal  Neck ROM: Normal ROM      Anesthesia Plan  Type of Anesthesia, risks & benefits discussed:    Anesthesia Type: Gen Natural Airway, Gen Supraglottic Airway  Intra-op Monitoring Plan: Standard ASA Monitors  Post Op Pain Control Plan: multimodal analgesia  Induction:  IV  Airway Plan: , Post-Induction  Informed Consent: Informed consent signed with the Patient and all parties understand the risks and agree with anesthesia plan.  All questions answered.   ASA Score: 3  Day of Surgery Review of History & Physical: H&P Update referred to the surgeon/provider.    Ready For Surgery From Anesthesia Perspective.     .           [1]   Patient Active Problem List  Diagnosis    ANA (obstructive sleep apnea)    LPRD (laryngopharyngeal reflux disease)    Chronic anticoagulation    Obesity (BMI 30.0-34.9)    Multiple myeloma not having achieved remission    Stem cell transplant candidate

## 2025-03-28 NOTE — ANESTHESIA PROCEDURE NOTES
Intubation    Date/Time: 3/28/2025 7:19 AM    Performed by: Karina Tello CRNA  Authorized by: Jud Mujica MD    Intubation:     Induction:  Intravenous    Intubated:  Postinduction    Mask Ventilation:  N/a    Attempts:  1    Attempted By:  CRNA    Difficult Airway Encountered?: No      Complications:  None    Airway Device:  Supraglottic airway/LMA    Airway Device Size:  5.0 (Air Q)    Style/Cuff Inflation:  Cuffed (inflated to minimal occlusive pressure)    Secured at:  The lips    Placement Verified By:  Capnometry    Complicating Factors:  Small mouth and obesity    Findings Post-Intubation:  BS equal bilateral and atraumatic/condition of teeth unchanged

## 2025-03-28 NOTE — BRIEF OP NOTE
[facility] - Surgery (2nd Fl)  Brief Operative Note    Surgery Date: 3/28/2025     Surgeons and Role:     * Miguel Lawson MD - Primary     * Candido Torres MD - Resident - Assisting    Pre-op Diagnosis:  Stem cell transplant candidate [Z76.82]    Post-op Diagnosis:  Post-Op Diagnosis Codes:     * Stem cell transplant candidate [Z76.82]    Procedure(s) (LRB):  INSERTION-CATHETER-MONA: Double Lumen, Bard 14.5 Fr. Hemosplit Cath, Model# [id number], Right vs Left (Right)    Anesthesia: Local MAC    Operative Findings: Right IJ perm-a-cath placed under ultrasonic and fluoroscopic guidance without complication. Both ports aspirated and flushed easily. Heparin locked.      Estimated Blood Loss: Minimal         Specimens:   Specimen (24h ago, onward)      None            * No specimens in log *        Discharge Note    OUTCOME: Patient tolerated treatment/procedure well without complication and is now ready for discharge.    DISPOSITION: Home or Self Care    FINAL DIAGNOSIS:  Stem cell transplant candidate    FOLLOWUP: With primary care provider    DISCHARGE INSTRUCTIONS:    Discharge Procedure Orders   Notify your health care provider if you experience any of the following:  temperature >100.4     Notify your health care provider if you experience any of the following:  persistent nausea and vomiting or diarrhea     Notify your health care provider if you experience any of the following:  severe uncontrolled pain     Notify your health care provider if you experience any of the following:  redness, tenderness, or signs of infection (pain, swelling, redness, odor or green/yellow discharge around incision site)     Notify your health care provider if you experience any of the following:  difficulty breathing or increased cough     Notify your health care provider if you experience any of the following:  severe persistent headache     Notify your health care provider if you experience any of the following:  worsening rash      Notify your health care provider if you experience any of the following:  persistent dizziness, light-headedness, or visual disturbances     Notify your health care provider if you experience any of the following:  increased confusion or weakness     Leave dressing on - Keep it clean, dry, and intact until clinic visit     Activity as tolerated     Candido Torres MD  General Surgery PGY-1

## 2025-03-28 NOTE — ANESTHESIA POSTPROCEDURE EVALUATION
Anesthesia Post Evaluation    Patient: Mitchel Joya    Procedure(s) Performed: Procedure(s) (LRB):  INSERTION-CATHETER-MONA: Double Lumen, Bard 14.5 Fr. Hemosplit Cath, Model# [id number], Right vs Left (Right)    Final Anesthesia Type: general      Patient location during evaluation: [facility]  Patient participation: Yes- Able to Participate  Level of consciousness: awake and alert  Post-procedure vital signs: reviewed and stable  Pain management: adequate  Airway patency: patent    PONV status at discharge: No PONV  Anesthetic complications: no      Cardiovascular status: hemodynamically stable  Respiratory status: unassisted and spontaneous ventilation  Hydration status: euvolemic  Follow-up not needed.              Vitals Value Taken Time   /88 03/28/25 09:16   Temp 36.7 °C (98.1 °F) 03/28/25 09:15   Pulse 66 03/28/25 09:16   Resp 20 03/28/25 09:16   SpO2 99 % 03/28/25 09:15   Vitals shown include unfiled device data.      No case tracking events are documented in the log.      Pain/Tommy Score: Pain Rating Prior to Med Admin: 3 (3/28/2025  9:11 AM)  Tommy Score: 10 (3/28/2025  9:00 AM)

## 2025-03-28 NOTE — DISCHARGE INSTRUCTIONS
Postoperative General Instructions     What to Expect:  It is normal to experience pain and swelling at the surgical site.  The pain usually decreases significantly after the first week but may last for many weeks.  Each day, the pain should be similar or better to the previous day. If it worsens, call the doctor's office.     Activity:  Resume normal activities as tolerated.     Wound Care:  You may shower one day after surgery. Let soap and water run over the incisions and pat dry. Do not submerge in a bathtub or pool for two weeks after surgery.     Diet:  You may resume your normal diet postoperatively.    Medication:  Take acetaminophen (Tylenol) 1000mg every 8 hours as needed for pain.  Take ibuprofen 600mg every 8 hours as needed for pain (alternate taking with Tylenol for best effect). Stop taking this medication if it causes an upset stomach.     Call Your Doctor's Office If You Experience:  Fevers greater than 101.3°F.  Vomiting.  Spreading redness or drainage from the incisions.  Opening of the incisions.  Worsening pain not controlled by medication.  Chest pain or shortness of breath.      Contact Information:  During normal business hours call the clinic with any questions or concerns. On weekends and evenings call [phone] to have the operators page the surgery resident on call after hours with questions or concerns.

## 2025-03-28 NOTE — PLAN OF CARE
Patient states they are ready to be discharged. Instructions given to patient and family. Both verbalize understanding. Patient tolerating po liquids with no difficulty. Patient states pain is at a tolerable level for them. Anesthesia consent and surgical consent in chart upon patient's discharge from [facility].

## 2025-03-29 ENCOUNTER — INFUSION (OUTPATIENT)
Dept: INFUSION THERAPY | Facility: HOSPITAL | Age: 65
End: 2025-03-29
Payer: COMMERCIAL

## 2025-03-29 VITALS
DIASTOLIC BLOOD PRESSURE: 78 MMHG | OXYGEN SATURATION: 99 % | TEMPERATURE: 98 F | HEART RATE: 91 BPM | SYSTOLIC BLOOD PRESSURE: 161 MMHG | RESPIRATION RATE: 18 BRPM

## 2025-03-29 DIAGNOSIS — Z76.82 STEM CELL TRANSPLANT CANDIDATE: Primary | ICD-10-CM

## 2025-03-29 DIAGNOSIS — C90.00 MULTIPLE MYELOMA NOT HAVING ACHIEVED REMISSION: ICD-10-CM

## 2025-03-29 PROCEDURE — 63600175 PHARM REV CODE 636 W HCPCS: Mod: JZ,TB

## 2025-03-29 PROCEDURE — 96372 THER/PROPH/DIAG INJ SC/IM: CPT

## 2025-03-29 RX ORDER — POTASSIUM CHLORIDE 20 MEQ/1
40 TABLET, EXTENDED RELEASE ORAL ONCE AS NEEDED
Status: DISCONTINUED | OUTPATIENT
Start: 2025-03-29 | End: 2025-03-29 | Stop reason: HOSPADM

## 2025-03-29 RX ORDER — LANOLIN ALCOHOL/MO/W.PET/CERES
800 CREAM (GRAM) TOPICAL ONCE AS NEEDED
Status: DISCONTINUED | OUTPATIENT
Start: 2025-03-29 | End: 2025-03-29 | Stop reason: HOSPADM

## 2025-03-29 RX ORDER — SODIUM,POTASSIUM PHOSPHATES 280-250MG
2 POWDER IN PACKET (EA) ORAL ONCE AS NEEDED
Status: DISCONTINUED | OUTPATIENT
Start: 2025-03-29 | End: 2025-03-29 | Stop reason: HOSPADM

## 2025-03-29 RX ORDER — DIPHENHYDRAMINE HCL 25 MG
25 CAPSULE ORAL ONCE AS NEEDED
Status: DISCONTINUED | OUTPATIENT
Start: 2025-03-29 | End: 2025-03-29 | Stop reason: HOSPADM

## 2025-03-29 RX ORDER — ACETAMINOPHEN 325 MG/1
650 TABLET ORAL ONCE AS NEEDED
Status: DISCONTINUED | OUTPATIENT
Start: 2025-03-29 | End: 2025-03-29 | Stop reason: HOSPADM

## 2025-03-29 RX ADMIN — FILGRASTIM 960 MCG: 480 INJECTION, SOLUTION INTRAVENOUS; SUBCUTANEOUS at 07:03

## 2025-03-29 NOTE — PLAN OF CARE
Problem: Infection  Goal: Absence of Infection Signs and Symptoms  Outcome: Progressing     Neupogen tolerated with no problems.  Ambulatory home.  NAD.

## 2025-03-30 ENCOUNTER — INFUSION (OUTPATIENT)
Dept: INFUSION THERAPY | Facility: HOSPITAL | Age: 65
End: 2025-03-30
Payer: COMMERCIAL

## 2025-03-30 VITALS
WEIGHT: 217.81 LBS | OXYGEN SATURATION: 100 % | SYSTOLIC BLOOD PRESSURE: 151 MMHG | TEMPERATURE: 98 F | BODY MASS INDEX: 32.26 KG/M2 | DIASTOLIC BLOOD PRESSURE: 80 MMHG | HEART RATE: 90 BPM | RESPIRATION RATE: 18 BRPM | HEIGHT: 69 IN

## 2025-03-30 DIAGNOSIS — C90.00 MULTIPLE MYELOMA NOT HAVING ACHIEVED REMISSION: ICD-10-CM

## 2025-03-30 DIAGNOSIS — Z76.82 STEM CELL TRANSPLANT CANDIDATE: Primary | ICD-10-CM

## 2025-03-30 PROCEDURE — 63600175 PHARM REV CODE 636 W HCPCS: Mod: JZ,TB

## 2025-03-30 PROCEDURE — 96372 THER/PROPH/DIAG INJ SC/IM: CPT

## 2025-03-30 RX ORDER — ACETAMINOPHEN 325 MG/1
650 TABLET ORAL ONCE AS NEEDED
Status: DISCONTINUED | OUTPATIENT
Start: 2025-03-30 | End: 2025-03-30 | Stop reason: HOSPADM

## 2025-03-30 RX ORDER — LANOLIN ALCOHOL/MO/W.PET/CERES
800 CREAM (GRAM) TOPICAL ONCE AS NEEDED
Status: DISCONTINUED | OUTPATIENT
Start: 2025-03-30 | End: 2025-03-30 | Stop reason: HOSPADM

## 2025-03-30 RX ORDER — DIPHENHYDRAMINE HCL 25 MG
25 CAPSULE ORAL ONCE AS NEEDED
Status: DISCONTINUED | OUTPATIENT
Start: 2025-03-30 | End: 2025-03-30 | Stop reason: HOSPADM

## 2025-03-30 RX ORDER — POTASSIUM CHLORIDE 20 MEQ/1
40 TABLET, EXTENDED RELEASE ORAL ONCE AS NEEDED
Status: DISCONTINUED | OUTPATIENT
Start: 2025-03-30 | End: 2025-03-30 | Stop reason: HOSPADM

## 2025-03-30 RX ORDER — SODIUM,POTASSIUM PHOSPHATES 280-250MG
2 POWDER IN PACKET (EA) ORAL ONCE AS NEEDED
Status: DISCONTINUED | OUTPATIENT
Start: 2025-03-30 | End: 2025-03-30 | Stop reason: HOSPADM

## 2025-03-30 RX ADMIN — FILGRASTIM 960 MCG: 480 INJECTION, SOLUTION INTRAVENOUS; SUBCUTANEOUS at 07:03

## 2025-03-30 NOTE — PLAN OF CARE
Pt received Neupogen SQ today and tolerated well, without complications. Educated patient about Neupogen SQ (indications, side effects, possible reactions, precautions) and verbalized understanding. Neupogen SQ inj administered SQ to L arm, tolerated well, dsg to site. VSS. Pt DC with no distress noted, ambulated off of unit escorted by fx, w/o event, pleased.

## 2025-03-31 ENCOUNTER — INFUSION (OUTPATIENT)
Dept: INFUSION THERAPY | Facility: HOSPITAL | Age: 65
End: 2025-03-31
Payer: COMMERCIAL

## 2025-03-31 ENCOUNTER — HOSPITAL ENCOUNTER (OUTPATIENT)
Dept: RADIOLOGY | Facility: HOSPITAL | Age: 65
Discharge: HOME | End: 2025-03-31
Payer: COMMERCIAL

## 2025-03-31 VITALS
WEIGHT: 217.81 LBS | SYSTOLIC BLOOD PRESSURE: 154 MMHG | RESPIRATION RATE: 20 BRPM | HEIGHT: 69 IN | BODY MASS INDEX: 32.26 KG/M2 | HEART RATE: 98 BPM | TEMPERATURE: 98 F | DIASTOLIC BLOOD PRESSURE: 85 MMHG

## 2025-03-31 VITALS — HEART RATE: 102 BPM | DIASTOLIC BLOOD PRESSURE: 86 MMHG | SYSTOLIC BLOOD PRESSURE: 139 MMHG

## 2025-03-31 DIAGNOSIS — Z76.82 STEM CELL TRANSPLANT CANDIDATE: ICD-10-CM

## 2025-03-31 DIAGNOSIS — Z76.82 STEM CELL TRANSPLANT CANDIDATE: Primary | ICD-10-CM

## 2025-03-31 DIAGNOSIS — C90.01 MULTIPLE MYELOMA IN REMISSION: ICD-10-CM

## 2025-03-31 DIAGNOSIS — C90.00 MULTIPLE MYELOMA NOT HAVING ACHIEVED REMISSION: ICD-10-CM

## 2025-03-31 LAB
ABSOLUTE NEUTROPHIL MANUAL (OHS): 32.7 K/UL
ALBUMIN SERPL BCP-MCNC: 3.8 G/DL (ref 3.5–5.2)
ALP SERPL-CCNC: 231 UNIT/L (ref 40–150)
ALT SERPL W/O P-5'-P-CCNC: 28 UNIT/L (ref 10–44)
ANION GAP (OHS): 10 MMOL/L (ref 8–16)
APTT PPP: 26.7 SECONDS (ref 21–32)
AST SERPL-CCNC: 21 UNIT/L (ref 11–45)
BILIRUB SERPL-MCNC: 0.3 MG/DL (ref 0.1–1)
BUN SERPL-MCNC: 12 MG/DL (ref 8–23)
CA-I BLD-SCNC: 1.23 MMOL/L (ref 1.06–1.42)
CALCIUM SERPL-MCNC: 10.1 MG/DL (ref 8.7–10.5)
CHLORIDE SERPL-SCNC: 105 MMOL/L (ref 95–110)
CO2 SERPL-SCNC: 25 MMOL/L (ref 23–29)
CREAT SERPL-MCNC: 0.7 MG/DL (ref 0.5–1.4)
EOSINOPHIL NFR BLD MANUAL: 1 % (ref 0–8)
ERYTHROCYTE [DISTWIDTH] IN BLOOD BY AUTOMATED COUNT: 18.3 % (ref 11.5–14.5)
GFR SERPLBLD CREATININE-BSD FMLA CKD-EPI: >60 ML/MIN/1.73/M2
GLUCOSE SERPL-MCNC: 73 MG/DL (ref 70–110)
HCT VFR BLD AUTO: 40.6 % (ref 40–54)
HGB BLD-MCNC: 13.3 GM/DL (ref 14–18)
INDIRECT COOMBS: NORMAL
INR PPP: 1 (ref 0.8–1.2)
LABORATORY COMMENT REPORT: NORMAL
LYMPHOCYTES NFR BLD MANUAL: 3 % (ref 18–48)
Lab: 0.03 %
Lab: 11 CELLS/UL
Lab: 99.16 %
Lab: 99.36 %
MAGNESIUM SERPL-MCNC: 1.9 MG/DL (ref 1.6–2.6)
MCH RBC QN AUTO: 30 PG (ref 27–31)
MCHC RBC AUTO-ENTMCNC: 32.8 G/DL (ref 32–36)
MCV RBC AUTO: 91 FL (ref 82–98)
MONOCYTES NFR BLD MANUAL: 5 % (ref 4–15)
NEUTROPHILS NFR BLD MANUAL: 88 % (ref 38–73)
NEUTS BAND NFR BLD MANUAL: 3 %
NUCLEATED RBC (/100WBC) (OHS): 0 /100 WBC
PHOSPHATE SERPL-MCNC: 3.6 MG/DL (ref 2.7–4.5)
PLATELET # BLD AUTO: 178 K/UL (ref 150–450)
PMV BLD AUTO: 11.4 FL (ref 9.2–12.9)
POTASSIUM SERPL-SCNC: 3.9 MMOL/L (ref 3.5–5.1)
PROT SERPL-MCNC: 6.9 GM/DL (ref 6–8.4)
PROTHROMBIN TIME: 11.4 SECONDS (ref 9–12.5)
RBC # BLD AUTO: 4.44 M/UL (ref 4.6–6.2)
RH BLD: NORMAL
SODIUM SERPL-SCNC: 140 MMOL/L (ref 136–145)
SPECIMEN OUTDATE: NORMAL
WBC # BLD AUTO: 35.92 K/UL (ref 3.9–12.7)

## 2025-03-31 PROCEDURE — 85730 THROMBOPLASTIN TIME PARTIAL: CPT

## 2025-03-31 PROCEDURE — 85610 PROTHROMBIN TIME: CPT

## 2025-03-31 PROCEDURE — 82435 ASSAY OF BLOOD CHLORIDE: CPT

## 2025-03-31 PROCEDURE — 96372 THER/PROPH/DIAG INJ SC/IM: CPT

## 2025-03-31 PROCEDURE — 85025 COMPLETE CBC W/AUTO DIFF WBC: CPT

## 2025-03-31 PROCEDURE — 36591 DRAW BLOOD OFF VENOUS DEVICE: CPT

## 2025-03-31 PROCEDURE — 83735 ASSAY OF MAGNESIUM: CPT

## 2025-03-31 PROCEDURE — 36415 COLL VENOUS BLD VENIPUNCTURE: CPT

## 2025-03-31 PROCEDURE — 86367 STEM CELLS TOTAL COUNT: CPT

## 2025-03-31 PROCEDURE — 63600175 PHARM REV CODE 636 W HCPCS: Mod: JZ,TB

## 2025-03-31 PROCEDURE — 82330 ASSAY OF CALCIUM: CPT

## 2025-03-31 PROCEDURE — 86901 BLOOD TYPING SEROLOGIC RH(D): CPT | Performed by: INTERNAL MEDICINE

## 2025-03-31 PROCEDURE — 76536 US EXAM OF HEAD AND NECK: CPT | Mod: TC

## 2025-03-31 PROCEDURE — 84100 ASSAY OF PHOSPHORUS: CPT

## 2025-03-31 RX ORDER — LANOLIN ALCOHOL/MO/W.PET/CERES
800 CREAM (GRAM) TOPICAL ONCE AS NEEDED
Status: DISCONTINUED | OUTPATIENT
Start: 2025-03-31 | End: 2025-03-31 | Stop reason: HOSPADM

## 2025-03-31 RX ORDER — DIPHENHYDRAMINE HCL 25 MG
25 CAPSULE ORAL ONCE AS NEEDED
Status: DISCONTINUED | OUTPATIENT
Start: 2025-03-31 | End: 2025-03-31 | Stop reason: HOSPADM

## 2025-03-31 RX ORDER — POTASSIUM CHLORIDE 20 MEQ/1
40 TABLET, EXTENDED RELEASE ORAL ONCE AS NEEDED
Status: DISCONTINUED | OUTPATIENT
Start: 2025-03-31 | End: 2025-03-31 | Stop reason: HOSPADM

## 2025-03-31 RX ORDER — ACETAMINOPHEN 325 MG/1
650 TABLET ORAL ONCE AS NEEDED
Status: DISCONTINUED | OUTPATIENT
Start: 2025-03-31 | End: 2025-03-31 | Stop reason: HOSPADM

## 2025-03-31 RX ORDER — SODIUM,POTASSIUM PHOSPHATES 280-250MG
2 POWDER IN PACKET (EA) ORAL ONCE AS NEEDED
Status: DISCONTINUED | OUTPATIENT
Start: 2025-03-31 | End: 2025-03-31 | Stop reason: HOSPADM

## 2025-03-31 RX ORDER — PLERIXAFOR 20 MG/ML
0.24 INJECTION, SOLUTION SUBCUTANEOUS ONCE AS NEEDED
Status: DISCONTINUED | OUTPATIENT
Start: 2025-03-31 | End: 2025-03-31 | Stop reason: HOSPADM

## 2025-03-31 RX ORDER — PLERIXAFOR 20 MG/ML
0.24 INJECTION, SOLUTION SUBCUTANEOUS ONCE AS NEEDED
Status: COMPLETED | OUTPATIENT
Start: 2025-03-31 | End: 2025-03-31

## 2025-03-31 RX ADMIN — FILGRASTIM 960 MCG: 480 INJECTION, SOLUTION INTRAVENOUS; SUBCUTANEOUS at 08:03

## 2025-03-31 RX ADMIN — PLERIXAFOR 24 MG: 24 SOLUTION SUBCUTANEOUS at 05:03

## 2025-03-31 NOTE — PLAN OF CARE
0823 - Pt tolerated Neupogen injection well today, no complaints or complications. SQ injection given in right arm without issue. VSS. Pt aware to call provider with any questions or concerns and is aware of upcoming appts. Pt ambulatory from clinic with steady gait, no distress noted.

## 2025-03-31 NOTE — PLAN OF CARE
1355 - Positive blood return noted from both lumens of central line; Lab samples drawn; both lumens flushed, and heparin locked. Pt aware to call provider with any questions or concerns, and is aware of upcoming appts. Pt ambulatory from clinic independently with steady gait, no distress noted.

## 2025-03-31 NOTE — PLAN OF CARE
1742--Pt tolerated Mozobil injection well. No complaints or complications reported. Pt aware to call provider with any questions or  concerns, aware of upcoming appointments, ambulatory from clinic with steady gait, no distress noted.

## 2025-04-01 ENCOUNTER — RESULTS FOLLOW-UP (OUTPATIENT)
Dept: HEMATOLOGY/ONCOLOGY | Facility: CLINIC | Age: 65
End: 2025-04-01

## 2025-04-01 ENCOUNTER — TELEPHONE (OUTPATIENT)
Dept: HEMATOLOGY/ONCOLOGY | Facility: CLINIC | Age: 65
End: 2025-04-01
Payer: COMMERCIAL

## 2025-04-01 ENCOUNTER — HOSPITAL ENCOUNTER (OUTPATIENT)
Dept: TRANSFUSION MEDICINE | Facility: HOSPITAL | Age: 65
Discharge: HOME | End: 2025-04-01
Payer: COMMERCIAL

## 2025-04-01 VITALS
OXYGEN SATURATION: 97 % | SYSTOLIC BLOOD PRESSURE: 138 MMHG | HEIGHT: 69 IN | DIASTOLIC BLOOD PRESSURE: 86 MMHG | BODY MASS INDEX: 32.26 KG/M2 | RESPIRATION RATE: 20 BRPM | HEART RATE: 88 BPM | WEIGHT: 217.81 LBS | TEMPERATURE: 98 F

## 2025-04-01 DIAGNOSIS — Z76.82 STEM CELL TRANSPLANT CANDIDATE: Primary | ICD-10-CM

## 2025-04-01 DIAGNOSIS — C90.00 MULTIPLE MYELOMA NOT HAVING ACHIEVED REMISSION: ICD-10-CM

## 2025-04-01 LAB
ABSOLUTE NEUTROPHIL MANUAL (OHS): 47.4 K/UL
ALBUMIN SERPL BCP-MCNC: 2.8 G/DL (ref 3.5–5.2)
ALBUMIN SERPL BCP-MCNC: 3.4 G/DL (ref 3.5–5.2)
ALP SERPL-CCNC: 248 UNIT/L (ref 40–150)
ALP SERPL-CCNC: 383 UNIT/L (ref 40–150)
ALT SERPL W/O P-5'-P-CCNC: 30 UNIT/L (ref 10–44)
ALT SERPL W/O P-5'-P-CCNC: 35 UNIT/L (ref 10–44)
ANION GAP (OHS): 10 MMOL/L (ref 8–16)
ANION GAP (OHS): 11 MMOL/L (ref 8–16)
ANISOCYTOSIS BLD QL SMEAR: SLIGHT
AST SERPL-CCNC: 26 UNIT/L (ref 11–45)
AST SERPL-CCNC: 30 UNIT/L (ref 11–45)
BILIRUB SERPL-MCNC: 0.2 MG/DL (ref 0.1–1)
BILIRUB SERPL-MCNC: 0.3 MG/DL (ref 0.1–1)
BUN SERPL-MCNC: 11 MG/DL (ref 8–23)
BUN SERPL-MCNC: 8 MG/DL (ref 8–23)
CA-I BLD-SCNC: 1.07 MMOL/L (ref 1.06–1.42)
CA-I BLD-SCNC: 1.22 MMOL/L (ref 1.06–1.42)
CALCIUM SERPL-MCNC: 9.5 MG/DL (ref 8.7–10.5)
CALCIUM SERPL-MCNC: 9.6 MG/DL (ref 8.7–10.5)
CHLORIDE SERPL-SCNC: 101 MMOL/L (ref 95–110)
CHLORIDE SERPL-SCNC: 105 MMOL/L (ref 95–110)
CO2 SERPL-SCNC: 23 MMOL/L (ref 23–29)
CO2 SERPL-SCNC: 31 MMOL/L (ref 23–29)
CREAT SERPL-MCNC: 0.6 MG/DL (ref 0.5–1.4)
CREAT SERPL-MCNC: 0.8 MG/DL (ref 0.5–1.4)
DOHLE BOD BLD QL SMEAR: PRESENT
EOSINOPHIL NFR BLD MANUAL: 3 % (ref 0–8)
ERYTHROCYTE [DISTWIDTH] IN BLOOD BY AUTOMATED COUNT: 18.6 % (ref 11.5–14.5)
GFR SERPLBLD CREATININE-BSD FMLA CKD-EPI: >60 ML/MIN/1.73/M2
GFR SERPLBLD CREATININE-BSD FMLA CKD-EPI: >60 ML/MIN/1.73/M2
GLUCOSE SERPL-MCNC: 159 MG/DL (ref 70–110)
GLUCOSE SERPL-MCNC: 232 MG/DL (ref 70–110)
HCT VFR BLD AUTO: 37.2 % (ref 40–54)
HGB BLD-MCNC: 12.1 GM/DL (ref 14–18)
LYMPHOCYTES NFR BLD MANUAL: 11 % (ref 18–48)
MAGNESIUM SERPL-MCNC: 1.5 MG/DL (ref 1.6–2.6)
MAGNESIUM SERPL-MCNC: 1.7 MG/DL (ref 1.6–2.6)
MCH RBC QN AUTO: 30.1 PG (ref 27–31)
MCHC RBC AUTO-ENTMCNC: 32.5 G/DL (ref 32–36)
MCV RBC AUTO: 93 FL (ref 82–98)
METAMYELOCYTES NFR BLD MANUAL: 3 %
MONOCYTES NFR BLD MANUAL: 4 % (ref 4–15)
MYELOCYTES NFR BLD MANUAL: 1 %
NEUTROPHILS NFR BLD MANUAL: 76 % (ref 38–73)
NEUTS BAND NFR BLD MANUAL: 2 %
NUCLEATED RBC (/100WBC) (OHS): 0 /100 WBC
OVALOCYTES BLD QL SMEAR: ABNORMAL
PHOSPHATE SERPL-MCNC: 2.2 MG/DL (ref 2.7–4.5)
PHOSPHATE SERPL-MCNC: 2.9 MG/DL (ref 2.7–4.5)
PLATELET # BLD AUTO: 153 K/UL (ref 150–450)
PLATELET BLD QL SMEAR: ABNORMAL
PMV BLD AUTO: 11.3 FL (ref 9.2–12.9)
POIKILOCYTOSIS BLD QL SMEAR: SLIGHT
POTASSIUM SERPL-SCNC: 3 MMOL/L (ref 3.5–5.1)
POTASSIUM SERPL-SCNC: 3.6 MMOL/L (ref 3.5–5.1)
PROT SERPL-MCNC: 5.1 GM/DL (ref 6–8.4)
PROT SERPL-MCNC: 6.2 GM/DL (ref 6–8.4)
RBC # BLD AUTO: 4.02 M/UL (ref 4.6–6.2)
SODIUM SERPL-SCNC: 139 MMOL/L (ref 136–145)
SODIUM SERPL-SCNC: 142 MMOL/L (ref 136–145)
TOXIC GRANULES BLD QL SMEAR: PRESENT
WBC # BLD AUTO: 60.8 K/UL (ref 3.9–12.7)

## 2025-04-01 PROCEDURE — 63600175 PHARM REV CODE 636 W HCPCS: Mod: JZ,TB

## 2025-04-01 PROCEDURE — 36415 COLL VENOUS BLD VENIPUNCTURE: CPT

## 2025-04-01 PROCEDURE — 38206 HARVEST AUTO STEM CELLS: CPT

## 2025-04-01 PROCEDURE — 82330 ASSAY OF CALCIUM: CPT | Mod: 91

## 2025-04-01 PROCEDURE — 83735 ASSAY OF MAGNESIUM: CPT

## 2025-04-01 PROCEDURE — 38214 VOLUME DEPLETE OF HARVEST: CPT

## 2025-04-01 PROCEDURE — 84100 ASSAY OF PHOSPHORUS: CPT | Mod: 91

## 2025-04-01 PROCEDURE — 25000003 PHARM REV CODE 250: Performed by: PATHOLOGY

## 2025-04-01 PROCEDURE — 25000003 PHARM REV CODE 250

## 2025-04-01 PROCEDURE — 84075 ASSAY ALKALINE PHOSPHATASE: CPT

## 2025-04-01 PROCEDURE — 85025 COMPLETE CBC W/AUTO DIFF WBC: CPT

## 2025-04-01 PROCEDURE — 63600175 PHARM REV CODE 636 W HCPCS: Performed by: PATHOLOGY

## 2025-04-01 PROCEDURE — 38207 CRYOPRESERVE STEM CELLS: CPT

## 2025-04-01 RX ORDER — SODIUM,POTASSIUM PHOSPHATES 280-250MG
2 POWDER IN PACKET (EA) ORAL ONCE AS NEEDED
Status: DISCONTINUED | OUTPATIENT
Start: 2025-04-01 | End: 2025-04-02 | Stop reason: HOSPADM

## 2025-04-01 RX ORDER — DIPHENHYDRAMINE HCL 25 MG
25 CAPSULE ORAL ONCE AS NEEDED
Status: DISCONTINUED | OUTPATIENT
Start: 2025-04-01 | End: 2025-04-02 | Stop reason: HOSPADM

## 2025-04-01 RX ORDER — LANOLIN ALCOHOL/MO/W.PET/CERES
800 CREAM (GRAM) TOPICAL ONCE AS NEEDED
Status: DISCONTINUED | OUTPATIENT
Start: 2025-04-01 | End: 2025-04-02 | Stop reason: HOSPADM

## 2025-04-01 RX ORDER — ACETAMINOPHEN 325 MG/1
650 TABLET ORAL ONCE AS NEEDED
Status: DISCONTINUED | OUTPATIENT
Start: 2025-04-01 | End: 2025-04-02 | Stop reason: HOSPADM

## 2025-04-01 RX ORDER — HEPARIN SODIUM 1000 [USP'U]/ML
4000 INJECTION, SOLUTION INTRAVENOUS; SUBCUTANEOUS ONCE
Status: COMPLETED | OUTPATIENT
Start: 2025-04-01 | End: 2025-04-01

## 2025-04-01 RX ORDER — POTASSIUM CHLORIDE 20 MEQ/1
40 TABLET, EXTENDED RELEASE ORAL ONCE AS NEEDED
Status: DISCONTINUED | OUTPATIENT
Start: 2025-04-01 | End: 2025-04-02 | Stop reason: HOSPADM

## 2025-04-01 RX ADMIN — Medication 800 MG: at 03:04

## 2025-04-01 RX ADMIN — POTASSIUM CHLORIDE 40 MEQ: 1500 TABLET, EXTENDED RELEASE ORAL at 03:04

## 2025-04-01 RX ADMIN — SODIUM CHLORIDE 4 G: 9 INJECTION, SOLUTION INTRAVENOUS at 07:04

## 2025-04-01 RX ADMIN — FILGRASTIM 960 MCG: 480 INJECTION, SOLUTION INTRAVENOUS; SUBCUTANEOUS at 08:04

## 2025-04-01 RX ADMIN — HEPARIN SODIUM 4000 UNITS: 1000 INJECTION, SOLUTION INTRAVENOUS; SUBCUTANEOUS at 02:04

## 2025-04-01 RX ADMIN — POTASSIUM & SODIUM PHOSPHATES POWDER PACK 280-160-250 MG 2 PACKET: 280-160-250 PACK at 09:04

## 2025-04-01 NOTE — OP NOTE
[facility]  General Surgery  Operative Note    DATE: 3/28/2025    PREOPERATIVE DIAGNOSIS: Stem cell transplant candidate [Z76.82]   Need for central venous access    POSTOPERATIVE DIAGNOSIS: Stem cell transplant candidate [Z76.82]   Need for central venous access    Procedure(s):  INSERTION-CATHETER-MONA: Double Lumen, Bard 14.5 Fr. Hemosplit Cath, Model# [id number], Right vs Left   Intraoperative fluoroscopic guidance of CVL placement     Surgeons and Role:     * Miguel Lawson MD - Primary     * Candido Torres MD - Resident - Assisting    ANESTHESIA: Monitored anesthesia care with local anesthetic    FINDINGS: 14.5fr hemosplit HD catheter placed under fluoroscopic guidance without issue    INDICATION: Mr. Joya is a 64 y.o. male who was referred to General Surgery for tunneled high-flow CVL catheter placement for management of Stem cell transplant candidate for multiple myeloma. After discussing the alternatives, benefits, and risks for the proposed operation, Mr. Joya wished to proceed. All of Mr. Joya questions concerning the operation were answered, he expressed full understanding of the procedure and the risks/benefits associated, and signed informed consent was obtained.    PROCEDURE IN DETAIL: Patient was brought to the operating room where he was placed in supine position on the operating room table and underwent general anesthesia with endotracheal intubation without complication. The field was sterilely prepped out and draped in standard fashion, and a timeout identifying the correct patient, placement, procedure, and preoperative antibiotics was called with everyone in agreement.  After administering local anesthesia, the right internal jugular vein was cannulated with a hollow bore needle on the first attempt using ultrasound guidance. A guidewire was placed and confirmed to be in correct position using fluoroscopy. A small skin incision was made around the guidewire. An  appropriately sized catheter was chosen. A counter incision inferolateral on the right chest was made after administering additional lidocaine. A subcutaneous tunnel between the lower chest incision and the cannulation site was made with a hemostat after administration of additional local anesthesia. The catheter was loaded onto the tunneling device and tunneled from the lower chest incision to the upper one. Under fluoroscopic guidance, the split sheath and dilator were placed over the guidewire, and the guidewire and dilator were then removed. The catheter was placed down the split sheath and the sheath was split and peeled away. Fluoroscopy confirmed appropriate position of the catheter, which aspirated and flushed easily. Heparinized saline was instilled in the catheter. The catheter was secured at it's exit site using 2-0 Prolene suture. The skin incision at the cannulation site was closed with subcuticular 4-0 Monocryl. A sterile dressing was applied.   This completed the proposed operation. All instruments, needles, and sponge counts were reported as correct x2 by the nursing staff. Patient was extubated and awakened from general anesthesia without complication. He was sent to the recovery unit in stable condition.     Catheter cleared for use after CXR obtained in recovery.     EBL: 10mL    COMPLICATIONS: none apparent.    SPECIMEN: none    DRAINS: 14.5fr hemosplit HD catheter     [facility]    ATTESTATION:  I was present and scrubbed for the entire procedure including all critical portions of the procedure.    Miguel Lawson MD, FACS  Acute Care Surgery and Surgical Critical Care  [facility]  3/28/2025

## 2025-04-01 NOTE — PROGRESS NOTES
Hematopoietic Stem Cell Collection day 1     Patient ambulated independently no resp distress and denies pain. Right subclavian dialysis access dressing clean dry and intact, accessed for prelab collection and labs tubed. 0740 collection started and vitals stable has baseline hypertension denies being on blood pressure medication. No complications, no resp distress and no expression of pain. 0920 Dr Rochelle shah talking with patient. Dr. Baird wants the mid stem sample to be collected at 3 hours from interface, 1110.     Medications/Replacement Fluids:  4 grams Calcium Gluconate IVPB  Neupogen 960 mcg subq  800 mg magnesium by mouth  40 mEq Potassium  Chloride by mouth  2 packets Na Phos  3100 units Heparin intra-catheter lock     0940 Prelabs resulted  replacements needed given per standing orders. 1110 mid sample collection obtained from stem cell collection bag and walked by  KENDRA Garcia with apheresis to the flow cytometry, collect volume 207ml.  Today the collection will be completed, determined by Dr. Baird.    1405 Collection of stem cells to bag ended, products removed from machine and rinse back started 1407. 1420collection and rinse back completed. TBV processed 4.1. Vitals stable and denies pain. Post labs collected and tubed to lab. Post lab electrolytes replaced per standing orders. 1539 Patient aware of series collection ending today and ambulated independently off the unit with his spouse. No resp distress and vitals were at baseline.

## 2025-04-01 NOTE — PROCEDURES
[facility] - Apheresis  Transfusion Medicine  Procedure Note    SUMMARY   Stem Cell Collection Autologous    Date/Time: 4/1/2025 7:30 AM    Performed by: Rachel Byrd MD  Authorized by: Jason Baird MD        Date of Procedure: 4/1/2025     Procedure: Hematopoietic Progenitor Cell Collection    Provider: Rachel Byrd MD     Assisting Provider: None    Pre-Procedure Diagnosis: Multiple Myeloma     Post-Procedure Diagnosis: Multiple Myeloma     Follow-up Assessment: Mitchel Joya is a 64 y.o. year old male with Multiple Myeloma, who presents for hematopoietic stem cell collection following G-CSF and plerixafor (Mozobil) mobilization. A mid run count of 4.23 x 10^6 CD34 cells/kg guided the collection to end at 7 hours, which suggests we have exceeded the collection goal of 5-10 x 10^6 CD34 cells/kg. Today's procedure was well tolerated and without complications. Final counts below.  No additional collection days are planned.       Pertinent Laboratory Data:   Complete Blood Count:    Latest Reference Range & Units 04/01/25 07:40 04/01/25 14:17   WBC 3.90 - 12.70 K/uL 60.80 (HH) 36.75 (H)   RBC 4.60 - 6.20 M/uL 4.02 (L) 3.72 (L)   Hemoglobin 14.0 - 18.0 gm/dL 12.1 (L) 11.2 (L)   Hematocrit 40.0 - 54.0 % 37.2 (L) 33.9 (L)   MCV 82 - 98 fL 93 91   MCH 27.0 - 31.0 pg 30.1 30.1   MCHC 32.0 - 36.0 g/dL 32.5 33.0   RDW 11.5 - 14.5 % 18.6 (H) 18.9 (H)   Platelet Count 150 - 450 K/uL 153 75 (L)   (HH): Data is critically high  (H): Data is abnormally high  (L): Data is abnormally low    Comprehensive Metabolic Panel:    Latest Reference Range & Units 04/01/25 07:40 04/01/25 14:17   Sodium 136 - 145 mmol/L 139 142   Potassium 3.5 - 5.1 mmol/L 3.6 3.0 (L)   Chloride 95 - 110 mmol/L 105 101   CO2 23 - 29 mmol/L 23 31 (H)   Anion Gap 8 - 16 mmol/L 11 10   BUN 8 - 23 mg/dL 11 8   Creatinine 0.5 - 1.4 mg/dL 0.8 0.6   eGFR >60 mL/min/1.73/m2 >60 >60   Glucose 70 - 110 mg/dL 232 (H) 159 (H)   Calcium 8.7 - 10.5 mg/dL 9.5  9.6   Calcium Level Ionized 1.06 - 1.42 mmol/L 1.22 1.07   Phosphorus Level 2.7 - 4.5 mg/dL 2.2 (L) 2.9   Magnesium  1.6 - 2.6 mg/dL 1.7 1.5 (L)   ALP 40 - 150 unit/L 383 (H) 248 (H)   PROTEIN TOTAL 6.0 - 8.4 gm/dL 6.2 5.1 (L)   Albumin 3.5 - 5.2 g/dL 3.4 (L) 2.8 (L)   BILIRUBIN TOTAL 0.1 - 1.0 mg/dL 0.3 0.2   AST 11 - 45 unit/L 30 26   ALT 10 - 44 unit/L 35 30   (L): Data is abnormally low  (H): Data is abnormally high    CD34 - quantitative:   Lab Results   Component Value Date    CD34 0.03 03/31/2025    [id number] 11 03/31/2025    [id number] 99.36 03/31/2025    [id number] 99.16 03/31/2025       Pertinent Medications: None contraindicated in stem cell collection    Review of patient's allergies indicates:  No Known Allergies    Anesthesia: None     Technical Procedures Used: Hematopoietic Progenitor Cell collection: The patient presented to the 5th floor Chemotherapy unit, details about the procedure reviewed. Any interim clinical changes from previous clinic visit - No. All pertinent labs reviewed. Human Progenitor (Stem) Cell Collection initiated by Apheresis Nurse. Current plan is to perform the procedure for 7 hours. Initial laboratory tests collected, results to Oncology Nurse. Mid-run laboratory tests collected, results to Oncology Nurse. Included CD34 count on collection bag - results to Stem Cell Lab and BMT clinical team. Final laboratory tests collected, results to Oncology Nurse. Red blood cell or platelet transfusion - No.  Date of next procedure - None. Collections completed.     Post-Collection counts are as follows for GORGE Joya:     TNC 15.01 x 108/kg   CD34 7.03 x 106/kg   HCT 3%    Target cell dose: 5-10x 106/kg CD34      Description of the Findings of the Procedure:     Please see Apheresis Nurse flowsheet for details.    The patient was evaluated and all clinical and laboratory data relevant to the treatment was reviewed, and a decision was made to proceed with the Apheresis  procedure.    I was available to the clinical staff throughout the procedure.    Significant Surgical Tasks Conducted by the Assistant(s): Not applicable    Complications: None    Estimated Blood Loss (EBL): None    Implants: None     Specimens: None

## 2025-04-02 LAB
ABSOLUTE NEUTROPHIL MANUAL (OHS): 26.8 K/UL
AUER BODIES BLD QL SMEAR: PRESENT
DOHLE BOD BLD QL SMEAR: PRESENT
EOSINOPHIL NFR BLD MANUAL: 2 % (ref 0–8)
ERYTHROCYTE [DISTWIDTH] IN BLOOD BY AUTOMATED COUNT: 18.9 % (ref 11.5–14.5)
HCT VFR BLD AUTO: 33.9 % (ref 40–54)
HGB BLD-MCNC: 11.2 GM/DL (ref 14–18)
LARGE/GIANT PLATELETS (OHS): PRESENT
LYMPHOCYTES NFR BLD MANUAL: 10 % (ref 18–48)
MCH RBC QN AUTO: 30.1 PG (ref 27–31)
MCHC RBC AUTO-ENTMCNC: 33 G/DL (ref 32–36)
MCV RBC AUTO: 91 FL (ref 82–98)
METAMYELOCYTES NFR BLD MANUAL: 4 %
MONOCYTES NFR BLD MANUAL: 6 % (ref 4–15)
MYELOCYTES NFR BLD MANUAL: 2 %
NEUTROPHILS NFR BLD MANUAL: 63 % (ref 38–73)
NEUTS BAND NFR BLD MANUAL: 10 %
NUCLEATED RBC (/100WBC) (OHS): 0 /100 WBC
OVALOCYTES BLD QL SMEAR: ABNORMAL
PLATELET # BLD AUTO: 75 K/UL (ref 150–450)
PLATELET BLD QL SMEAR: ABNORMAL
PMV BLD AUTO: 10.7 FL (ref 9.2–12.9)
PROMYELOCYTES NFR BLD MANUAL: 3 %
RBC # BLD AUTO: 3.72 M/UL (ref 4.6–6.2)
SMUDGE CELLS BLD QL SMEAR: PRESENT
SPHEROCYTES BLD QL SMEAR: ABNORMAL
TOXIC GRANULES BLD QL SMEAR: PRESENT
WBC # BLD AUTO: 36.75 K/UL (ref 3.9–12.7)

## 2025-04-03 NOTE — PROGRESS NOTES
Section of Hematology and Stem Cell Transplantation  New Patient Consult     Date of visit: 4/4/25  Referred by:  Mark Saravia MD  Reason for visit: Autologous stem cell transplant candidate    Oncologic History:     Primary Oncologic Diagnosis: IgG lambda multiple myeloma, standard risk, R-ISS stage  NA  Presentation: Anemia  Initial workup:  Labs:  Hemoglobin: NA  Creatinine: NA  Calcium: NA  Serologic studies:  POOJA: IgG lambda and IgG kappa  SPEP: M spike of 3.2  Free light chains: Ratio >400  Immunoglobulins: NA  PET/CT:  10/15/24: PET CT with widely metastatic lytic bone lesions in the left scapula, right glenoid, left posterior 5th rib, right posterior 8th rib, and patchy lesions throughout spine and pelvis   Bone marrow biopsy:  10/14/24: Sparse maturing myelopoiesis and erythropoiesis. 95% plasma cells.   FISH: Clonal plasma cell population (42%) with aberrant expression of CD56 and , cytoplasmic lambda light chain restriction. Gain of chromosome 9, 15, 11q. Gain of 1q21. 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  SPEP with 0.16 g/dL and 0.12 g/dL. FLCs normal.   PET/CT (3/7/25) - no new hypermetabolic lesions.    History of Present Ilness:   Mitchel Joya (Mitchel) is a pleasant 64 y.o.male with multiple myeloma who presents surveillance visit prior to admission for autoSCT. He had an ultrasound on his central line the other day that was negative for DVT. His primary complaint is due to peripheral neuropathy (numbness, burning pain) in his feet, worse at night and started pregabalin 3 weeks ago. He has not noticed any  significant improvements. He reports that yesterday he had some chest congestion and coughed up some dark, yellow phlegm. He has some chronic complaints of phlegm with swallowing complaints for which he believes he started protonix, but he has not actual complaints of GERD. His bowels have been more mucousy than before. Denies fever, chills, sore throat, runny nose, dysuria, shortness of breath, chest pain, nause/vomiting.      PAST MEDICAL HISTORY:   Past Medical History:   Diagnosis Date    Coronary artery disease     Hyperlipidemia        PAST SURGICAL HISTORY:   Past Surgical History:   Procedure Laterality Date    BONE MARROW BIOPSY Right 3/5/2025    Procedure: Biopsy-bone marrow;  Surgeon: Kevin Hemphill MD;  Location: [facility] ([address] [place] [address]);  Service: Oncology;  Laterality: Right;    CHOLECYSTECTOMY      heart stint      KNEE SURGERY      left    PLACEMENT OF DUAL-LUMEN VASCULAR CATHETER Right 3/28/2025    Procedure: INSERTION-CATHETER-MONA: Double Lumen, Bard 14.5 Fr. Hemosplit Cath, Model# [id number], Right vs Left;  Surgeon: Miguel Lawson MD;  Location: [facility] OR [address] [place] [address];  Service: General;  Laterality: Right;    SHOULDER SURGERY      SPINE SURGERY         PAST SOCIAL HISTORY:  Social History     Tobacco Use    Smoking status: Never    Smokeless tobacco: Never   Substance Use Topics    Alcohol use: Yes     Comment: occ    Drug use: Never       FAMILY HISTORY:  Family History   Problem Relation Name Age of Onset    No Known Problems Mother      Heart disease Father         CURRENT MEDICATIONS:   No current facility-administered medications for this visit.     No current outpatient medications on file.     Facility-Administered Medications Ordered in Other Visits   Medication    [START ON 4/8/2025] 0.9 % NaCl with KCl 20 mEq infusion    0.9 % NaCl with KCl 20 mEq infusion    acyclovir capsule 800 mg    alteplase injection 2 mg    alteplase injection 2 mg    aluminum-magnesium hydroxide-simethicone  200-200-20 mg/5 mL suspension 30 mL    [START ON 4/8/2025] EPINEPHrine (PF) injection 0.5 mg    And    [START ON 4/8/2025] hydrocortisone sodium succinate injection 100 mg    And    [START ON 4/8/2025] diphenhydrAMINE injection 50 mg    And    [START ON 4/8/2025] meperidine injection 50 mg    And    [START ON 4/8/2025] furosemide injection 100 mg    And    [START ON 4/8/2025] cloNIDine tablet 0.1 mg    And    [START ON 4/8/2025] nitroGLYCERIN SL tablet 0.4 mg    dexAMETHasone tablet 12 mg    diphenhydrAMINE capsule 25 mg    [START ON 4/8/2025] diphenhydrAMINE injection 50 mg    And    [START ON 4/8/2025] hydrocortisone sod succ (PF) injection 250 mg    And    [START ON 4/8/2025] LORazepam injection 1 mg    diphenhydrAMINE injection 50 mg    DULoxetine DR capsule 20 mg    EPINEPHrine (EPIPEN) 0.3 mg/0.3 mL pen injection 0.3 mg    [START ON 4/15/2025] filgrastim (NEUPOGEN) injection 480 mcg/1.6 ml    fluconazole tablet 400 mg    heparin (porcine) injection 1,600 Units    heparin, porcine (PF) 100 unit/mL injection flush 300 Units    hydrocortisone sodium succinate injection 100 mg    k phos di & mono-sod phos mono 250 mg tablet 1 tablet    k phos di & mono-sod phos mono 250 mg tablet 2 tablet    levoFLOXacin tablet 500 mg    levothyroxine tablet 88 mcg    LORazepam injection 1 mg    magnesium oxide tablet 400 mg    magnesium oxide tablet 400 mg    magnesium oxide tablet 800 mg    melatonin tablet 6 mg    melphalan Hcl-betadex sbes (EVOMELA) 437.5 mg in 0.9% NaCl 500 mL IVPB    OLANZapine tablet 5 mg    ondansetron disintegrating tablet 8 mg    pantoprazole EC tablet 40 mg    potassium chloride SA CR tablet 20 mEq    potassium chloride SA CR tablet 20 mEq    potassium chloride SA CR tablet 20 mEq    pregabalin capsule 75 mg    prochlorperazine injection Soln 10 mg    sodium bicarb-sodium chloride powder 1 Dose    sodium chloride 0.9% flush 10 mL    [START ON 5/8/2025] sulfamethoxazole-trimethoprim 800-160mg per tablet  1 tablet       ALLERGIES:   Review of patient's allergies indicates:  No Known Allergies    Review of Systems:     Pertinent positives and negatives included in the HPI. Otherwise a complete review of systems is negative.    Physical Exam:     Vitals:    04/04/25 1501   BP: (!) 153/92   Pulse: 91   Resp: 18   Temp: 98.3 °F (36.8 °C)       Physical Exam  Vitals reviewed.   Constitutional:       General: He is not in acute distress.     Appearance: Normal appearance. He is not ill-appearing.   HENT:      Head: Normocephalic and atraumatic.      Nose: Congestion present. No rhinorrhea.      Mouth/Throat:      Mouth: Mucous membranes are moist.      Pharynx: Oropharynx is clear. No oropharyngeal exudate.   Eyes:      Pupils: Pupils are equal, round, and reactive to light.   Cardiovascular:      Rate and Rhythm: Normal rate and regular rhythm.      Pulses: Normal pulses.      Heart sounds: Normal heart sounds.   Pulmonary:      Effort: Pulmonary effort is normal.      Breath sounds: Normal breath sounds. No wheezing.   Chest:      Comments: Right chest central line, no bruising/bleeding, dressing C/D/I  Abdominal:      General: Bowel sounds are normal. There is no distension.      Palpations: Abdomen is soft.      Tenderness: There is no abdominal tenderness.   Musculoskeletal:         General: Normal range of motion.      Cervical back: Normal range of motion and neck supple.      Right lower leg: Edema present.      Left lower leg: Edema present.      Comments: Mild ankle edema   Skin:     General: Skin is warm and dry.      Findings: No bruising, lesion or rash.   Neurological:      Mental Status: He is alert and oriented to person, place, and time.      Motor: No weakness.   Psychiatric:         Mood and Affect: Mood normal.         Thought Content: Thought content normal.          ECOG Performance Status: (foot note - ECOG PS provided by Eastern Cooperative Oncology Group) 1 - Symptomatic but completely  ambulatory    Karnofsky Performance Score:  90%- Able to Carry on Normal Activity: Minor Symptoms of Disease      Labs:   Lab Results   Component Value Date    WBC 8.31 04/07/2025    RBC 3.83 (L) 04/07/2025    HGB 11.3 (L) 04/07/2025    HCT 34.5 (L) 04/07/2025    MCV 90 04/07/2025    MCH 29.5 04/07/2025    MCHC 32.8 04/07/2025    RDW 18.3 (H) 04/07/2025    PLT 86 (L) 04/07/2025    MPV 11.2 04/07/2025    GRAN 26.8 04/01/2025    LYMPH 14.0 (L) 04/07/2025    LYMPH 1.16 04/07/2025    MONO 10.1 04/07/2025    MONO 0.84 04/07/2025    EOS 4.0 04/07/2025    EOS 0.33 04/07/2025    BASO 0.05 03/10/2025    EOSINOPHIL 2.0 04/01/2025    BASOPHIL 0.4 04/07/2025    BASOPHIL 0.03 04/07/2025       CMP  Sodium   Date Value Ref Range Status   04/07/2025 139 136 - 145 mmol/L Final   03/10/2025 136 136 - 145 mmol/L Final     Potassium   Date Value Ref Range Status   04/07/2025 3.8 3.5 - 5.1 mmol/L Final   03/10/2025 3.5 3.5 - 5.1 mmol/L Final     Chloride   Date Value Ref Range Status   04/07/2025 107 95 - 110 mmol/L Final   03/10/2025 102 95 - 110 mmol/L Final     CO2   Date Value Ref Range Status   04/07/2025 24 23 - 29 mmol/L Final   03/10/2025 27 23 - 29 mmol/L Final     Glucose   Date Value Ref Range Status   03/10/2025 101 70 - 110 mg/dL Final     BUN   Date Value Ref Range Status   04/07/2025 15 8 - 23 mg/dL Final     Creatinine   Date Value Ref Range Status   04/07/2025 0.7 0.5 - 1.4 mg/dL Final     Calcium   Date Value Ref Range Status   04/07/2025 9.1 8.7 - 10.5 mg/dL Final   03/10/2025 9.4 8.7 - 10.5 mg/dL Final     Total Protein   Date Value Ref Range Status   03/10/2025 6.8 6.0 - 8.4 g/dL Final     Albumin   Date Value Ref Range Status   04/07/2025 3.2 (L) 3.5 - 5.2 g/dL Final   03/10/2025 3.7 3.5 - 5.2 g/dL Final     Total Bilirubin   Date Value Ref Range Status   03/10/2025 0.3 0.1 - 1.0 mg/dL Final     Comment:     For infants and newborns, interpretation of results should be based  on gestational age, weight and in  agreement with clinical  observations.    Premature Infant recommended reference ranges:  Up to 24 hours.............<8.0 mg/dL  Up to 48 hours............<12.0 mg/dL  3-5 days..................<15.0 mg/dL  6-29 days.................<15.0 mg/dL       Bilirubin Total   Date Value Ref Range Status   04/07/2025 0.2 0.1 - 1.0 mg/dL Final     Comment:     For infants and newborns, interpretation of results should be based   on gestational age, weight and in agreement with clinical   observations.    Premature Infant recommended reference ranges:   0-24 hours:  <8.0 mg/dL   24-48 hours: <12.0 mg/dL   3-5 days:    <15.0 mg/dL   6-29 days:   <15.0 mg/dL     Alkaline Phosphatase   Date Value Ref Range Status   03/10/2025 84 40 - 150 U/L Final     ALP   Date Value Ref Range Status   04/07/2025 114 40 - 150 unit/L Final     AST   Date Value Ref Range Status   04/07/2025 18 11 - 45 unit/L Final   03/10/2025 27 10 - 40 U/L Final     ALT   Date Value Ref Range Status   04/07/2025 27 10 - 44 unit/L Final   03/10/2025 48 (H) 10 - 44 U/L Final     Anion Gap   Date Value Ref Range Status   04/07/2025 8 8 - 16 mmol/L Final       Imaging:  Reviewed     Pathology:  Reviewed        Assessment and Plan:   Mitchel Joya (Mitchel) is a pleasant 64 y.o.male with multiple myeloma who presents for follow up.    Autologous stem cell transplant candidate:  We discussed the role for autologous stem cell transplantation in multiple myeloma as a means to improve progression free survival (not curative) and provide prolonged disease control. We had an extensive discussion about the rationale, logistics, risks, and benefits. We reviewed the requirement to stay in the [place] area for 30 days with a caregiver at all times. We discussed the risks, including infection, organ toxicity, relapse of disease, and secondary cancers. We reviewed the need for maintenance therapy starting around day 100. Consent signed today.   - Coordinator: Xochilt Flowers  Conditioning Regimen: Melphalan 200 mg/m2  - Cell Dose: 5-10 x 10^6 CD34/kg  - Covid negative 4/4/25 for planned admission on 4/6/25, charge nurse to call patient, coming from [place].  - Today is day -1.   - Maintenance: Korina+Rev  - Caregiver: Partner   - Housing: [facility]     Planned conditioning regimen:  Melphalan 200 on Day -1     Antimicrobial Prophylaxis:  Acyclovir starting on Day -1  Levofloxacin starting on Day -1  Fluconazole starting on Day -1  Bactrim starting on Day +30     Growth Factor Support:  Neupogen starting on Day +7      Caregiver: Cherie Toan, other people while at the [facility] (discharge)      Sinus Congestion  - Cough and yellow phlegm reported  - Respiratory infection panel done 4/4/25 and negative.    Multiple myeloma, standard risk, R-ISS stage  NA :  His oncologic history is detailed above. He remains on treatment with Korina VRd. He achieved VGPR with induction pre-transplant. Treament held in anticipation of collection and transplant.  - See autoSCT above.    Immunodeficiency due to conditions classified elsewhere:  Continue valacyclovir for shingles prophylaxis  Plan for bactrim MWF on day +30 through day +180    Hypothyroidism  - Continue synthroid 88 mcg prior to breakfast daily    Chemotherapy Induced Neuropathy  - Lyrica 75 mg nightly, started 3 weeks ago, has not seen significant effect  - Duloxetine 20 mg daily, patient hasn't started medication    Insomnia  - Reports difficulty falling asleep and staying asleep, has tried ambien briefly in the past  - Reports sometimes taking phenergan, so may benefit from sleep-aid      Orders/Follow Up:      Orders Placed This Encounter    Respiratory Infection Panel (PCR), Nasopharyngeal       Route Chart for Scheduling    BMT Chart Routing      Follow up with physician    Follow up with AXEL Deutsch: 4/25 or 4/28, autoSCT follow up   Provider visit type    Infusion scheduling note    Injection scheduling note    Labs CBC, CMP,  magnesium, phosphorus and type and screen   Scheduling:  Preferred lab:  Lab interval:  Prior to follow up   Imaging    Pharmacy appointment    Other referrals                  Treatment Plan Information   BMT MA Autologous melphalan 200  Kevin Hemphill MD   Associated diagnosis: Multiple myeloma not having achieved remission   noted on 3/10/2025   Line of treatment: BMT Conditioning  Treatment Goal: Control     Upcoming Treatment Dates - BMT MA Autologous melphalan 200     No upcoming days in selected categories.    Supportive Plan Information  OP STEM CELL MOBILIZATION (FILGRASTIM/PLERIXAFOR) La Nena Pastrana NP   Associated Diagnosis: Stem cell transplant candidate   noted on 3/12/2025  Associated Diagnosis: Multiple myeloma not having achieved remission   noted on 3/10/2025   Line of treatment: Supportive Care   Treatment goal: Supportive     Upcoming Treatment Dates - OP STEM CELL MOBILIZATION (FILGRASTIM/PLERIXAFOR)    4/3/2025       Supportive Care       filgrastim (NEUPOGEN) injection 480 mcg/1.6 ml      Total time of this visit was 45 minutes, including time spent face to face with patient and/or via video/audio, and also in preparing for today's visit for MDM and documentation. (Medical Decision Making, including consideration of possible diagnoses, management options, complex medical record review, review of diagnostic tests and information, consideration and discussion of significant complications based on comorbidities, and discussion with providers involved with the care of the patient). Greater than 50% was spent face to face with the patient counseling and coordinating care.    La Nena Pastrana, RADHAP-AQUILES  Hematologic Malignancies, Stem Cell Transplantation, and Cellular Therapy  [facility]

## 2025-04-04 ENCOUNTER — INFUSION (OUTPATIENT)
Dept: INFUSION THERAPY | Facility: HOSPITAL | Age: 65
End: 2025-04-04
Payer: COMMERCIAL

## 2025-04-04 ENCOUNTER — CLINICAL SUPPORT (OUTPATIENT)
Dept: HEMATOLOGY/ONCOLOGY | Facility: CLINIC | Age: 65
End: 2025-04-04
Payer: COMMERCIAL

## 2025-04-04 ENCOUNTER — OFFICE VISIT (OUTPATIENT)
Dept: HEMATOLOGY/ONCOLOGY | Facility: CLINIC | Age: 65
End: 2025-04-04
Payer: COMMERCIAL

## 2025-04-04 VITALS
DIASTOLIC BLOOD PRESSURE: 92 MMHG | WEIGHT: 222.88 LBS | RESPIRATION RATE: 18 BRPM | TEMPERATURE: 98 F | HEART RATE: 91 BPM | HEIGHT: 69 IN | BODY MASS INDEX: 33.01 KG/M2 | SYSTOLIC BLOOD PRESSURE: 153 MMHG | OXYGEN SATURATION: 97 %

## 2025-04-04 DIAGNOSIS — C90.00 MULTIPLE MYELOMA NOT HAVING ACHIEVED REMISSION: ICD-10-CM

## 2025-04-04 DIAGNOSIS — D63.0 ANEMIA IN NEOPLASTIC DISEASE: ICD-10-CM

## 2025-04-04 DIAGNOSIS — D69.6 THROMBOCYTOPENIA: ICD-10-CM

## 2025-04-04 DIAGNOSIS — C90.01 MULTIPLE MYELOMA IN REMISSION: ICD-10-CM

## 2025-04-04 DIAGNOSIS — D84.81 IMMUNODEFICIENCY DUE TO CONDITIONS CLASSIFIED ELSEWHERE: ICD-10-CM

## 2025-04-04 DIAGNOSIS — Z76.82 STEM CELL TRANSPLANT CANDIDATE: ICD-10-CM

## 2025-04-04 DIAGNOSIS — R09.81 SINUS CONGESTION: Primary | ICD-10-CM

## 2025-04-04 DIAGNOSIS — F51.02 ADJUSTMENT INSOMNIA: ICD-10-CM

## 2025-04-04 DIAGNOSIS — C90.00 MULTIPLE MYELOMA NOT HAVING ACHIEVED REMISSION: Primary | ICD-10-CM

## 2025-04-04 PROBLEM — G62.9 PERIPHERAL NEUROPATHY: Status: ACTIVE | Noted: 2025-04-04

## 2025-04-04 PROBLEM — Z79.69 IMMUNOSUPPRESSED DUE TO CHEMOTHERAPY: Status: ACTIVE | Noted: 2025-04-04

## 2025-04-04 PROBLEM — E78.5 HYPERLIPIDEMIA: Status: ACTIVE | Noted: 2025-04-04

## 2025-04-04 PROBLEM — D84.821 IMMUNOSUPPRESSED DUE TO CHEMOTHERAPY: Status: ACTIVE | Noted: 2025-04-04

## 2025-04-04 PROBLEM — D68.59 HYPERCOAGULABLE STATE: Status: ACTIVE | Noted: 2025-04-04

## 2025-04-04 PROBLEM — E03.9 HYPOTHYROID: Status: ACTIVE | Noted: 2025-04-04

## 2025-04-04 PROBLEM — T45.1X5A IMMUNOSUPPRESSED DUE TO CHEMOTHERAPY: Status: ACTIVE | Noted: 2025-04-04

## 2025-04-04 LAB
ABSOLUTE EOSINOPHIL (OHS): 0.38 K/UL
ABSOLUTE MONOCYTE (OHS): 1.4 K/UL (ref 0.3–1)
ABSOLUTE NEUTROPHIL COUNT (OHS): 6.96 K/UL (ref 1.8–7.7)
ADENOVIRUS: NOT DETECTED
ALBUMIN SERPL BCP-MCNC: 3.6 G/DL (ref 3.5–5.2)
ALP SERPL-CCNC: 190 UNIT/L (ref 40–150)
ALT SERPL W/O P-5'-P-CCNC: 36 UNIT/L (ref 10–44)
ANION GAP (OHS): 8 MMOL/L (ref 8–16)
AST SERPL-CCNC: 24 UNIT/L (ref 11–45)
BASOPHILS # BLD AUTO: 0.06 K/UL
BASOPHILS NFR BLD AUTO: 0.6 %
BILIRUB SERPL-MCNC: 0.3 MG/DL (ref 0.1–1)
BORDETELLA PARAPERTUSSIS (IS1001): NOT DETECTED
BORDETELLA PERTUSSIS (PTXP): NOT DETECTED
BUN SERPL-MCNC: 13 MG/DL (ref 8–23)
CALCIUM SERPL-MCNC: 9.3 MG/DL (ref 8.7–10.5)
CHLAMYDIA PNEUMONIAE: NOT DETECTED
CHLORIDE SERPL-SCNC: 105 MMOL/L (ref 95–110)
CO2 SERPL-SCNC: 26 MMOL/L (ref 23–29)
CORONAVIRUS 229E, COMMON COLD VIRUS: NOT DETECTED
CORONAVIRUS HKU1, COMMON COLD VIRUS: NOT DETECTED
CORONAVIRUS NL63, COMMON COLD VIRUS: NOT DETECTED
CORONAVIRUS OC43, COMMON COLD VIRUS: NOT DETECTED
CREAT SERPL-MCNC: 0.7 MG/DL (ref 0.5–1.4)
ERYTHROCYTE [DISTWIDTH] IN BLOOD BY AUTOMATED COUNT: 18.5 % (ref 11.5–14.5)
FLUBV RNA NPH QL NAA+NON-PROBE: NOT DETECTED
GFR SERPLBLD CREATININE-BSD FMLA CKD-EPI: >60 ML/MIN/1.73/M2
GLUCOSE SERPL-MCNC: 94 MG/DL (ref 70–110)
HCT VFR BLD AUTO: 38.3 % (ref 40–54)
HGB BLD-MCNC: 12.4 GM/DL (ref 14–18)
HPIV1 RNA NPH QL NAA+NON-PROBE: NOT DETECTED
HPIV2 RNA NPH QL NAA+NON-PROBE: NOT DETECTED
HPIV3 RNA NPH QL NAA+NON-PROBE: NOT DETECTED
HPIV4 RNA NPH QL NAA+NON-PROBE: NOT DETECTED
HUMAN METAPNEUMOVIRUS: NOT DETECTED
IMM GRANULOCYTES # BLD AUTO: 0.21 K/UL (ref 0–0.04)
IMM GRANULOCYTES NFR BLD AUTO: 2 % (ref 0–0.5)
INDIRECT COOMBS: NORMAL
INFLUENZA A: NOT DETECTED
LYMPHOCYTES # BLD AUTO: 1.34 K/UL (ref 1–4.8)
MCH RBC QN AUTO: 29.4 PG (ref 27–31)
MCHC RBC AUTO-ENTMCNC: 32.4 G/DL (ref 32–36)
MCV RBC AUTO: 91 FL (ref 82–98)
MYCOPLASMA PNEUMONIAE: NOT DETECTED
NUCLEATED RBC (/100WBC) (OHS): 0 /100 WBC
PLATELET # BLD AUTO: 83 K/UL (ref 150–450)
PMV BLD AUTO: 11 FL (ref 9.2–12.9)
POTASSIUM SERPL-SCNC: 3.9 MMOL/L (ref 3.5–5.1)
PROT SERPL-MCNC: 6.5 GM/DL (ref 6–8.4)
RBC # BLD AUTO: 4.22 M/UL (ref 4.6–6.2)
RELATIVE EOSINOPHIL (OHS): 3.7 %
RELATIVE LYMPHOCYTE (OHS): 12.9 % (ref 18–48)
RELATIVE MONOCYTE (OHS): 13.5 % (ref 4–15)
RELATIVE NEUTROPHIL (OHS): 67.3 % (ref 38–73)
RESPIRATORY INFECTION PANEL SOURCE: NORMAL
RH BLD: NORMAL
RSV RNA NPH QL NAA+NON-PROBE: NOT DETECTED
RV+EV RNA NPH QL NAA+NON-PROBE: NOT DETECTED
SARS-COV-2 RDRP RESP QL NAA+PROBE: NEGATIVE
SARS-COV-2 RNA RESP QL NAA+PROBE: NOT DETECTED
SODIUM SERPL-SCNC: 139 MMOL/L (ref 136–145)
SPECIMEN OUTDATE: NORMAL
WBC # BLD AUTO: 10.35 K/UL (ref 3.9–12.7)

## 2025-04-04 PROCEDURE — U0002 COVID-19 LAB TEST NON-CDC: HCPCS

## 2025-04-04 PROCEDURE — 3288F FALL RISK ASSESSMENT DOCD: CPT | Mod: CPTII,S$GLB,,

## 2025-04-04 PROCEDURE — 36415 COLL VENOUS BLD VENIPUNCTURE: CPT

## 2025-04-04 PROCEDURE — 85025 COMPLETE CBC W/AUTO DIFF WBC: CPT

## 2025-04-04 PROCEDURE — 3080F DIAST BP >= 90 MM HG: CPT | Mod: CPTII,S$GLB,,

## 2025-04-04 PROCEDURE — A4216 STERILE WATER/SALINE, 10 ML: HCPCS

## 2025-04-04 PROCEDURE — 63600175 PHARM REV CODE 636 W HCPCS

## 2025-04-04 PROCEDURE — 0202U NFCT DS 22 TRGT SARS-COV-2: CPT

## 2025-04-04 PROCEDURE — 1125F AMNT PAIN NOTED PAIN PRSNT: CPT | Mod: CPTII,S$GLB,,

## 2025-04-04 PROCEDURE — 25000003 PHARM REV CODE 250

## 2025-04-04 PROCEDURE — 1101F PT FALLS ASSESS-DOCD LE1/YR: CPT | Mod: CPTII,S$GLB,,

## 2025-04-04 PROCEDURE — 99999 PR PBB SHADOW E&M-EST. PATIENT-LVL I: CPT | Mod: PBBFAC,,,

## 2025-04-04 PROCEDURE — 99215 OFFICE O/P EST HI 40 MIN: CPT | Mod: S$GLB,,,

## 2025-04-04 PROCEDURE — 80053 COMPREHEN METABOLIC PANEL: CPT

## 2025-04-04 PROCEDURE — 1159F MED LIST DOCD IN RCRD: CPT | Mod: CPTII,S$GLB,,

## 2025-04-04 PROCEDURE — 99999 PR PBB SHADOW E&M-EST. PATIENT-LVL IV: CPT | Mod: PBBFAC,,,

## 2025-04-04 PROCEDURE — 36592 COLLECT BLOOD FROM PICC: CPT

## 2025-04-04 PROCEDURE — 86901 BLOOD TYPING SEROLOGIC RH(D): CPT | Performed by: INTERNAL MEDICINE

## 2025-04-04 PROCEDURE — 3008F BODY MASS INDEX DOCD: CPT | Mod: CPTII,S$GLB,,

## 2025-04-04 PROCEDURE — 3077F SYST BP >= 140 MM HG: CPT | Mod: CPTII,S$GLB,,

## 2025-04-04 RX ORDER — SODIUM CHLORIDE 0.9 % (FLUSH) 0.9 %
10 SYRINGE (ML) INJECTION
Status: DISCONTINUED | OUTPATIENT
Start: 2025-04-04 | End: 2025-04-04 | Stop reason: HOSPADM

## 2025-04-04 RX ORDER — HEPARIN SODIUM 1000 [USP'U]/ML
1500 INJECTION, SOLUTION INTRAVENOUS; SUBCUTANEOUS
Status: COMPLETED | OUTPATIENT
Start: 2025-04-04 | End: 2025-04-04

## 2025-04-04 RX ADMIN — HEPARIN SODIUM 1500 UNITS: 1000 INJECTION, SOLUTION INTRAVENOUS; SUBCUTANEOUS at 02:04

## 2025-04-04 RX ADMIN — Medication 10 ML: at 02:04

## 2025-04-04 NOTE — ASSESSMENT & PLAN NOTE
Patient of Dr. Hemphill  - Coordinator: Xochilt Diaz  - Conditioning Regimen: Melphalan 200 mg/m2  - Cell Dose: 5-10 x 10^6 CD34/kg  - Maintenance: Korina+Rev  - Caregiver: Partner   - Housing: [place] [place]   Admitting today for Jennifer 200 Auto, day -2  Stem cell infusion scheduled for Tuesday, 4/08/25 at 13:30. Patient will be receiving 5 bags with a total CD34 dose of 4.39 x10^6/kg.    Planned conditioning regimen:  Melphalan 200 on Day -1     Antimicrobial Prophylaxis:  Acyclovir starting on Day -1  Levofloxacin starting on Day -1  Fluconazole starting on Day -1  Bactrim starting on Day +30     Growth Factor Support:  Neupogen starting on Day +7

## 2025-04-04 NOTE — HPI
64 year old male with history of multiple myeloma, peripheral neuropathy, hypothyroidism and hyperlipidemia presents as a direct admission for Jennifer 200 Autologous stem cell transplant. Underwent stem cell mobilization and collection without issue. He had an ultrasound on his central line 3/31 that was negative for DVT. His primary complaint is due to peripheral neuropathy (numbness, burning pain) in his feet, worse at night and started pregabalin 3 weeks ago. He has not noticed any significant improvements. He reports that 4/3 he had some chest congestion and coughed up some dark, yellow phlegm. He has some chronic complaints of phlegm with swallowing complaints for which he believes he started protonix, but he has not actual complaints of GERD. His bowels have been more mucousy than before. Denies fever, chills, sore throat, runny nose, dysuria, shortness of breath, chest pain, nause/vomiting. He was seen in clinic for admission visit on 4/4 and respiratory infection panel was completed.

## 2025-04-04 NOTE — ASSESSMENT & PLAN NOTE
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  SPEP with 0.16 g/dL and 0.12 g/dL. FLCs normal.   PET/CT (3/7/25) - no new hypermetabolic lesions.

## 2025-04-04 NOTE — ASSESSMENT & PLAN NOTE
- will start ppx levaquin and diflucan   - on ppx Valtrex at home, no reported history of shingles. Will transition to ppx acyclovir.

## 2025-04-04 NOTE — ASSESSMENT & PLAN NOTE
There is no height or weight on file to calculate BMI. Morbid obesity complicates all aspects of disease management from diagnostic modalities to treatment. Weight loss encouraged and health benefits explained to patient.

## 2025-04-04 NOTE — ASSESSMENT & PLAN NOTE
- on daily ASA at home, will hold while inpatient  - awaiting admission platelet count, if >50k will start ppx lovenox

## 2025-04-04 NOTE — SUBJECTIVE & OBJECTIVE
Patient information was obtained from patient and past medical records.     Oncology History:   IgG lambda multiple myeloma, standard risk, R-ISS stage  NA     Presentation: Anemia  Initial workup:  Labs:  Hemoglobin: NA  Creatinine: NA  Calcium: NA  Serologic studies:  POOJA: IgG lambda and IgG kappa  SPEP: M spike of 3.2  Free light chains: Ratio >400  Immunoglobulins: NA  PET/CT:  10/15/24: PET CT with widely metastatic lytic bone lesions in the left scapula, right glenoid, left posterior 5th rib, right posterior 8th rib, and patchy lesions throughout spine and pelvis   Bone marrow biopsy:  10/14/24: Sparse maturing myelopoiesis and erythropoiesis. 95% plasma cells.   FISH: Clonal plasma cell population (42%) with aberrant expression of CD56 and , cytoplasmic lambda light chain restriction. Gain of chromosome 9, 15, 11q. Gain of 1q21. 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  SPEP with 0.16 g/dL and 0.12 g/dL. FLCs normal.   PET/CT (3/7/25) - no new hypermetabolic lesions.       Prescriptions Prior to Admission[1]    Patient has no known allergies.     Past Medical History:   Diagnosis Date    Hyperlipidemia      Past Surgical History:   Procedure Laterality Date    BONE MARROW BIOPSY Right 3/5/2025    Procedure: Biopsy-bone marrow;  Surgeon: Kevin Hemphill MD;  Location: [facility] ([address] [place] [address]);  Service: Oncology;  Laterality: Right;    CHOLECYSTECTOMY      heart stint      KNEE SURGERY      left    PLACEMENT OF DUAL-LUMEN VASCULAR CATHETER Right 3/28/2025    Procedure: INSERTION-CATHETER-MONA: Double Lumen, Bard 14.5 Fr. Hemosplit  [facility], Model# [id number], Right vs Left;  Surgeon: Miguel Lawson MD;  Location: [facility] OR [address] [place] [address];  Service: General;  Laterality: Right;    SHOULDER SURGERY      SPINE SURGERY       Family History       Problem Relation (Age of Onset)    Heart disease Father    No Known Problems Mother          Tobacco Use    Smoking status: Never    Smokeless tobacco: Never   Substance and Sexual Activity    Alcohol use: Yes     Comment: occ    Drug use: Never    Sexual activity: Not on file       Review of Systems   Constitutional:  Negative for chills and fever.   HENT:  Positive for congestion. Negative for nosebleeds.    Eyes:  Negative for photophobia and visual disturbance.   Respiratory:  Negative for shortness of breath and wheezing.    Cardiovascular:  Negative for chest pain and palpitations.   Gastrointestinal:  Negative for nausea and vomiting.   Genitourinary:  Negative for dysuria, frequency and hematuria.   Musculoskeletal:  Negative for joint swelling and myalgias.   Neurological:  Negative for dizziness and headaches.   Psychiatric/Behavioral:  Negative for agitation and confusion.      Objective:     Vital Signs (Most Recent):    Vital Signs (24h Range):  BP: ()/()   Arterial Line BP: ()/()         There is no height or weight on file to calculate BMI.  There is no height or weight on file to calculate BSA.    ECOG SCORE           [unfilled]    Lines/Drains/Airways       Central Venous Catheter Line  Duration                  Hemodialysis Catheter 03/28/25 0747 right internal jugular 7 days                     Physical Exam  Constitutional:       Appearance: Normal appearance.   HENT:      Head: Normocephalic and atraumatic.   Eyes:      Extraocular Movements: Extraocular movements intact.      Pupils: Pupils are equal, round, and reactive to light.   Cardiovascular:      Rate and Rhythm: Normal rate and regular rhythm.   Pulmonary:      Effort: Pulmonary effort is normal.      Breath sounds: Normal breath sounds.    Abdominal:      General: Abdomen is flat.      Palpations: Abdomen is soft.   Musculoskeletal:         General: No swelling. Normal range of motion.      Cervical back: Normal range of motion and neck supple.   Skin:     General: Skin is warm and dry.   Neurological:      General: No focal deficit present.      Mental Status: He is alert and oriented to person, place, and time.   Psychiatric:         Mood and Affect: Mood normal.         Behavior: Behavior normal.            Significant Labs:   Lab Results   Component Value Date    WBC 10.35 04/04/2025    HGB 12.4 (L) 04/04/2025    HCT 38.3 (L) 04/04/2025    MCV 91 04/04/2025    PLT 83 (L) 04/04/2025       CMP  Sodium   Date Value Ref Range Status   04/04/2025 139 136 - 145 mmol/L Final   03/10/2025 136 136 - 145 mmol/L Final     Potassium   Date Value Ref Range Status   04/04/2025 3.9 3.5 - 5.1 mmol/L Final   03/10/2025 3.5 3.5 - 5.1 mmol/L Final     Chloride   Date Value Ref Range Status   04/04/2025 105 95 - 110 mmol/L Final   03/10/2025 102 95 - 110 mmol/L Final     CO2   Date Value Ref Range Status   04/04/2025 26 23 - 29 mmol/L Final   03/10/2025 27 23 - 29 mmol/L Final     Glucose   Date Value Ref Range Status   03/10/2025 101 70 - 110 mg/dL Final     BUN   Date Value Ref Range Status   04/04/2025 13 8 - 23 mg/dL Final     Creatinine   Date Value Ref Range Status   04/04/2025 0.7 0.5 - 1.4 mg/dL Final     Calcium   Date Value Ref Range Status   04/04/2025 9.3 8.7 - 10.5 mg/dL Final   03/10/2025 9.4 8.7 - 10.5 mg/dL Final     Total Protein   Date Value Ref Range Status   03/10/2025 6.8 6.0 - 8.4 g/dL Final     Albumin   Date Value Ref Range Status   04/04/2025 3.6 3.5 - 5.2 g/dL Final   03/10/2025 3.7 3.5 - 5.2 g/dL Final     Total Bilirubin   Date Value Ref Range Status   03/10/2025 0.3 0.1 - 1.0 mg/dL Final     Comment:     For infants and newborns, interpretation of results should be based  on gestational age, weight and in agreement with  clinical  observations.    Premature Infant recommended reference ranges:  Up to 24 hours.............<8.0 mg/dL  Up to 48 hours............<12.0 mg/dL  3-5 days..................<15.0 mg/dL  6-29 days.................<15.0 mg/dL       Bilirubin Total   Date Value Ref Range Status   04/04/2025 0.3 0.1 - 1.0 mg/dL Final     Comment:     For infants and newborns, interpretation of results should be based   on gestational age, weight and in agreement with clinical   observations.    Premature Infant recommended reference ranges:   0-24 hours:  <8.0 mg/dL   24-48 hours: <12.0 mg/dL   3-5 days:    <15.0 mg/dL   6-29 days:   <15.0 mg/dL     Alkaline Phosphatase   Date Value Ref Range Status   03/10/2025 84 40 - 150 U/L Final     ALP   Date Value Ref Range Status   04/04/2025 190 (H) 40 - 150 unit/L Final     AST   Date Value Ref Range Status   04/04/2025 24 11 - 45 unit/L Final   03/10/2025 27 10 - 40 U/L Final     ALT   Date Value Ref Range Status   04/04/2025 36 10 - 44 unit/L Final   03/10/2025 48 (H) 10 - 44 U/L Final     Anion Gap   Date Value Ref Range Status   04/04/2025 8 8 - 16 mmol/L Final     eGFR   Date Value Ref Range Status   04/04/2025 >60 >60 mL/min/1.73/m2 Final     Comment:     Estimated GFR calculated using the CKD-EPI creatinine (2021) equation.   03/10/2025 >60.0 >60 mL/min/1.73 m^2 Final         Diagnostic Results:  None         [1]   No medications prior to admission.

## 2025-04-06 ENCOUNTER — HOSPITAL ENCOUNTER (INPATIENT)
Facility: HOSPITAL | Age: 65
LOS: 16 days | Discharge: HOME | DRG: 016 | End: 2025-04-22
Attending: INTERNAL MEDICINE | Admitting: INTERNAL MEDICINE
Payer: COMMERCIAL

## 2025-04-06 DIAGNOSIS — Z94.84 HISTORY OF AUTOLOGOUS STEM CELL TRANSPLANT: ICD-10-CM

## 2025-04-06 DIAGNOSIS — Z76.82 STEM CELL TRANSPLANT CANDIDATE: Primary | ICD-10-CM

## 2025-04-06 DIAGNOSIS — C90.00 MULTIPLE MYELOMA NOT HAVING ACHIEVED REMISSION: ICD-10-CM

## 2025-04-06 DIAGNOSIS — C90.00 MULTIPLE MYELOMA: ICD-10-CM

## 2025-04-06 PROCEDURE — 20600001 HC STEP DOWN PRIVATE ROOM

## 2025-04-06 PROCEDURE — 25000003 PHARM REV CODE 250: Performed by: INTERNAL MEDICINE

## 2025-04-06 PROCEDURE — 25000003 PHARM REV CODE 250: Performed by: NURSE PRACTITIONER

## 2025-04-06 PROCEDURE — 63600175 PHARM REV CODE 636 W HCPCS: Performed by: INTERNAL MEDICINE

## 2025-04-06 RX ORDER — CLONIDINE HYDROCHLORIDE 0.1 MG/1
0.1 TABLET ORAL ONCE AS NEEDED
Status: DISCONTINUED | OUTPATIENT
Start: 2025-04-08 | End: 2025-04-07

## 2025-04-06 RX ORDER — EPINEPHRINE 1 MG/ML
0.5 INJECTION, SOLUTION, CONCENTRATE INTRAVENOUS ONCE AS NEEDED
Status: DISCONTINUED | OUTPATIENT
Start: 2025-04-08 | End: 2025-04-07

## 2025-04-06 RX ORDER — SODIUM CHLORIDE AND POTASSIUM CHLORIDE 150; 900 MG/100ML; MG/100ML
INJECTION, SOLUTION INTRAVENOUS CONTINUOUS
Status: DISCONTINUED | OUTPATIENT
Start: 2025-04-08 | End: 2025-04-07

## 2025-04-06 RX ORDER — POTASSIUM CHLORIDE 20 MEQ/1
20 TABLET, EXTENDED RELEASE ORAL
Status: DISCONTINUED | OUTPATIENT
Start: 2025-04-06 | End: 2025-04-22 | Stop reason: HOSPADM

## 2025-04-06 RX ORDER — ALUMINUM HYDROXIDE, MAGNESIUM HYDROXIDE, AND SIMETHICONE 1200; 120; 1200 MG/30ML; MG/30ML; MG/30ML
30 SUSPENSION ORAL EVERY 6 HOURS PRN
Status: DISCONTINUED | OUTPATIENT
Start: 2025-04-06 | End: 2025-04-22 | Stop reason: HOSPADM

## 2025-04-06 RX ORDER — PANTOPRAZOLE SODIUM 40 MG/1
40 TABLET, DELAYED RELEASE ORAL DAILY
Status: DISCONTINUED | OUTPATIENT
Start: 2025-04-07 | End: 2025-04-07

## 2025-04-06 RX ORDER — HEPARIN SODIUM 1000 [USP'U]/ML
1600 INJECTION, SOLUTION INTRAVENOUS; SUBCUTANEOUS
Status: DISCONTINUED | OUTPATIENT
Start: 2025-04-06 | End: 2025-04-22 | Stop reason: HOSPADM

## 2025-04-06 RX ORDER — SODIUM CHLORIDE AND POTASSIUM CHLORIDE 150; 900 MG/100ML; MG/100ML
INJECTION, SOLUTION INTRAVENOUS CONTINUOUS
Status: DISCONTINUED | OUTPATIENT
Start: 2025-04-06 | End: 2025-04-09

## 2025-04-06 RX ORDER — TALC
6 POWDER (GRAM) TOPICAL NIGHTLY PRN
Status: DISCONTINUED | OUTPATIENT
Start: 2025-04-06 | End: 2025-04-16

## 2025-04-06 RX ORDER — SULFAMETHOXAZOLE AND TRIMETHOPRIM 800; 160 MG/1; MG/1
1 TABLET ORAL
Status: DISCONTINUED | OUTPATIENT
Start: 2025-05-08 | End: 2025-04-22 | Stop reason: HOSPADM

## 2025-04-06 RX ORDER — NITROGLYCERIN 0.4 MG/1
0.4 TABLET SUBLINGUAL ONCE AS NEEDED
Status: DISCONTINUED | OUTPATIENT
Start: 2025-04-08 | End: 2025-04-07

## 2025-04-06 RX ORDER — EPINEPHRINE 0.3 MG/.3ML
0.3 INJECTION SUBCUTANEOUS ONCE AS NEEDED
Status: DISCONTINUED | OUTPATIENT
Start: 2025-04-06 | End: 2025-04-22 | Stop reason: HOSPADM

## 2025-04-06 RX ORDER — PROCHLORPERAZINE EDISYLATE 5 MG/ML
10 INJECTION INTRAMUSCULAR; INTRAVENOUS EVERY 6 HOURS PRN
Status: DISCONTINUED | OUTPATIENT
Start: 2025-04-06 | End: 2025-04-10

## 2025-04-06 RX ORDER — LEVOTHYROXINE SODIUM 88 UG/1
88 TABLET ORAL
Status: DISCONTINUED | OUTPATIENT
Start: 2025-04-07 | End: 2025-04-22 | Stop reason: HOSPADM

## 2025-04-06 RX ORDER — DIPHENHYDRAMINE HCL 25 MG
25 CAPSULE ORAL
Status: DISCONTINUED | OUTPATIENT
Start: 2025-04-06 | End: 2025-04-22 | Stop reason: HOSPADM

## 2025-04-06 RX ORDER — LANOLIN ALCOHOL/MO/W.PET/CERES
400 CREAM (GRAM) TOPICAL EVERY 4 HOURS PRN
Status: DISCONTINUED | OUTPATIENT
Start: 2025-04-06 | End: 2025-04-22 | Stop reason: HOSPADM

## 2025-04-06 RX ORDER — DEXAMETHASONE 4 MG/1
12 TABLET ORAL
Status: COMPLETED | OUTPATIENT
Start: 2025-04-07 | End: 2025-04-07

## 2025-04-06 RX ORDER — LORAZEPAM 2 MG/ML
1 INJECTION INTRAMUSCULAR ONCE
Status: DISCONTINUED | OUTPATIENT
Start: 2025-04-08 | End: 2025-04-07

## 2025-04-06 RX ORDER — ENOXAPARIN SODIUM 100 MG/ML
40 INJECTION SUBCUTANEOUS EVERY 24 HOURS
Status: DISCONTINUED | OUTPATIENT
Start: 2025-04-06 | End: 2025-04-07

## 2025-04-06 RX ORDER — DIPHENHYDRAMINE HYDROCHLORIDE 50 MG/ML
50 INJECTION, SOLUTION INTRAMUSCULAR; INTRAVENOUS ONCE AS NEEDED
Status: COMPLETED | OUTPATIENT
Start: 2025-04-06 | End: 2025-04-08

## 2025-04-06 RX ORDER — LANOLIN ALCOHOL/MO/W.PET/CERES
800 CREAM (GRAM) TOPICAL EVERY 4 HOURS PRN
Status: DISCONTINUED | OUTPATIENT
Start: 2025-04-06 | End: 2025-04-22 | Stop reason: HOSPADM

## 2025-04-06 RX ORDER — DIPHENHYDRAMINE HYDROCHLORIDE 50 MG/ML
50 INJECTION, SOLUTION INTRAMUSCULAR; INTRAVENOUS ONCE
Status: DISCONTINUED | OUTPATIENT
Start: 2025-04-08 | End: 2025-04-07

## 2025-04-06 RX ORDER — ACYCLOVIR 200 MG/1
800 CAPSULE ORAL 2 TIMES DAILY
Status: DISCONTINUED | OUTPATIENT
Start: 2025-04-07 | End: 2025-04-22 | Stop reason: HOSPADM

## 2025-04-06 RX ORDER — DULOXETIN HYDROCHLORIDE 20 MG/1
20 CAPSULE, DELAYED RELEASE ORAL DAILY
Status: DISCONTINUED | OUTPATIENT
Start: 2025-04-07 | End: 2025-04-22 | Stop reason: HOSPADM

## 2025-04-06 RX ORDER — PREGABALIN 75 MG/1
75 CAPSULE ORAL NIGHTLY
Status: DISCONTINUED | OUTPATIENT
Start: 2025-04-06 | End: 2025-04-22 | Stop reason: HOSPADM

## 2025-04-06 RX ORDER — HEPARIN 100 UNIT/ML
300 SYRINGE INTRAVENOUS
Status: DISCONTINUED | OUTPATIENT
Start: 2025-04-06 | End: 2025-04-22 | Stop reason: HOSPADM

## 2025-04-06 RX ORDER — SODIUM CHLORIDE 0.9 % (FLUSH) 0.9 %
10 SYRINGE (ML) INJECTION
Status: DISCONTINUED | OUTPATIENT
Start: 2025-04-06 | End: 2025-04-22 | Stop reason: HOSPADM

## 2025-04-06 RX ORDER — ONDANSETRON 8 MG/1
8 TABLET, ORALLY DISINTEGRATING ORAL
Status: COMPLETED | OUTPATIENT
Start: 2025-04-07 | End: 2025-04-09

## 2025-04-06 RX ORDER — MEPERIDINE HYDROCHLORIDE 50 MG/ML
50 INJECTION INTRAMUSCULAR; INTRAVENOUS; SUBCUTANEOUS ONCE AS NEEDED
Status: DISCONTINUED | OUTPATIENT
Start: 2025-04-08 | End: 2025-04-07

## 2025-04-06 RX ORDER — OLANZAPINE 5 MG/1
5 TABLET, FILM COATED ORAL 2 TIMES DAILY
Status: COMPLETED | OUTPATIENT
Start: 2025-04-06 | End: 2025-04-09

## 2025-04-06 RX ORDER — DIPHENHYDRAMINE HYDROCHLORIDE 50 MG/ML
50 INJECTION, SOLUTION INTRAMUSCULAR; INTRAVENOUS ONCE AS NEEDED
Status: DISCONTINUED | OUTPATIENT
Start: 2025-04-08 | End: 2025-04-07

## 2025-04-06 RX ORDER — FUROSEMIDE 10 MG/ML
100 INJECTION INTRAMUSCULAR; INTRAVENOUS ONCE AS NEEDED
Status: DISCONTINUED | OUTPATIENT
Start: 2025-04-08 | End: 2025-04-07

## 2025-04-06 RX ORDER — FLUCONAZOLE 200 MG/1
400 TABLET ORAL DAILY
Status: DISCONTINUED | OUTPATIENT
Start: 2025-04-07 | End: 2025-04-21

## 2025-04-06 RX ORDER — LEVOFLOXACIN 500 MG/1
500 TABLET, FILM COATED ORAL DAILY
Status: DISCONTINUED | OUTPATIENT
Start: 2025-04-07 | End: 2025-04-18

## 2025-04-06 RX ORDER — LORAZEPAM 2 MG/ML
1 INJECTION INTRAMUSCULAR EVERY 6 HOURS PRN
Status: DISCONTINUED | OUTPATIENT
Start: 2025-04-06 | End: 2025-04-22 | Stop reason: HOSPADM

## 2025-04-06 RX ADMIN — PREGABALIN 75 MG: 75 CAPSULE ORAL at 10:04

## 2025-04-06 RX ADMIN — OLANZAPINE 5 MG: 5 TABLET, FILM COATED ORAL at 10:04

## 2025-04-06 RX ADMIN — Medication 6 MG: at 10:04

## 2025-04-06 RX ADMIN — HEPARIN SODIUM 1600 UNITS: 1000 INJECTION INTRAVENOUS; SUBCUTANEOUS at 10:04

## 2025-04-06 NOTE — H&P
[facility] - Oncology ([facility])  Hematology  Bone Marrow Transplant  H&P    Subjective:     Principal Problem: Stem cell transplant candidate    HPI: 64 year old male with history of multiple myeloma, peripheral neuropathy, hypothyroidism and hyperlipidemia presents as a direct admission for Jennifer 200 Autologous stem cell transplant. Underwent stem cell mobilization and collection without issue. He had an ultrasound on his central line 3/31 that was negative for DVT. His primary complaint is due to peripheral neuropathy (numbness, burning pain) in his feet, worse at night and started pregabalin 3 weeks ago. He has not noticed any significant improvements. He reports that 4/3 he had some chest congestion and coughed up some dark, yellow phlegm. He has some chronic complaints of phlegm with swallowing complaints for which he believes he started protonix, but he has not actual complaints of GERD. His bowels have been more mucousy than before. Denies fever, chills, sore throat, runny nose, dysuria, shortness of breath, chest pain, nause/vomiting. He was seen in clinic for admission visit on 4/4 and respiratory infection panel was completed.     Patient information was obtained from patient and past medical records.     Oncology History:   IgG lambda multiple myeloma, standard risk, R-ISS stage  NA     Presentation: Anemia  Initial workup:  Labs:  Hemoglobin: NA  Creatinine: NA  Calcium: NA  Serologic studies:  POOJA: IgG lambda and IgG kappa  SPEP: M spike of 3.2  Free light chains: Ratio >400  Immunoglobulins: NA  PET/CT:  10/15/24: PET CT with widely metastatic lytic bone lesions in the left scapula, right glenoid, left posterior 5th rib, right posterior 8th rib, and patchy lesions throughout spine and pelvis   Bone marrow biopsy:  10/14/24: Sparse maturing myelopoiesis and erythropoiesis. 95% plasma cells.   FISH: Clonal plasma cell population (42%) with aberrant expression of CD56 and , cytoplasmic lambda light  chain restriction. Gain of chromosome 9, 15, 11q. Gain of 1q21. 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  SPEP with 0.16 g/dL and 0.12 g/dL. FLCs normal.   PET/CT (3/7/25) - no new hypermetabolic lesions.       Prescriptions Prior to Admission[1]    Patient has no known allergies.     Past Medical History:   Diagnosis Date    Hyperlipidemia      Past Surgical History:   Procedure Laterality Date    BONE MARROW BIOPSY Right 3/5/2025    Procedure: Biopsy-bone marrow;  Surgeon: Kevin Hemphill MD;  Location: [facility] ([address] [place] [address]);  Service: Oncology;  Laterality: Right;    CHOLECYSTECTOMY      heart stint      KNEE SURGERY      left    PLACEMENT OF DUAL-LUMEN VASCULAR CATHETER Right 3/28/2025    Procedure: INSERTION-CATHETER-MONA: Double Lumen, Bard 14.5 Fr. Hemosplit Cath, Model# [id number], Right vs Left;  Surgeon: Migule Lawson MD;  Location: [facility] OR [address] [place] [address];  Service: General;  Laterality: Right;    SHOULDER SURGERY      SPINE SURGERY       Family History       Problem Relation (Age of Onset)    Heart disease Father    No Known Problems Mother          Tobacco Use    Smoking status: Never    Smokeless tobacco: Never   Substance and Sexual Activity    Alcohol use: Yes     Comment: occ    Drug use: Never    Sexual activity: Not on file       Review of Systems   Constitutional:  Negative for chills and fever.   HENT:  Positive for congestion. Negative for nosebleeds.    Eyes:  Negative for photophobia and visual disturbance.   Respiratory:  Negative for shortness of breath and wheezing.    Cardiovascular:  Negative for chest pain and palpitations.    Gastrointestinal:  Negative for nausea and vomiting.   Genitourinary:  Negative for dysuria, frequency and hematuria.   Musculoskeletal:  Negative for joint swelling and myalgias.   Neurological:  Negative for dizziness and headaches.   Psychiatric/Behavioral:  Negative for agitation and confusion.      Objective:     Vital Signs (Most Recent):    Vital Signs (24h Range):  BP: ()/()   Arterial Line BP: ()/()         There is no height or weight on file to calculate BMI.  There is no height or weight on file to calculate BSA.    ECOG SCORE           [unfilled]    Lines/Drains/Airways       Central Venous Catheter Line  Duration                  Hemodialysis Catheter 03/28/25 0747 right internal jugular 7 days                     Physical Exam  Constitutional:       Appearance: Normal appearance.   HENT:      Head: Normocephalic and atraumatic.   Eyes:      Extraocular Movements: Extraocular movements intact.      Pupils: Pupils are equal, round, and reactive to light.   Cardiovascular:      Rate and Rhythm: Normal rate and regular rhythm.   Pulmonary:      Effort: Pulmonary effort is normal.      Breath sounds: Normal breath sounds.   Abdominal:      General: Abdomen is flat.      Palpations: Abdomen is soft.   Musculoskeletal:         General: No swelling. Normal range of motion.      Cervical back: Normal range of motion and neck supple.   Skin:     General: Skin is warm and dry.   Neurological:      General: No focal deficit present.      Mental Status: He is alert and oriented to person, place, and time.   Psychiatric:         Mood and Affect: Mood normal.         Behavior: Behavior normal.            Significant Labs:   Lab Results   Component Value Date    WBC 10.35 04/04/2025    HGB 12.4 (L) 04/04/2025    HCT 38.3 (L) 04/04/2025    MCV 91 04/04/2025    PLT 83 (L) 04/04/2025       CMP  Sodium   Date Value Ref Range Status   04/04/2025 139 136 - 145 mmol/L Final   03/10/2025 136 136 - 145 mmol/L Final      Potassium   Date Value Ref Range Status   04/04/2025 3.9 3.5 - 5.1 mmol/L Final   03/10/2025 3.5 3.5 - 5.1 mmol/L Final     Chloride   Date Value Ref Range Status   04/04/2025 105 95 - 110 mmol/L Final   03/10/2025 102 95 - 110 mmol/L Final     CO2   Date Value Ref Range Status   04/04/2025 26 23 - 29 mmol/L Final   03/10/2025 27 23 - 29 mmol/L Final     Glucose   Date Value Ref Range Status   03/10/2025 101 70 - 110 mg/dL Final     BUN   Date Value Ref Range Status   04/04/2025 13 8 - 23 mg/dL Final     Creatinine   Date Value Ref Range Status   04/04/2025 0.7 0.5 - 1.4 mg/dL Final     Calcium   Date Value Ref Range Status   04/04/2025 9.3 8.7 - 10.5 mg/dL Final   03/10/2025 9.4 8.7 - 10.5 mg/dL Final     Total Protein   Date Value Ref Range Status   03/10/2025 6.8 6.0 - 8.4 g/dL Final     Albumin   Date Value Ref Range Status   04/04/2025 3.6 3.5 - 5.2 g/dL Final   03/10/2025 3.7 3.5 - 5.2 g/dL Final     Total Bilirubin   Date Value Ref Range Status   03/10/2025 0.3 0.1 - 1.0 mg/dL Final     Comment:     For infants and newborns, interpretation of results should be based  on gestational age, weight and in agreement with clinical  observations.    Premature Infant recommended reference ranges:  Up to 24 hours.............<8.0 mg/dL  Up to 48 hours............<12.0 mg/dL  3-5 days..................<15.0 mg/dL  6-29 days.................<15.0 mg/dL       Bilirubin Total   Date Value Ref Range Status   04/04/2025 0.3 0.1 - 1.0 mg/dL Final     Comment:     For infants and newborns, interpretation of results should be based   on gestational age, weight and in agreement with clinical   observations.    Premature Infant recommended reference ranges:   0-24 hours:  <8.0 mg/dL   24-48 hours: <12.0 mg/dL   3-5 days:    <15.0 mg/dL   6-29 days:   <15.0 mg/dL     Alkaline Phosphatase   Date Value Ref Range Status   03/10/2025 84 40 - 150 U/L Final     ALP   Date Value Ref Range Status   04/04/2025 190 (H) 40 - 150 unit/L  Final     AST   Date Value Ref Range Status   04/04/2025 24 11 - 45 unit/L Final   03/10/2025 27 10 - 40 U/L Final     ALT   Date Value Ref Range Status   04/04/2025 36 10 - 44 unit/L Final   03/10/2025 48 (H) 10 - 44 U/L Final     Anion Gap   Date Value Ref Range Status   04/04/2025 8 8 - 16 mmol/L Final     eGFR   Date Value Ref Range Status   04/04/2025 >60 >60 mL/min/1.73/m2 Final     Comment:     Estimated GFR calculated using the CKD-EPI creatinine (2021) equation.   03/10/2025 >60.0 >60 mL/min/1.73 m^2 Final         Diagnostic Results:  None    Assessment/Plan:     Neuro  Peripheral neuropathy  - continue home Lyrica    Cardiac/Vascular  Hyperlipidemia  - hold home crestor while inpatient     Immunology/Multi System  Immunosuppressed due to chemotherapy  - will start ppx levaquin and diflucan   - on ppx Valtrex at home, no reported history of shingles. Will transition to ppx acyclovir.     Hematology  Hypercoagulable state  - on daily ASA at home, will hold while inpatient  - awaiting admission platelet count, if >50k will start ppx lovenox    Oncology  * Stem cell transplant candidate  Patient of Dr. Hemphill  - Coordinator: Xochilt Diaz  - Conditioning Regimen: Melphalan 200 mg/m2  - Cell Dose: 5-10 x 10^6 CD34/kg  - Maintenance: Korina+Rev  - Caregiver: Partner   - Housing: [facility]   Admitting today for Jennifer 200 Auto, day -2  Stem cell infusion scheduled for Tuesday, 4/08/25 at 13:30. Patient will be receiving 5 bags with a total CD34 dose of 4.39 x10^6/kg.    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  SPEP with 0.16 g/dL and 0.12 g/dL. FLCs normal.   PET/CT (3/7/25) - no new hypermetabolic lesions.        Endocrine  Hypothyroid  - continue home synthroid    Obesity (BMI 30.0-34.9)  There is no height or weight on file to calculate BMI. Morbid obesity complicates all aspects of disease management from diagnostic modalities to treatment. Weight loss encouraged and health benefits explained to patient.          VTE Risk Mitigation (From admission, onward)      None            Miguel Alejandre DO  Hematology/Oncology Fellow, PGY-IV  [facility] [place]           [1]   No medications prior to admission.

## 2025-04-07 DIAGNOSIS — C90.00 MULTIPLE MYELOMA NOT HAVING ACHIEVED REMISSION: Primary | ICD-10-CM

## 2025-04-07 PROBLEM — R03.0 ELEVATED BP WITHOUT DIAGNOSIS OF HYPERTENSION: Status: ACTIVE | Noted: 2025-04-07

## 2025-04-07 PROBLEM — Z91.89 AT RISK FOR MALNUTRITION: Status: ACTIVE | Noted: 2025-04-07

## 2025-04-07 PROBLEM — Z94.84 IMMUNOCOMPROMISED STATE ASSOCIATED WITH STEM CELL TRANSPLANT: Status: ACTIVE | Noted: 2025-04-07

## 2025-04-07 PROBLEM — D84.822 IMMUNOCOMPROMISED STATE ASSOCIATED WITH STEM CELL TRANSPLANT: Status: ACTIVE | Noted: 2025-04-07

## 2025-04-07 PROBLEM — R53.0 NEOPLASTIC (MALIGNANT) RELATED FATIGUE: Status: ACTIVE | Noted: 2025-04-07

## 2025-04-07 LAB
ABSOLUTE EOSINOPHIL (OHS): 0.33 K/UL
ABSOLUTE MONOCYTE (OHS): 0.84 K/UL (ref 0.3–1)
ABSOLUTE NEUTROPHIL COUNT (OHS): 5.89 K/UL (ref 1.8–7.7)
ALBUMIN SERPL BCP-MCNC: 3.2 G/DL (ref 3.5–5.2)
ALP SERPL-CCNC: 114 UNIT/L (ref 40–150)
ALT SERPL W/O P-5'-P-CCNC: 27 UNIT/L (ref 10–44)
ANION GAP (OHS): 8 MMOL/L (ref 8–16)
AST SERPL-CCNC: 18 UNIT/L (ref 11–45)
BASOPHILS # BLD AUTO: 0.03 K/UL
BASOPHILS NFR BLD AUTO: 0.4 %
BILIRUB SERPL-MCNC: 0.2 MG/DL (ref 0.1–1)
BUN SERPL-MCNC: 15 MG/DL (ref 8–23)
CALCIUM SERPL-MCNC: 9.1 MG/DL (ref 8.7–10.5)
CHLORIDE SERPL-SCNC: 107 MMOL/L (ref 95–110)
CO2 SERPL-SCNC: 24 MMOL/L (ref 23–29)
CREAT SERPL-MCNC: 0.7 MG/DL (ref 0.5–1.4)
ERYTHROCYTE [DISTWIDTH] IN BLOOD BY AUTOMATED COUNT: 18.3 % (ref 11.5–14.5)
GFR SERPLBLD CREATININE-BSD FMLA CKD-EPI: >60 ML/MIN/1.73/M2
GLUCOSE SERPL-MCNC: 120 MG/DL (ref 70–110)
HCT VFR BLD AUTO: 34.5 % (ref 40–54)
HGB BLD-MCNC: 11.3 GM/DL (ref 14–18)
IMM GRANULOCYTES # BLD AUTO: 0.06 K/UL (ref 0–0.04)
IMM GRANULOCYTES NFR BLD AUTO: 0.7 % (ref 0–0.5)
LYMPHOCYTES # BLD AUTO: 1.16 K/UL (ref 1–4.8)
MAGNESIUM SERPL-MCNC: 1.9 MG/DL (ref 1.6–2.6)
MCH RBC QN AUTO: 29.5 PG (ref 27–31)
MCHC RBC AUTO-ENTMCNC: 32.8 G/DL (ref 32–36)
MCV RBC AUTO: 90 FL (ref 82–98)
NUCLEATED RBC (/100WBC) (OHS): 0 /100 WBC
PHOSPHATE SERPL-MCNC: 3.6 MG/DL (ref 2.7–4.5)
PLATELET # BLD AUTO: 86 K/UL (ref 150–450)
PMV BLD AUTO: 11.2 FL (ref 9.2–12.9)
POTASSIUM SERPL-SCNC: 3.8 MMOL/L (ref 3.5–5.1)
PROT SERPL-MCNC: 5.7 GM/DL (ref 6–8.4)
RBC # BLD AUTO: 3.83 M/UL (ref 4.6–6.2)
RELATIVE EOSINOPHIL (OHS): 4 %
RELATIVE LYMPHOCYTE (OHS): 14 % (ref 18–48)
RELATIVE MONOCYTE (OHS): 10.1 % (ref 4–15)
RELATIVE NEUTROPHIL (OHS): 70.8 % (ref 38–73)
SODIUM SERPL-SCNC: 139 MMOL/L (ref 136–145)
WBC # BLD AUTO: 8.31 K/UL (ref 3.9–12.7)

## 2025-04-07 PROCEDURE — 25000003 PHARM REV CODE 250: Performed by: INTERNAL MEDICINE

## 2025-04-07 PROCEDURE — 83735 ASSAY OF MAGNESIUM: CPT | Performed by: NURSE PRACTITIONER

## 2025-04-07 PROCEDURE — 99223 1ST HOSP IP/OBS HIGH 75: CPT | Mod: ,,, | Performed by: INTERNAL MEDICINE

## 2025-04-07 PROCEDURE — 20600001 HC STEP DOWN PRIVATE ROOM

## 2025-04-07 PROCEDURE — 94799 UNLISTED PULMONARY SVC/PX: CPT

## 2025-04-07 PROCEDURE — 97530 THERAPEUTIC ACTIVITIES: CPT

## 2025-04-07 PROCEDURE — 97161 PT EVAL LOW COMPLEX 20 MIN: CPT

## 2025-04-07 PROCEDURE — 97165 OT EVAL LOW COMPLEX 30 MIN: CPT

## 2025-04-07 PROCEDURE — 85025 COMPLETE CBC W/AUTO DIFF WBC: CPT | Performed by: INTERNAL MEDICINE

## 2025-04-07 PROCEDURE — 87040 BLOOD CULTURE FOR BACTERIA: CPT | Performed by: INTERNAL MEDICINE

## 2025-04-07 PROCEDURE — 36415 COLL VENOUS BLD VENIPUNCTURE: CPT | Performed by: INTERNAL MEDICINE

## 2025-04-07 PROCEDURE — 80053 COMPREHEN METABOLIC PANEL: CPT | Performed by: NURSE PRACTITIONER

## 2025-04-07 PROCEDURE — 84100 ASSAY OF PHOSPHORUS: CPT | Performed by: NURSE PRACTITIONER

## 2025-04-07 PROCEDURE — 63600175 PHARM REV CODE 636 W HCPCS: Performed by: INTERNAL MEDICINE

## 2025-04-07 PROCEDURE — 63600175 PHARM REV CODE 636 W HCPCS: Performed by: NURSE PRACTITIONER

## 2025-04-07 PROCEDURE — 25000003 PHARM REV CODE 250: Performed by: NURSE PRACTITIONER

## 2025-04-07 PROCEDURE — 30243Y0 TRANSFUSION OF AUTOLOGOUS HEMATOPOIETIC STEM CELLS INTO CENTRAL VEIN, PERCUTANEOUS APPROACH: ICD-10-PCS | Performed by: INTERNAL MEDICINE

## 2025-04-07 PROCEDURE — 94761 N-INVAS EAR/PLS OXIMETRY MLT: CPT

## 2025-04-07 RX ORDER — DIPHENHYDRAMINE HYDROCHLORIDE 50 MG/ML
50 INJECTION, SOLUTION INTRAMUSCULAR; INTRAVENOUS ONCE AS NEEDED
Status: DISCONTINUED | OUTPATIENT
Start: 2025-04-08 | End: 2025-04-22 | Stop reason: HOSPADM

## 2025-04-07 RX ORDER — NITROGLYCERIN 0.4 MG/1
0.4 TABLET SUBLINGUAL ONCE AS NEEDED
Status: COMPLETED | OUTPATIENT
Start: 2025-04-08 | End: 2025-04-08

## 2025-04-07 RX ORDER — EPINEPHRINE 1 MG/ML
0.5 INJECTION, SOLUTION, CONCENTRATE INTRAVENOUS ONCE AS NEEDED
Status: COMPLETED | OUTPATIENT
Start: 2025-04-08 | End: 2025-04-08

## 2025-04-07 RX ORDER — PANTOPRAZOLE SODIUM 40 MG/10ML
80 INJECTION, POWDER, LYOPHILIZED, FOR SOLUTION INTRAVENOUS ONCE
Status: DISCONTINUED | OUTPATIENT
Start: 2025-04-07 | End: 2025-04-07

## 2025-04-07 RX ORDER — FUROSEMIDE 10 MG/ML
100 INJECTION INTRAMUSCULAR; INTRAVENOUS ONCE AS NEEDED
Status: COMPLETED | OUTPATIENT
Start: 2025-04-08 | End: 2025-04-08

## 2025-04-07 RX ORDER — MUPIROCIN 20 MG/G
OINTMENT TOPICAL 2 TIMES DAILY
Status: COMPLETED | OUTPATIENT
Start: 2025-04-07 | End: 2025-04-11

## 2025-04-07 RX ORDER — CLONIDINE HYDROCHLORIDE 0.1 MG/1
0.1 TABLET ORAL ONCE AS NEEDED
Status: COMPLETED | OUTPATIENT
Start: 2025-04-08 | End: 2025-04-08

## 2025-04-07 RX ORDER — HYDRALAZINE HYDROCHLORIDE 25 MG/1
25 TABLET, FILM COATED ORAL EVERY 8 HOURS PRN
Status: DISCONTINUED | OUTPATIENT
Start: 2025-04-07 | End: 2025-04-22 | Stop reason: HOSPADM

## 2025-04-07 RX ORDER — PANTOPRAZOLE SODIUM 40 MG/1
40 TABLET, DELAYED RELEASE ORAL DAILY
Status: DISCONTINUED | OUTPATIENT
Start: 2025-04-07 | End: 2025-04-22 | Stop reason: HOSPADM

## 2025-04-07 RX ORDER — SODIUM CHLORIDE AND POTASSIUM CHLORIDE 150; 900 MG/100ML; MG/100ML
INJECTION, SOLUTION INTRAVENOUS CONTINUOUS
Status: DISCONTINUED | OUTPATIENT
Start: 2025-04-08 | End: 2025-04-09

## 2025-04-07 RX ORDER — MEPERIDINE HYDROCHLORIDE 50 MG/ML
50 INJECTION INTRAMUSCULAR; INTRAVENOUS; SUBCUTANEOUS ONCE AS NEEDED
Refills: 0 | Status: COMPLETED | OUTPATIENT
Start: 2025-04-08 | End: 2025-04-08

## 2025-04-07 RX ORDER — DIPHENHYDRAMINE HYDROCHLORIDE 50 MG/ML
50 INJECTION, SOLUTION INTRAMUSCULAR; INTRAVENOUS ONCE
Status: COMPLETED | OUTPATIENT
Start: 2025-04-08 | End: 2025-04-08

## 2025-04-07 RX ORDER — LORAZEPAM 2 MG/ML
1 INJECTION INTRAMUSCULAR ONCE
Status: COMPLETED | OUTPATIENT
Start: 2025-04-08 | End: 2025-04-08

## 2025-04-07 RX ORDER — PANTOPRAZOLE SODIUM 40 MG/10ML
40 INJECTION, POWDER, LYOPHILIZED, FOR SOLUTION INTRAVENOUS 2 TIMES DAILY
Status: DISCONTINUED | OUTPATIENT
Start: 2025-04-07 | End: 2025-04-07

## 2025-04-07 RX ORDER — ENOXAPARIN SODIUM 100 MG/ML
40 INJECTION SUBCUTANEOUS EVERY 24 HOURS
Status: DISCONTINUED | OUTPATIENT
Start: 2025-04-07 | End: 2025-04-16

## 2025-04-07 RX ADMIN — Medication 1 DOSE: at 09:04

## 2025-04-07 RX ADMIN — POTASSIUM CHLORIDE: 2 INJECTION, SOLUTION, CONCENTRATE INTRAVENOUS at 12:04

## 2025-04-07 RX ADMIN — MUPIROCIN: 20 OINTMENT TOPICAL at 08:04

## 2025-04-07 RX ADMIN — Medication 1 DOSE: at 08:04

## 2025-04-07 RX ADMIN — DULOXETINE HYDROCHLORIDE 20 MG: 20 CAPSULE, DELAYED RELEASE ORAL at 09:04

## 2025-04-07 RX ADMIN — Medication 1 DOSE: at 12:04

## 2025-04-07 RX ADMIN — MELPHALAN 437.5 MG: 50 INJECTION, POWDER, LYOPHILIZED, FOR SOLUTION INTRAVENOUS at 09:04

## 2025-04-07 RX ADMIN — Medication 400 MG: at 09:04

## 2025-04-07 RX ADMIN — PANTOPRAZOLE SODIUM 40 MG: 40 TABLET, DELAYED RELEASE ORAL at 09:04

## 2025-04-07 RX ADMIN — Medication 400 MG: at 06:04

## 2025-04-07 RX ADMIN — DEXAMETHASONE 12 MG: 4 TABLET ORAL at 09:04

## 2025-04-07 RX ADMIN — ONDANSETRON 8 MG: 8 TABLET, ORALLY DISINTEGRATING ORAL at 08:04

## 2025-04-07 RX ADMIN — POTASSIUM CHLORIDE 20 MEQ: 1500 TABLET, EXTENDED RELEASE ORAL at 06:04

## 2025-04-07 RX ADMIN — POTASSIUM CHLORIDE: 2 INJECTION, SOLUTION, CONCENTRATE INTRAVENOUS at 02:04

## 2025-04-07 RX ADMIN — ACYCLOVIR 800 MG: 200 CAPSULE ORAL at 08:04

## 2025-04-07 RX ADMIN — Medication 1 DOSE: at 05:04

## 2025-04-07 RX ADMIN — ONDANSETRON 8 MG: 8 TABLET, ORALLY DISINTEGRATING ORAL at 12:04

## 2025-04-07 RX ADMIN — OLANZAPINE 5 MG: 5 TABLET, FILM COATED ORAL at 09:04

## 2025-04-07 RX ADMIN — LEVOFLOXACIN 500 MG: 500 TABLET, FILM COATED ORAL at 09:04

## 2025-04-07 RX ADMIN — PREGABALIN 75 MG: 75 CAPSULE ORAL at 08:04

## 2025-04-07 RX ADMIN — FLUCONAZOLE 400 MG: 200 TABLET ORAL at 09:04

## 2025-04-07 RX ADMIN — MUPIROCIN: 20 OINTMENT TOPICAL at 11:04

## 2025-04-07 RX ADMIN — ACYCLOVIR 800 MG: 200 CAPSULE ORAL at 09:04

## 2025-04-07 RX ADMIN — ENOXAPARIN SODIUM 40 MG: 40 INJECTION SUBCUTANEOUS at 05:04

## 2025-04-07 RX ADMIN — ONDANSETRON 8 MG: 8 TABLET, ORALLY DISINTEGRATING ORAL at 06:04

## 2025-04-07 RX ADMIN — LEVOTHYROXINE SODIUM 88 MCG: 88 TABLET ORAL at 06:04

## 2025-04-07 RX ADMIN — OLANZAPINE 5 MG: 5 TABLET, FILM COATED ORAL at 08:04

## 2025-04-07 NOTE — PLAN OF CARE
Problem: Occupational Therapy  Goal: Occupational Therapy Goal  Description: Goals to be met by: 04-21-25     Patient will increase functional independence with ADLs by performing:    LE Dressing with [place].  Grooming while standing at sink with [place].  Toileting from toilet with [place] for hygiene and clothing management.   Step transfer with [place]  Toilet transfer to toilet with [place].  Pt. To be independent with HEP to maintain level of endurance    Outcome: Progressing

## 2025-04-07 NOTE — PROGRESS NOTES
Onsite RD will provide pt with high calorie, high protein diet education, and food safety education for bone marrow transplant 4/7.     Pt endorses 50% intake of meals. Like Boost chocolate.       Thanks,Melodie Berg RD, LDN

## 2025-04-07 NOTE — CARE UPDATE
I have reviewed the chart of Mitchel Joya who is hospitalized for the following:    Active Hospital Problems    Diagnosis    *Stem cell transplant candidate    Elevated BP without diagnosis of hypertension    At risk for malnutrition     RD consulted      Neoplastic (malignant) related fatigue     PT/OT consulted      Immunocompromised state associated with stem cell transplant     Multiple myeloma not having achieved remission   Day -1 Jennifer 200 auto      Peripheral neuropathy    Hyperlipidemia    Hypothyroid    Hypercoagulable state    Immunosuppressed due to chemotherapy    Multiple myeloma not having achieved remission    Obesity (BMI 30.0-34.9)        GRIFFIN STEVE  [employer] AXEL

## 2025-04-07 NOTE — PLAN OF CARE
Problem: Physical Therapy  Goal: Physical Therapy Goal  Description: Goals to be met by:      Patient will increase functional independence with mobility by performin. Gait  x 600 feet with Modified [place] using no AD.   2. Lower extremity exercise program x15 reps per handout, with independence    Outcome: Progressing   Evaluation completed, initiated plan of care.   Leigh Rodriguez, PT  2025

## 2025-04-07 NOTE — PLAN OF CARE
AAOx4. No complaints this shift. POC reviewed with patient; understanding verbalized.NS with kcl infusing 75 ml/hr .Chemo administered as per order.Blood pressure in higher side NP Dulce Maria, notified PRN hydralazine ordered. Pt. with nonskid footwear on, bed in lowest position, and locked with bed rails up x2.  Pt. instructed to call prior to getting OOB.  Pt. has call light and personal items within reach. Patient ambulates in room independently. Up to toilet. Wife at bedside. VSS and afebrile this shift. All questions and concerns addressed at this time.

## 2025-04-07 NOTE — ASSESSMENT & PLAN NOTE
- BP consistently high normal, not on antihypertensives at home   - will monitor closely, low threshold to start antihypertensives

## 2025-04-07 NOTE — HOSPITAL COURSE
"04/07/2025 admitted yesterday for Jennifer 200 Autologous stem cell transplant for IgG multiple myeloma. BP high normal. Reports peripheral neuropathy improving with lyrica. Has no other complaints today.   04/08/2025 Day 0 Jennifer 200 Auto for IgG Multiple Myeloma. Received 4.39x10^6 CD34 stem cells (5 bags) at 11:30 am and tolerated without difficulty. BP improved today. No complaints today except feeling "jittery" prior to transplant.   04/09/2025: Day +1 from a Jennifer 200 auto SCT for MM. Received stem cells yesterday. Tolerated transplant well. Remains afebrile. VSS. Weight up 6.5 lbs from admission. Received large volume of IV fluids with transplant. Good oral intake, so stopping IVF. Does not appear volume overloaded, so will defer diuresis.  04/10/2025: Day +2 from a Jennifer 200 auto SCT for MM. Remains afebrile. VSS. Denies GI toxicities at this time. No mucositis. Oral intake remains good. Will resume IVF with reduced oral fluid intake or with fluid losses through diarrhea or emesis. Remains active.   04/11/2025: Day +3 from a Jennifer 200 auto SCT for MM. Remains afebrile. VSS. Blood counts dropping predictably. Having nausea yesterday but no emesis. PRN Zofran started. Can schedule antiemetics if indicated. Still no diarrhea or mucositis.  04/12/2025 D+4 from [id number] AutoSCT for standard-risk MM. No overnight events. VSS. Endorses mild fatigue, one liquid BM yesterday - none today so far. Wife at bedside updated of anticipated clinical stay.   04/13/2025 D+5 from Oji790 AutoSCT for standard-risk MM. No overnight events. VSS. Overall tolerating well, having BM but none are liquid.   04/14/2025: Day +6 from a Jennifer 200 auto SCT for MM. Remains afebrile. VSS. Having infrequent c-diff neg diarrhea. PRN imodium available. Oral intake remains good. No oral mucositis. Will resume IVF with worsening oral fluid intake or increased diarrhea.  04/15/2025 Day +7 from a Jennifer 200 auto SCT for MM. Multiple GI complaints. Reports abdominal " cramping, Bentyl started. Vomited this am, changed Zofran to ATC. Continues with diarrhea, BMx5. Will schedule Imodium and added lomotil PRN. VSS, BP improved. Stopping Amlodipine.   04/16/2025 Day +8 from a Jennifer 200 auto SCT for MM. Reports nausea improved with ATC Zofran. BMx7. Hypotensive overnight. Will restart IVF. Stopped lovenox as platelets <50k.   04/17/2025 Day +9 from a Jennifer 200 auto SCT for MM. Nausea controlled. BM x 7, will change lomotil to ATC. BP improved with IVF. Reports slept well with addition of Mirtazapine.  04/18/2025 Day +10 from a Jennifer 200 auto SCT for MM. Nausea fairly controlled but has had multipel bowel movmements (+7) despite imodium and lomotil scheduled. Adding TOO. Appetite is still off.  04/19/2025 Day +11 from a Jennifer 200 auto SCT for MM. Ongoing diarrhea and supporting with loperamide, lomotil, and TOO prn. ANC 20 today.  04/20/2025 Day +12 from a Jennifer 200 auto SCT for MM. Ongoing diarrhea however improved and down to 3 episodes. Feels better overall.  today.  04/21/2025 Day +13 from a Jennifer 200 auto SCT for MM. Engrafted today with ANC 2345. Afebrile since 4/18, infectious work-up negative. Stopping Neupogen, cefepime and diflucan today. Will change ATC nausea meds and antidiarrheals to PRN and stop IVF in anticipation of potential discharge tomorrow.   04/22/2025 Day +14 from a Jennifer 200 auto SCT for MM. Day 2 of engraftment. Denies nausea, tolerated po. Reports diarrhea improving. Will discharge to [facility] today after Vascath removal and discharge teaching. Has BMT clinic follow-up on 4/25.

## 2025-04-07 NOTE — PT/OT/SLP EVAL
Occupational Therapy   Evaluation    Name: Mitchel Joya  MRN: [id number]  Admitting Diagnosis: Stem cell transplant candidate  Recent Surgery: * No surgery found *      Recommendations:     Discharge Recommendations: No Therapy Indicated  Discharge Equipment Recommendations:  none  Barriers to discharge:  None    Assessment:     Mitchel Joya is a 64 y.o. male with a medical diagnosis of Stem cell transplant candidate.  He presents with high level of functioning at this time with ADL task performance and mobility. . Performance deficits affecting function: other (comment) (potential for decline).      Rehab Prognosis: Good; patient would benefit from acute skilled OT services to address these deficits and reach maximum level of function.       Plan:     Patient to be seen 1 x/week to address the above listed problems via self-care/home management, therapeutic activities, therapeutic exercises  Plan of Care Expires: 04/21/25  Plan of Care Reviewed with: patient    Subjective     Chief Complaint: Not sleeping well last night  Patient/Family Comments/goals: to do well with transplant    Occupational Profile:  Living Environment: pt. Resides with spouse in ss house with no steps. Pt. Has both a WIS and a Tub shower. No seat or GB  Previous level of function: pt. Completely independent with ADL tasks and mobility; + driving, works full-time  Roles and Routines: caretaker of self, spouse, employee, community dweller  Equipment Used at Home: none  Assistance upon Discharge: spouse also works    Pain/Comfort:  Pain Rating 1: 0/10  Pain Rating Post-Intervention 1: 0/10    Patients cultural, spiritual, Confucianism conflicts given the current situation: no    Objective:     Communicated with: nurse prior to session.  Patient found supine with  (port a cath) upon OT entry to room.    General Precautions: Standard, fall  Orthopedic Precautions: N/A  Braces: N/A  Respiratory Status: Room air    Occupational Performance:    Bed  Mobility:    Patient completed Supine to Sit with independence    Functional Mobility/Transfers:  Patient completed Sit <> Stand Transfer with independence  with  no assistive device   Patient completed Toilet Transfer Step Transfer technique with independence with  no AD  Functional Mobility: pt. Performed functional mobility in room without an AD and independence    Activities of Daily Living:  Grooming: independence    Toileting: independence      Cognitive/Visual Perceptual:  Cognitive/Psychosocial Skills:     -       Oriented to: Person, Place, Time, and Situation   -       Follows Commands/attention:Follows multistep  commands  -       Communication: clear/fluent  -       Memory: No Deficits noted  -       Safety awareness/insight to disability: intact   -       Mood/Affect/Coping skills/emotional control: Appropriate to situation  Visual/Perceptual:      -Intact wears glasses    Physical Exam:  Balance: -       sit: good; stand: good  Postural examination/scapula alignment:    -       No postural abnormalities identified  Upper Extremity Range of Motion:     -       Right Upper Extremity: WNL  -       Left Upper Extremity: WNL   Strength:    -       Right Upper Extremity: WNL  -       Left Upper Extremity: WNL    AMPAC 6 Click ADL:  AMPAC Total Score: 23    Treatment & Education:  Pt. Educated on role of OT and pOC    Patient left supine with all lines intact and call button in reach    GOALS:   Multidisciplinary Problems       Occupational Therapy Goals          Problem: Occupational Therapy    Goal Priority Disciplines Outcome Interventions   Occupational Therapy Goal     OT, PT/OT Progressing    Description: Goals to be met by: 04-21-25     Patient will increase functional independence with ADLs by performing:    LE Dressing with [place].  Grooming while standing at sink with [place].  Toileting from toilet with [place] for hygiene and clothing management.   Step transfer with  [place]  Toilet transfer to toilet with [place].  Pt. To be independent with HEP to maintain level of endurance                         DME Justifications:  No DME recommended requiring DME justifications    History:     Past Medical History:   Diagnosis Date    Coronary artery disease     Hyperlipidemia          Past Surgical History:   Procedure Laterality Date    BONE MARROW BIOPSY Right 3/5/2025    Procedure: Biopsy-bone marrow;  Surgeon: Kevin Hemphill MD;  Location: [facility] ([address] [place] [address]);  Service: Oncology;  Laterality: Right;    CHOLECYSTECTOMY      heart stint      KNEE SURGERY      left    PLACEMENT OF DUAL-LUMEN VASCULAR CATHETER Right 3/28/2025    Procedure: INSERTION-CATHETER-MONA: Double Lumen, Bard 14.5 Fr. Hemosplit Cath, Model# [id number], Right vs Left;  Surgeon: Miguel Lawson MD;  Location: [address];  Service: General;  Laterality: Right;    SHOULDER SURGERY      SPINE SURGERY         Time Tracking:     OT Date of Treatment: 04/07/25  OT Start Time: 0755  OT Stop Time: 0807  OT Total Time (min): 12 min    Billable Minutes:Evaluation 12    4/7/2025

## 2025-04-07 NOTE — PLAN OF CARE
AAOx4. No complaints this shift. POC reviewed with patient; understanding verbalized. Pt. with nonskid footwear on, bed in lowest position, and locked with bed rails up x2.  Pt. instructed to call prior to getting OOB.  Pt. has call light and personal items within reach. Patient ambulates in room independently. Up to toilet. Wife at bedside. VSS and afebrile this shift. All questions and concerns addressed at this time.

## 2025-04-07 NOTE — ASSESSMENT & PLAN NOTE
- will start ppx levaquin and diflucan   - on ppx Valtrex at home, no reported history of shingles. Transitioned to ppx acyclovir.

## 2025-04-07 NOTE — ASSESSMENT & PLAN NOTE
Body mass index is 32.59 kg/m². Morbid obesity complicates all aspects of disease management from diagnostic modalities to treatment. Weight loss encouraged and health benefits explained to patient.

## 2025-04-07 NOTE — PROGRESS NOTES
[facility] ([facility])  Hematology  Bone Marrow Transplant  Progress Note    Patient Name: Mitchel Joya  Admission Date: 4/6/2025  Hospital Length of Stay: 1 days  Code Status: Full Code    Subjective:     Interval History: admitted yesterday for Jennifer 200 Autologous stem cell transplant for IgG multiple myeloma. BP high normal. Reports peripheral neuropathy improving with lyrica. Has no other complaints today.     Objective:     Vital Signs (Most Recent):  Temp: 97.4 °F (36.3 °C) (04/07/25 1128)  Pulse: 68 (04/07/25 1128)  Resp: 18 (04/07/25 1128)  BP: (!) 187/92 (04/07/25 1128)  SpO2: 99 % (04/07/25 1128) Vital Signs (24h Range):  Temp:  [97.4 °F (36.3 °C)-98.2 °F (36.8 °C)] 97.4 °F (36.3 °C)  Pulse:  [66-91] 68  Resp:  [18-20] 18  SpO2:  [93 %-99 %] 99 %  BP: (130-187)/(76-97) 187/92     Weight: 100.1 kg (220 lb 10.9 oz)  Body mass index is 32.59 kg/m².  Body surface area is 2.21 meters squared.      Intake/Output - Last 3 Shifts         04/05 0700  04/06 0659 04/06 0700  04/07 0659 04/07 0700  04/08 0659    P.O.   358    I.V. (mL/kg)  427.9 (4.3)     Total Intake(mL/kg)  427.9 (4.3) 358 (3.6)    Urine (mL/kg/hr)   800 (1.2)    Stool   0    Total Output   800    Net  +427.9 -442           Stool Occurrence   0 x             Physical Exam  Constitutional:       Appearance: Normal appearance. He is obese.   HENT:      Head: Normocephalic and atraumatic.   Eyes:      Extraocular Movements: Extraocular movements intact.      Pupils: Pupils are equal, round, and reactive to light.   Cardiovascular:      Rate and Rhythm: Normal rate and regular rhythm.   Pulmonary:      Effort: Pulmonary effort is normal.      Breath sounds: Normal breath sounds.   Abdominal:      General: Abdomen is flat.      Palpations: Abdomen is soft.   Musculoskeletal:         General: No swelling. Normal range of motion.      Cervical back: Normal range of motion and neck supple.   Skin:     General: Skin is warm and dry.      Comments:  Vascath intact with no redness or drainage    Neurological:      General: No focal deficit present.      Mental Status: He is alert and oriented to person, place, and time.   Psychiatric:         Mood and Affect: Mood normal.         Behavior: Behavior normal.            Significant Labs:   CBC:   Recent Labs   Lab 04/07/25  0147   WBC 8.31   HGB 11.3*   HCT 34.5*   PLT 86*    and CMP:   Recent Labs   Lab 04/07/25  0147      K 3.8      CO2 24   BUN 15   CREATININE 0.7   CALCIUM 9.1   ALBUMIN 3.2*   BILITOT 0.2   ALKPHOS 114   AST 18   ALT 27   ANIONGAP 8       Diagnostic Results:  None  Assessment/Plan:     * Stem cell transplant candidate  Patient of Dr. Hemphill  - Coordinator: Xochilt Diaz  - Conditioning Regimen: Melphalan 200 mg/m2  - Cell Dose: 5-10 x 10^6 CD34/kg  - Maintenance: Korina+Rev  - Caregiver: Partner   - Housing: [facility]   Admitting today for Jennifer 200 Auto, day -1  Stem cell infusion scheduled for Tuesday, 4/08/25 at 11:30. Patient will be receiving 5 bags with a total CD34 dose of 4.39 x10^6/kg.    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  SPEP with 0.16 g/dL and 0.12 g/dL. FLCs normal.   PET/CT (3/7/25) - no new hypermetabolic lesions.        Immunosuppressed due to chemotherapy  - will start ppx levaquin and diflucan   - on ppx Valtrex  at home, no reported history of shingles. Transitioned to ppx acyclovir.     Hypercoagulable state  - on daily ASA at home, will hold while inpatient  - ppx lovenox until platelets <50k    Peripheral neuropathy  - continue home Lyrica    Elevated BP without diagnosis of hypertension  - BP consistently high normal, not on antihypertensives at home   - will monitor closely, low threshold to start antihypertensives    Obesity (BMI 30.0-34.9)  Body mass index is 32.59 kg/m². Morbid obesity complicates all aspects of disease management from diagnostic modalities to treatment. Weight loss encouraged and health benefits explained to patient.      Hypothyroid  - continue home synthroid    Hyperlipidemia  - hold home crestor while inpatient         VTE Risk Mitigation (From admission, onward)           Ordered     enoxaparin injection 40 mg  Every 24 hours         04/07/25 1333     heparin, porcine (PF) 100 unit/mL injection flush 300 Units  As needed (PRN)         04/06/25 2212     heparin (porcine) injection 1,600 Units  As needed (PRN)         04/06/25 2203     IP VTE HIGH RISK PATIENT  Once         04/06/25 1824     Place sequential compression device  Until discontinued         04/06/25 1824                    Disposition: inpatient     Dulce Maria Saul NP  Bone Marrow Transplant  [facility] ([facility])

## 2025-04-07 NOTE — PT/OT/SLP EVAL
"Physical Therapy Evaluation    Patient Name:  Mitchel Joya   MRN:  [id number]    Recommendations:     Discharge Recommendations: No Therapy Indicated   Discharge Equipment Recommendations: none   Barriers to discharge: None    Assessment:     Mitchel Joya is a 64 y.o. male admitted with a medical diagnosis of Stem cell transplant candidate.  He presents with the following impairments/functional limitations:  (at risk for deconditioning). The patient ambulated 400' with no AD and independence.     Rehab Prognosis: Good; patient would benefit from acute skilled PT services to address these deficits and reach maximum level of function.    Recent Surgery: * No surgery found *      Plan:     During this hospitalization, patient to be seen 1 x/week to address the identified rehab impairments via therapeutic activities, gait training, therapeutic exercises and progress toward the following goals:    Plan of Care Expires:  05/07/25    Subjective     Chief Complaint: "I'm done with my chemo and chewing ice"  Patient/Family Comments/goals: maintain independence with mobility  Pain/Comfort:  Pain Rating 1: 0/10    Patients cultural, spiritual, Yazidism conflicts given the current situation: no    Living Environment:  The patient lives with his wife in [facility], [facility], [facility] and [employer] Works and drives.   Prior to admission, patients level of function was independent .  Equipment used at home: none (owns RW and SPC).  DME owned (not currently used): none.  Upon discharge, patient will have assistance from wife, family.    Objective:     Communicated with RN prior to session.  Patient found HOB elevated with  (port a cath)  upon PT entry to room.    General Precautions: Standard, fall  Orthopedic Precautions:N/A   Braces: N/A  Respiratory Status: Room air    Exams:    Cognitive Exam  Patient is A&O x4 and follows 100% of one -step commands    Fine Motor Coordination   -       WFL     Postural Exam Patient presented with the following " abnormalities:    -       Rounded shoulders  -       Forward head  -       Kyphosis  -       Posterior pelvic tilt  -        Sensation    -       Light touch mild peripheral neuropathy in YAZMIN toes   Skin Integrity/Edema     -       Skin integrity: visibly intact  -       Edema: NA   R LE ROM WFL   R LE Strength WFL   L LE ROM WFL   L LE Strength  WFL            Functional Mobility:    Bed Mobility  Supine to Sit on the R side: independent    Transfers Sit to Stand:  independent    Gait  Gait Distance: 400 ft with no AD  Assistance Level: supervision assistance   Description: mild decreased gait speed, mild decreased step length          AM-PAC 6 CLICK MOBILITY  Total Score:24       Treatment & Education:  Patient educated on:   -PT plan of care during hospitalization, PT to follow up 1 day a week, issue HEP to prevent functional decline  -Role of acute PT to promote mobility, re-assess mobility throughout course of treatment and hospitalization  -Patient encouraged to sit up in chair, use stationary bike, ambulate daily on BMT larry pending symptoms in order to maintain endurance/prevent decline  Patient verbalized agreement and understanding of education      Assisted patient with adjusting stationary bike length, oriented patient to bike controls. Dietary services arrived as patient was about to use bike.     Patient left ambulatory in room/larry with all lines intact and call button in reach.    GOALS:   Multidisciplinary Problems       Physical Therapy Goals          Problem: Physical Therapy    Goal Priority Disciplines Outcome Interventions   Physical Therapy Goal     PT, PT/OT Progressing    Description: Goals to be met by:      Patient will increase functional independence with mobility by performin. Gait  x 600 feet with Modified [place] using no AD.   2. Lower extremity exercise program x15 reps per handout, with independence                         DME Justifications:  No DME recommended  requiring DME justifications    History:     Past Medical History:   Diagnosis Date    Coronary artery disease     Hyperlipidemia        Past Surgical History:   Procedure Laterality Date    BONE MARROW BIOPSY Right 3/5/2025    Procedure: Biopsy-bone marrow;  Surgeon: Kevin Hemphill MD;  Location: [facility] ([address] [place] [address]);  Service: Oncology;  Laterality: Right;    CHOLECYSTECTOMY      heart stint      KNEE SURGERY      left    PLACEMENT OF DUAL-LUMEN VASCULAR CATHETER Right 3/28/2025    Procedure: INSERTION-CATHETER-MONA: Double Lumen, Bard 14.5 Fr. Hemosplit Cath, Model# [id number], Right vs Left;  Surgeon: Miguel Lawson MD;  Location: [facility] OR [address] [place] [address];  Service: General;  Laterality: Right;    SHOULDER SURGERY      SPINE SURGERY         Time Tracking:     PT Received On: 04/07/25  PT Start Time: 1438     PT Stop Time: 1454  PT Total Time (min): 16 min     Billable Minutes: Evaluation 8 and Therapeutic Activity 8      04/07/2025

## 2025-04-07 NOTE — ASSESSMENT & PLAN NOTE
Patient of Dr. Hemphill  - Coordinator: Xochilt Diaz  - Conditioning Regimen: Melphalan 200 mg/m2  - Cell Dose: 5-10 x 10^6 CD34/kg  - Maintenance: Korina+Rev  - Caregiver: Partner   - Housing: [place]   Admitting today for Jennifer 200 Auto, day -1  Stem cell infusion scheduled for Tuesday, 4/08/25 at 11:30. Patient will be receiving 5 bags with a total CD34 dose of 4.39 x10^6/kg.    Planned conditioning regimen:  Melphalan 200 on Day -1     Antimicrobial Prophylaxis:  Acyclovir starting on Day -1  Levofloxacin starting on Day -1  Fluconazole starting on Day -1  Bactrim starting on Day +30     Growth Factor Support:  Neupogen starting on Day +7

## 2025-04-07 NOTE — CONSULTS
[facility]claughlin - Oncology ([facility])  Adult Nutrition  Consult Note    SUMMARY     Recommendations    Continue adult regular diet as tolerated  Recommend boost plus TID if pts meal intake <75% x 48 hours  Provides additional ~1080kcal  Review bowel regimen    RD monitor wt, nutritional status, skin, and labs  Goals: meet % of EEN/EPN by next RD f/u    Communication of RD Recs: other (comment) (POC)    Nutrition Discharge Planning     Nutrition Discharge Planning: Other (comments)  Other (comments): RD will provideo education for high protein, high calorie, food safety    Assessment and Plan    Nutrition Problem  Inadequate oral intake     Related to (etiology):   Difficulty swallowing secondary to phlegm     Signs and Symptoms (as evidenced by):   Intake <70% of EEN     Interventions/Recommendations (treatment strategy):  Collaboration of nutritional care with other providers       Nutrition Diagnosis Status:   New    Nutrition Related Social Determinants of Health: SDOH: Unable to assess at this time.       Reason for Assessment    Reason For Assessment: consult  Diagnosis:  (Stem cell transplant candidate)    General Information Comments: 64 year old male with history of multiple myeloma, peripheral neuropathy, hypothyroidism and hyperlipidemia presents as a direct admission for Jennifer 200 Autologous stem cell transplant. RD consulted for stem cell transplant [facility]. Pt denies n/v. 11lb loss x 3 months. Wt stable  since march. Pt reported swallowing complaints due to phlegm but denies GERD symptoms.  RN reported via secure chat pt breakfast intake was 70%. Reports mucous Bms. LBM reported to be 1 week ago.    Onsite RD will provide Food safety, high calorie, high protein education 4/7.     Nutrition/Diet History    Spiritual, Cultural Beliefs, Sikh Practices, Values that Affect Care: no  Food Allergies: NKFA  Factors Affecting Nutritional Intake: None identified at this time    Anthropometrics    Height: 5'  "9" (175.3 cm)  Height (inches): 69 in  Height Method: Stated  Weight: 100.1 kg (220 lb 10.9 oz)  Weight (lb): 220.68 lb  Weight Method: Standard Scale  Ideal Body Weight (IBW), Male: 160 lb  % Ideal Body Weight, Male (lb): 138.41 %  BMI (Calculated): 32.6  BMI Grade: 30 - 34.9- obesity - grade I       Lab/Procedures/Meds    Pertinent Labs Reviewed: reviewed  Hemoglobin: 11.3, hematocrit: 34.5, glucose 120    Pertinent Medications Reviewed: reviewed  NaCl, Kcl, sulfamethizole, sodium bicarb sodium chloride powder, pantoprazole, ondansetron, olanzapine, diphenhydramine, levothyroxine    Estimated/Assessed Needs    Weight Used For Calorie Calculations: 100.1 kg (220 lb 10.9 oz)  Energy Calorie Requirements (kcal): 1776-2220kcal  Energy Need Method: Wilbarger-St Jeor (miff + miff x 1.25 (for increased energy needs))  Protein Requirements: 87-145g (1.2-2.0g/kg of IBW)  Weight Used For Protein Calculations: 72.7 kg (160 lb 4.4 oz)  Fluid Requirements (mL): per MD  Estimated Fluid Requirement Method: RDA Method  RDA Method (mL): 1776         Nutrition Prescription Ordered    Current Diet Order: Adult regular    Evaluation of Received Nutrient/Fluid Intake    I/O:   Intake/Output Summary (Last 24 hours) at 4/7/2025 0930  Last data filed at 4/7/2025 0552  Gross per 24 hour   Intake 427.85 ml   Output --   Net 427.85 ml       Energy Calories Required: meeting needs  Protein Required: meeting needs  Comments: LBM:4/2  Tolerance: tolerating  % Intake of Estimated Energy Needs: 50 - 75 %  % Meal Intake: 50 - 75 %    Nutrition Risk    Level of Risk/Frequency of Follow-up: moderate       Monitor and Evaluation    Monitor and Evaluation: Nutrition focused physical findings, Lipid profile, Inflammatory profile, Glucose/endocrine profile, Gastrointestinal profile, Electrolyte and renal panel, Weight, Energy intake, Food and beverage intake       Nutrition Follow-Up    RD Follow-up?: Yes    Jaz Perez, Registration Eligible, " Provisional LDN , MS

## 2025-04-07 NOTE — SUBJECTIVE & OBJECTIVE
Subjective:     Interval History: admitted yesterday for Jennifer 200 Autologous stem cell transplant for IgG multiple myeloma. BP high normal. Reports peripheral neuropathy improving with lyrica. Has no other complaints today.     Objective:     Vital Signs (Most Recent):  Temp: 97.4 °F (36.3 °C) (04/07/25 1128)  Pulse: 68 (04/07/25 1128)  Resp: 18 (04/07/25 1128)  BP: (!) 187/92 (04/07/25 1128)  SpO2: 99 % (04/07/25 1128) Vital Signs (24h Range):  Temp:  [97.4 °F (36.3 °C)-98.2 °F (36.8 °C)] 97.4 °F (36.3 °C)  Pulse:  [66-91] 68  Resp:  [18-20] 18  SpO2:  [93 %-99 %] 99 %  BP: (130-187)/(76-97) 187/92     Weight: 100.1 kg (220 lb 10.9 oz)  Body mass index is 32.59 kg/m².  Body surface area is 2.21 meters squared.      Intake/Output - Last 3 Shifts         04/05 0700  04/06 0659 04/06 0700  04/07 0659 04/07 0700  04/08 0659    P.O.   358    I.V. (mL/kg)  427.9 (4.3)     Total Intake(mL/kg)  427.9 (4.3) 358 (3.6)    Urine (mL/kg/hr)   800 (1.2)    Stool   0    Total Output   800    Net  +427.9 -442           Stool Occurrence   0 x             Physical Exam  Constitutional:       Appearance: Normal appearance. He is obese.   HENT:      Head: Normocephalic and atraumatic.   Eyes:      Extraocular Movements: Extraocular movements intact.      Pupils: Pupils are equal, round, and reactive to light.   Cardiovascular:      Rate and Rhythm: Normal rate and regular rhythm.   Pulmonary:      Effort: Pulmonary effort is normal.      Breath sounds: Normal breath sounds.   Abdominal:      General: Abdomen is flat.      Palpations: Abdomen is soft.   Musculoskeletal:         General: No swelling. Normal range of motion.      Cervical back: Normal range of motion and neck supple.   Skin:     General: Skin is warm and dry.      Comments: Vascath intact with no redness or drainage    Neurological:      General: No focal deficit present.      Mental Status: He is alert and oriented to person, place, and time.   Psychiatric:          Mood and Affect: Mood normal.         Behavior: Behavior normal.            Significant Labs:   CBC:   Recent Labs   Lab 04/07/25 0147   WBC 8.31   HGB 11.3*   HCT 34.5*   PLT 86*    and CMP:   Recent Labs   Lab 04/07/25 0147      K 3.8      CO2 24   BUN 15   CREATININE 0.7   CALCIUM 9.1   ALBUMIN 3.2*   BILITOT 0.2   ALKPHOS 114   AST 18   ALT 27   ANIONGAP 8       Diagnostic Results:  None

## 2025-04-07 NOTE — CARE UPDATE
Unit AXEL Care Support Interaction      I have reviewed the chart of Mitchel Joya who is hospitalized for Stem cell transplant candidate. The patient is currently located in the following unit: ONC/BMT        I have assisted the primary physician in management of the following:      Central Line - Present on admission to the unit - ordered blood cultures  MRSA Decolonization - Mupirocin ordered and CHG ordered       GRIFFIN STEVE  Unit Based AXEL

## 2025-04-07 NOTE — PLAN OF CARE
Recommendations    Continue adult regular diet as tolerated  Recommend boost plus TID if pts meal intake <75% x 48 hours  Provides additional ~1080kcal  Review bowel regimen    RD monitor wt, nutritional status, skin, and labs  Goals: meet % of EEN/EPN by next RD f/u

## 2025-04-08 PROBLEM — Z94.84 HISTORY OF AUTOLOGOUS STEM CELL TRANSPLANT: Status: ACTIVE | Noted: 2025-03-12

## 2025-04-08 LAB
ABSOLUTE EOSINOPHIL (OHS): 0 K/UL
ABSOLUTE MONOCYTE (OHS): 0.21 K/UL (ref 0.3–1)
ABSOLUTE NEUTROPHIL COUNT (OHS): 8.37 K/UL (ref 1.8–7.7)
ALBUMIN SERPL BCP-MCNC: 3.3 G/DL (ref 3.5–5.2)
ALP SERPL-CCNC: 105 UNIT/L (ref 40–150)
ALT SERPL W/O P-5'-P-CCNC: 39 UNIT/L (ref 10–44)
ANION GAP (OHS): 7 MMOL/L (ref 8–16)
AST SERPL-CCNC: 24 UNIT/L (ref 11–45)
BASOPHILS # BLD AUTO: 0.02 K/UL
BASOPHILS NFR BLD AUTO: 0.2 %
BILIRUB SERPL-MCNC: 0.4 MG/DL (ref 0.1–1)
BUN SERPL-MCNC: 15 MG/DL (ref 8–23)
CALCIUM SERPL-MCNC: 9.1 MG/DL (ref 8.7–10.5)
CHLORIDE SERPL-SCNC: 107 MMOL/L (ref 95–110)
CO2 SERPL-SCNC: 23 MMOL/L (ref 23–29)
CREAT SERPL-MCNC: 0.7 MG/DL (ref 0.5–1.4)
ERYTHROCYTE [DISTWIDTH] IN BLOOD BY AUTOMATED COUNT: 18.7 % (ref 11.5–14.5)
GFR SERPLBLD CREATININE-BSD FMLA CKD-EPI: >60 ML/MIN/1.73/M2
GLUCOSE SERPL-MCNC: 177 MG/DL (ref 70–110)
HCT VFR BLD AUTO: 37.2 % (ref 40–54)
HGB BLD-MCNC: 12.1 GM/DL (ref 14–18)
IMM GRANULOCYTES # BLD AUTO: 0.06 K/UL (ref 0–0.04)
IMM GRANULOCYTES NFR BLD AUTO: 0.6 % (ref 0–0.5)
LYMPHOCYTES # BLD AUTO: 0.6 K/UL (ref 1–4.8)
MAGNESIUM SERPL-MCNC: 1.9 MG/DL (ref 1.6–2.6)
MCH RBC QN AUTO: 29.9 PG (ref 27–31)
MCHC RBC AUTO-ENTMCNC: 32.5 G/DL (ref 32–36)
MCV RBC AUTO: 92 FL (ref 82–98)
NUCLEATED RBC (/100WBC) (OHS): 0 /100 WBC
PHOSPHATE SERPL-MCNC: 3.5 MG/DL (ref 2.7–4.5)
PLATELET # BLD AUTO: 125 K/UL (ref 150–450)
PLATELET BLD QL SMEAR: ABNORMAL
PMV BLD AUTO: 11.7 FL (ref 9.2–12.9)
POTASSIUM SERPL-SCNC: 3.9 MMOL/L (ref 3.5–5.1)
PROT SERPL-MCNC: 6.1 GM/DL (ref 6–8.4)
RBC # BLD AUTO: 4.05 M/UL (ref 4.6–6.2)
RELATIVE EOSINOPHIL (OHS): 0 %
RELATIVE LYMPHOCYTE (OHS): 6.5 % (ref 18–48)
RELATIVE MONOCYTE (OHS): 2.3 % (ref 4–15)
RELATIVE NEUTROPHIL (OHS): 90.4 % (ref 38–73)
SODIUM SERPL-SCNC: 137 MMOL/L (ref 136–145)
WBC # BLD AUTO: 9.26 K/UL (ref 3.9–12.7)

## 2025-04-08 PROCEDURE — 38241 TRANSPLT AUTOL HCT/DONOR: CPT

## 2025-04-08 PROCEDURE — 85025 COMPLETE CBC W/AUTO DIFF WBC: CPT | Performed by: INTERNAL MEDICINE

## 2025-04-08 PROCEDURE — 99233 SBSQ HOSP IP/OBS HIGH 50: CPT | Mod: ,,, | Performed by: INTERNAL MEDICINE

## 2025-04-08 PROCEDURE — 80053 COMPREHEN METABOLIC PANEL: CPT | Performed by: NURSE PRACTITIONER

## 2025-04-08 PROCEDURE — 63600175 PHARM REV CODE 636 W HCPCS: Performed by: NURSE PRACTITIONER

## 2025-04-08 PROCEDURE — 83735 ASSAY OF MAGNESIUM: CPT | Performed by: NURSE PRACTITIONER

## 2025-04-08 PROCEDURE — 84100 ASSAY OF PHOSPHORUS: CPT | Performed by: NURSE PRACTITIONER

## 2025-04-08 PROCEDURE — 25000003 PHARM REV CODE 250: Performed by: NURSE PRACTITIONER

## 2025-04-08 PROCEDURE — 25000003 PHARM REV CODE 250: Performed by: INTERNAL MEDICINE

## 2025-04-08 PROCEDURE — 20600001 HC STEP DOWN PRIVATE ROOM

## 2025-04-08 RX ADMIN — MUPIROCIN: 20 OINTMENT TOPICAL at 08:04

## 2025-04-08 RX ADMIN — DIPHENHYDRAMINE HYDROCHLORIDE 50 MG: 50 INJECTION, SOLUTION INTRAMUSCULAR; INTRAVENOUS at 10:04

## 2025-04-08 RX ADMIN — OLANZAPINE 5 MG: 5 TABLET, FILM COATED ORAL at 08:04

## 2025-04-08 RX ADMIN — ENOXAPARIN SODIUM 40 MG: 40 INJECTION SUBCUTANEOUS at 05:04

## 2025-04-08 RX ADMIN — Medication 400 MG: at 12:04

## 2025-04-08 RX ADMIN — LEVOTHYROXINE SODIUM 88 MCG: 88 TABLET ORAL at 04:04

## 2025-04-08 RX ADMIN — ONDANSETRON 8 MG: 8 TABLET, ORALLY DISINTEGRATING ORAL at 12:04

## 2025-04-08 RX ADMIN — Medication 1 DOSE: at 12:04

## 2025-04-08 RX ADMIN — DULOXETINE HYDROCHLORIDE 20 MG: 20 CAPSULE, DELAYED RELEASE ORAL at 08:04

## 2025-04-08 RX ADMIN — POTASSIUM CHLORIDE: 2 INJECTION, SOLUTION, CONCENTRATE INTRAVENOUS at 05:04

## 2025-04-08 RX ADMIN — FLUCONAZOLE 400 MG: 200 TABLET ORAL at 08:04

## 2025-04-08 RX ADMIN — ONDANSETRON 8 MG: 8 TABLET, ORALLY DISINTEGRATING ORAL at 04:04

## 2025-04-08 RX ADMIN — PANTOPRAZOLE SODIUM 40 MG: 40 TABLET, DELAYED RELEASE ORAL at 08:04

## 2025-04-08 RX ADMIN — LORAZEPAM 1 MG: 2 INJECTION INTRAMUSCULAR; INTRAVENOUS at 10:04

## 2025-04-08 RX ADMIN — Medication 1 DOSE: at 05:04

## 2025-04-08 RX ADMIN — LEVOFLOXACIN 500 MG: 500 TABLET, FILM COATED ORAL at 08:04

## 2025-04-08 RX ADMIN — ACYCLOVIR 800 MG: 200 CAPSULE ORAL at 08:04

## 2025-04-08 RX ADMIN — POTASSIUM CHLORIDE: 2 INJECTION, SOLUTION, CONCENTRATE INTRAVENOUS at 02:04

## 2025-04-08 RX ADMIN — PREGABALIN 75 MG: 75 CAPSULE ORAL at 08:04

## 2025-04-08 RX ADMIN — ONDANSETRON 8 MG: 8 TABLET, ORALLY DISINTEGRATING ORAL at 08:04

## 2025-04-08 RX ADMIN — SODIUM CHLORIDE AND POTASSIUM CHLORIDE: .9; .15 SOLUTION INTRAVENOUS at 09:04

## 2025-04-08 RX ADMIN — Medication 400 MG: at 08:04

## 2025-04-08 RX ADMIN — HYDROCORTISONE SODIUM SUCCINATE 250 MG: 250 INJECTION, POWDER, FOR SOLUTION INTRAMUSCULAR; INTRAVENOUS at 10:04

## 2025-04-08 RX ADMIN — Medication 1 DOSE: at 08:04

## 2025-04-08 NOTE — PROGRESS NOTES
Admit Assessment    Patient Identification  Mitchel Joya   :  1960  Admit Date:  2025  Attending Provider:  Neymar Beltrán MD              Referral:   Pt was admitted to  with a diagnosis of Stem cell transplant candidate, and was admitted this hospital stay due to Multiple myeloma [C90.0]  Stem cell transplant candidate [Z76.82]  Multiple myeloma [C90.00].   is involved was referred to the Social Work Department via routine referral.  Patient presents as a 64 y.o. year old sdivorced male.    Persons interviewed: patient.      Living Situation:  Recently living with his ex-wife Cherie ([phone]) at her home at [address] [place] [id number] while in treatment     Resides at [address] [place] [address] [address] [place] [address]   phone: [phone] (home).      (RETIRED) Functional Status Prior  Ambulation Prior: 0-->independent  Transferrin-->independent  Toiletin-->independent    Current or Past Agencies and Description of Services/Supplies    DME  Agency Name: none current  Equipment Currently Used at Home: none (owns RW and SPC)    Home Health  Agency Name: past use of [facility]    IV Infusion  Agency Name: none current    Nutrition: Oral.      Outpatient Pharmacy:     [facility]/pharmacy #[id number] - [place] - [address] AT [facility]  [address] [place] [address]  Phone: [phone] Fax: [phone]      Patient Preference of agencies include: none expressed.      Patient/Caregiver informed of right to choose providers or agencies.  Patient provides permission to release any necessary information to [facility] and to Non-[facility] agencies as needed to facilitate patient care, treatment planning, and patient discharge planning.  Written and verbal resources provided.      Coping   Coping well with support of family.  Feeling well today.  Family has worked out a caregiver schedule and sitter service has been contacted should there be an emergency.          Adjustment to Diagnosis and Treatment  Adequate.      Emotional/Behavioral/Cognitive Issues   Hx of anxiety, ADHD, and insomnia.  Compliant with Cymblta 20mg for neuropathy.           History/Current Symptoms of Anxiety/Depression: Yes  History/Current Substance Use:   Social History     Tobacco Use    Smoking status: Never    Smokeless tobacco: Never   Substance and Sexual Activity    Alcohol use: Yes     Comment: occ    Drug use: Never    Sexual activity: Not on file      Past CBD gummies for insomnia.    Indications of Abuse/Neglect: No:   Abuse Screen (yes response referral indicated)  Feels Unsafe at Home or Work/School: no  Feels Threatened by Someone: no  Does anyone try to keep you from having contact with others or doing things outside your home?: no  Physical Signs of Abuse Present: no    Financial:  Payer/Plan Subscr  Sex Relation Sub. Ins. ID Effective Group Num   1. MEDICARE - ME* JASPAL JAY 1960 Male Self [id number]                                    [address]   2. BLUE CROSS BL* JASPAL JAY 1960 Male Self [id number]                                   [address] [address]        Other identified concerns/needs: lodging.      Plan: to [facility] in care of family.      Interventions/Referrals: Previously referred to [facility] starting  by transplant coordinator.  Will update as needed.    Patient/caregiver engaged in treatment planning process.     providing psychosocial and supportive counseling, resources, education, assistance and discharge planning as appropriate.  Patient/caregiver state understanding of  available resources,  following, remains available.  Given SWer contact info and encouraged to reach out with any needs or concerns.

## 2025-04-08 NOTE — PLAN OF CARE
Day 0 of of Jennifer 200 Auto SCT. No acute events this shift. Patient AAOx4. Afebrile and VSS. Ambulates independently and voids without difficulty. No complaints of pain or nausea. IV fluids continued as ordered. Family remains at bedside. Bed in lowest position and locked. Side rails up x2. All possessions and call light within reach. Non-skid socks worn. Instructed to call fro assistance and voiced understanding. All needs of patient currently met. Will continue to monitor with frequent rounding.

## 2025-04-08 NOTE — ASSESSMENT & PLAN NOTE
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  SPEP with 0.16 g/dL and 0.12 g/dL. FLCs normal.   PET/CT (3/7/25) - no new hypermetabolic lesions.    See Autologous Stem cell transplant

## 2025-04-08 NOTE — SUBJECTIVE & OBJECTIVE
"  Subjective:     Interval History: Day 0 Jennifer 200 Auto for IgG Multiple Myeloma. Received 4.39x10^6 CD34 stem cells (5 bags) at 11:30 am and tolerated without difficulty. BP improved today. No complaints today except feeling "jittery" prior to transplant.     Objective:     Vital Signs (Most Recent):  Temp: 97.6 °F (36.4 °C) (04/08/25 1420)  Pulse: 100 (04/08/25 1420)  Resp: 18 (04/08/25 1420)  BP: (!) 142/78 (04/08/25 1420)  SpO2: 96 % (04/08/25 1420) Vital Signs (24h Range):  Temp:  [97.6 °F (36.4 °C)-98.3 °F (36.8 °C)] 97.6 °F (36.4 °C)  Pulse:  [] 100  Resp:  [18-20] 18  SpO2:  [94 %-98 %] 96 %  BP: (127-169)/(70-92) 142/78     Weight: 99.9 kg (220 lb 3.8 oz)  Body mass index is 32.52 kg/m².  Body surface area is 2.21 meters squared.      Intake/Output - Last 3 Shifts         04/06 0700  04/07 0659 04/07 0700  04/08 0659 04/08 0700  04/09 0659    P.O.  1578 1260    I.V. (mL/kg) 427.9 (4.3) 1575.5 (15.8)     Blood   600    Total Intake(mL/kg) 427.9 (4.3) 3153.5 (31.6) 1860 (18.6)    Urine (mL/kg/hr)  1500 (0.6) 800 (1)    Stool  0     Total Output  1500 800    Net +427.9 +1653.5 +1060           Urine Occurrence  6 x     Stool Occurrence  0 x              Physical Exam  Vitals reviewed.   Constitutional:       General: He is not in acute distress.     Appearance: Normal appearance. He is well-developed. He is obese.   HENT:      Head: Normocephalic and atraumatic.      Mouth/Throat:      Pharynx: No oropharyngeal exudate.   Eyes:      General: No scleral icterus.     Extraocular Movements: Extraocular movements intact.      Conjunctiva/sclera: Conjunctivae normal.      Pupils: Pupils are equal, round, and reactive to light.   Neck:      Thyroid: No thyromegaly.   Cardiovascular:      Rate and Rhythm: Normal rate and regular rhythm.      Heart sounds: Normal heart sounds.   Pulmonary:      Effort: Pulmonary effort is normal. No respiratory distress.      Breath sounds: Normal breath sounds.   Abdominal:      " General: Abdomen is flat. Bowel sounds are normal. There is no distension.      Palpations: Abdomen is soft.      Tenderness: There is no abdominal tenderness.   Musculoskeletal:         General: No swelling or tenderness. Normal range of motion.      Cervical back: Normal range of motion and neck supple. Normal range of motion.   Skin:     General: Skin is warm and dry.      Coloration: Skin is not pale.      Findings: No petechiae or rash.      Comments: Vascath intact with no redness or drainage    Neurological:      General: No focal deficit present.      Mental Status: He is alert and oriented to person, place, and time.      Cranial Nerves: No cranial nerve deficit.      Coordination: Coordination normal.   Psychiatric:         Mood and Affect: Mood normal.         Behavior: Behavior normal.         Thought Content: Thought content normal.            Significant Labs:   CBC:   Recent Labs   Lab 04/07/25 0147 04/08/25  0433   WBC 8.31 9.26   HGB 11.3* 12.1*   HCT 34.5* 37.2*   PLT 86* 125*    and CMP:   Recent Labs   Lab 04/07/25 0147 04/08/25  0426    137   K 3.8 3.9    107   CO2 24 23   BUN 15 15   CREATININE 0.7 0.7   CALCIUM 9.1 9.1   ALBUMIN 3.2* 3.3*   BILITOT 0.2 0.4   ALKPHOS 114 105   AST 18 24   ALT 27 39   ANIONGAP 8 7*       Diagnostic Results:  None

## 2025-04-08 NOTE — PROGRESS NOTES
"[facility]claughlin - Oncology ([facility])  Hematology  Bone Marrow Transplant  Progress Note    Patient Name: Mitchel Joya  Admission Date: 4/6/2025  Hospital Length of Stay: 2 days  Code Status: Full Code    Subjective:     Interval History: Day 0 Jennifer 200 Auto for IgG Multiple Myeloma. Received 4.39x10^6 CD34 stem cells (5 bags) at 11:30 am and tolerated without difficulty. BP improved today. No complaints today except feeling "jittery" prior to transplant.     Objective:     Vital Signs (Most Recent):  Temp: 97.6 °F (36.4 °C) (04/08/25 1420)  Pulse: 100 (04/08/25 1420)  Resp: 18 (04/08/25 1420)  BP: (!) 142/78 (04/08/25 1420)  SpO2: 96 % (04/08/25 1420) Vital Signs (24h Range):  Temp:  [97.6 °F (36.4 °C)-98.3 °F (36.8 °C)] 97.6 °F (36.4 °C)  Pulse:  [] 100  Resp:  [18-20] 18  SpO2:  [94 %-98 %] 96 %  BP: (127-169)/(70-92) 142/78     Weight: 99.9 kg (220 lb 3.8 oz)  Body mass index is 32.52 kg/m².  Body surface area is 2.21 meters squared.      Intake/Output - Last 3 Shifts         04/06 0700  04/07 0659 04/07 0700  04/08 0659 04/08 0700  04/09 0659    P.O.  1578 1260    I.V. (mL/kg) 427.9 (4.3) 1575.5 (15.8)     Blood   600    Total Intake(mL/kg) 427.9 (4.3) 3153.5 (31.6) 1860 (18.6)    Urine (mL/kg/hr)  1500 (0.6) 800 (1)    Stool  0     Total Output  1500 800    Net +427.9 +1653.5 +1060           Urine Occurrence  6 x     Stool Occurrence  0 x              Physical Exam  Vitals reviewed.   Constitutional:       General: He is not in acute distress.     Appearance: Normal appearance. He is well-developed. He is obese.   HENT:      Head: Normocephalic and atraumatic.      Mouth/Throat:      Pharynx: No oropharyngeal exudate.   Eyes:      General: No scleral icterus.     Extraocular Movements: Extraocular movements intact.      Conjunctiva/sclera: Conjunctivae normal.      Pupils: Pupils are equal, round, and reactive to light.   Cardiovascular:      Rate and Rhythm: Normal rate and regular rhythm.      Heart sounds: " Normal heart sounds.   Pulmonary:      Effort: Pulmonary effort is normal. No respiratory distress.      Breath sounds: Normal breath sounds.   Abdominal:      General: Abdomen is flat. Bowel sounds are normal. There is no distension.      Palpations: Abdomen is soft.      Tenderness: There is no abdominal tenderness.   Musculoskeletal:         General: No swelling or tenderness. Normal range of motion.      Cervical back: Normal range of motion and neck supple. Normal range of motion.   Skin:     General: Skin is warm and dry.      Coloration: Skin is not pale.      Findings: No petechiae or rash.      Comments: Vascath intact with no redness or drainage    Neurological:      General: No focal deficit present.      Mental Status: He is alert and oriented to person, place, and time.      Cranial Nerves: No cranial nerve deficit.      Coordination: Coordination normal.   Psychiatric:         Mood and Affect: Mood normal.         Behavior: Behavior normal.         Thought Content: Thought content normal.            Significant Labs:   CBC:   Recent Labs   Lab 04/07/25  0147 04/08/25  0433   WBC 8.31 9.26   HGB 11.3* 12.1*   HCT 34.5* 37.2*   PLT 86* 125*    and CMP:   Recent Labs   Lab 04/07/25  0147 04/08/25  0426    137   K 3.8 3.9    107   CO2 24 23   BUN 15 15   CREATININE 0.7 0.7   CALCIUM 9.1 9.1   ALBUMIN 3.2* 3.3*   BILITOT 0.2 0.4   ALKPHOS 114 105   AST 18 24   ALT 27 39   ANIONGAP 8 7*       Diagnostic Results:  None  Assessment/Plan:     * History of autologous stem cell transplant  Patient of Dr. Hemphill  - Coordinator: Xochilt Diaz  - Conditioning Regimen: Melphalan 200 mg/m2  - Cell Dose: 5-10 x 10^6 CD34/kg  - Maintenance: Korina+Rev  - Caregiver: Partner   - Housing: [place] [facility]   Admitting today for Jennifer 200 Auto, day 0  Stem cell infusion completed today Tuesday, 4/08/25 at 11:30. Received 5 bags with a total CD34 dose of 4.39 x10^6/kg, tolerated without issue    Planned conditioning  regimen:  Melphalan 200 on Day -1     Antimicrobial Prophylaxis:  Acyclovir starting on Day -1  Levofloxacin starting on Day -1  Fluconazole starting on Day -1  Bactrim starting on Day +30     Growth Factor Support:  Neupogen starting on Day +7        Multiple myeloma not having achieved remission  Treatment history:  10/29/24: C1 Korina Vrd (notes state velcade has been held due to neuropathy and lenalidomide has been held due to cytopenias), remained on this until pre-transplant restaging  11/15/24: Palliative radiation to right hip    Restaging:  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  SPEP with 0.16 g/dL and 0.12 g/dL. FLCs normal.   PET/CT (3/7/25) - no new hypermetabolic lesions.    See Autologous Stem cell transplant     Immunosuppressed due to chemotherapy  - will start ppx levaquin and diflucan   - on ppx Valtrex at home, no reported history of shingles. Transitioned to ppx acyclovir.     Hypercoagulable state  - on daily ASA at home, will hold while inpatient  - ppx lovenox until platelets <50k    Peripheral neuropathy  - continue home Lyrica    Elevated BP without diagnosis of hypertension  - BP consistently high normal, not on antihypertensives at home   - will monitor closely, low threshold to start antihypertensives  - PRN hydralazine   BP wnl this am    Obesity (BMI 30.0-34.9)  Body mass index is 32.52 kg/m². Morbid obesity complicates all aspects of disease management from diagnostic modalities to treatment. Weight loss encouraged and health benefits explained to patient.      Hypothyroid  - continue home synthroid    Hyperlipidemia  - hold home crestor while inpatient     Immunocompromised state associated with stem cell transplant  See Multiple myeloma not having achieved remission   See Autologous stem cell transplant     Neoplastic (malignant) related fatigue  - PT/OT consulted     At risk for malnutrition  -  RD/Nutrition consulted        VTE Risk Mitigation (From admission, onward)           Ordered     enoxaparin injection 40 mg  Every 24 hours         04/07/25 1333     heparin, porcine (PF) 100 unit/mL injection flush 300 Units  As needed (PRN)         04/06/25 2212     heparin (porcine) injection 1,600 Units  As needed (PRN)         04/06/25 2203     IP VTE HIGH RISK PATIENT  Once         04/06/25 1824     Place sequential compression device  Until discontinued         04/06/25 1824                    Disposition: inpatient     Dulce Maria Saul NP  Bone Marrow Transplant  [facility] - Oncology ([facility])

## 2025-04-08 NOTE — PLAN OF CARE
Day 0 Jennifer Auto SCT for IgG Multiple Myeloma. Pt alert and oriented x4. 5 bag transplant at 1130am administered by two BMT nurses, Gabriela and Chica. Dual sign off completed. Pre-medicated with IV Benadryl, Ativan, and hydrocortisone sod succ (PF) injection 250 mg. Pre and Post hydration administered as ordered. Emergency meds at bedside. Vitals monitored continuously throughout infusion and two hours post infusion. Infusion sheet sent to Blood Bank per request. Pt involved in plan of care and communicating needs throughout shift. Scheduled antiemetics given for nausea with moderate relief. Up in room and to bathroom independently; no c/o pain. Tolerating diet, voiding without difficulty. Electrolytes replaced PRN as per protocol. All VSS; no acute events so far this shift.  Pt remaining free from falls or injury throughout shift; bed locked and in lowest position; call light within reach.  Pt instructed to call for assistance as needed.  Q1H rounding done on pt.

## 2025-04-08 NOTE — ASSESSMENT & PLAN NOTE
Patient of Dr. Hemphill  - Coordinator: Xochilt Diaz  - Conditioning Regimen: Melphalan 200 mg/m2  - Cell Dose: 5-10 x 10^6 CD34/kg  - Maintenance: Korina+Rev  - Caregiver: Partner   - Housing: [place]   Admitting today for Jennifer 200 Auto, day 0  Stem cell infusion completed today Tuesday, 4/08/25 at 11:30. Received 5 bags with a total CD34 dose of 4.39 x10^6/kg, tolerated without issue    Planned conditioning regimen:  Melphalan 200 on Day -1     Antimicrobial Prophylaxis:  Acyclovir starting on Day -1  Levofloxacin starting on Day -1  Fluconazole starting on Day -1  Bactrim starting on Day +30     Growth Factor Support:  Neupogen starting on Day +7

## 2025-04-08 NOTE — ASSESSMENT & PLAN NOTE
Body mass index is 32.52 kg/m². Morbid obesity complicates all aspects of disease management from diagnostic modalities to treatment. Weight loss encouraged and health benefits explained to patient.

## 2025-04-08 NOTE — ASSESSMENT & PLAN NOTE
- BP consistently high normal, not on antihypertensives at home   - will monitor closely, low threshold to start antihypertensives  - PRN hydralazine   BP wnl this am

## 2025-04-09 PROBLEM — D69.6 THROMBOCYTOPENIA: Status: ACTIVE | Noted: 2025-04-09

## 2025-04-09 PROBLEM — D64.81 ANEMIA DUE TO CHEMOTHERAPY: Status: ACTIVE | Noted: 2025-04-09

## 2025-04-09 PROBLEM — T45.1X5A ANEMIA DUE TO CHEMOTHERAPY: Status: ACTIVE | Noted: 2025-04-09

## 2025-04-09 LAB
ABSOLUTE EOSINOPHIL (OHS): 0.01 K/UL
ABSOLUTE MONOCYTE (OHS): 0.4 K/UL (ref 0.3–1)
ABSOLUTE NEUTROPHIL COUNT (OHS): 9.82 K/UL (ref 1.8–7.7)
ALBUMIN SERPL BCP-MCNC: 2.8 G/DL (ref 3.5–5.2)
ALP SERPL-CCNC: 89 UNIT/L (ref 40–150)
ALT SERPL W/O P-5'-P-CCNC: 32 UNIT/L (ref 10–44)
ANION GAP (OHS): 3 MMOL/L (ref 8–16)
AST SERPL-CCNC: 25 UNIT/L (ref 11–45)
BASOPHILS # BLD AUTO: 0.01 K/UL
BASOPHILS NFR BLD AUTO: 0.1 %
BILIRUB SERPL-MCNC: 0.2 MG/DL (ref 0.1–1)
BUN SERPL-MCNC: 17 MG/DL (ref 8–23)
CALCIUM SERPL-MCNC: 8.5 MG/DL (ref 8.7–10.5)
CHLORIDE SERPL-SCNC: 112 MMOL/L (ref 95–110)
CO2 SERPL-SCNC: 24 MMOL/L (ref 23–29)
CREAT SERPL-MCNC: 0.7 MG/DL (ref 0.5–1.4)
ERYTHROCYTE [DISTWIDTH] IN BLOOD BY AUTOMATED COUNT: 19.3 % (ref 11.5–14.5)
GFR SERPLBLD CREATININE-BSD FMLA CKD-EPI: >60 ML/MIN/1.73/M2
GLUCOSE SERPL-MCNC: 149 MG/DL (ref 70–110)
HCT VFR BLD AUTO: 30.9 % (ref 40–54)
HGB BLD-MCNC: 10 GM/DL (ref 14–18)
IMM GRANULOCYTES # BLD AUTO: 0.1 K/UL (ref 0–0.04)
IMM GRANULOCYTES NFR BLD AUTO: 0.9 % (ref 0–0.5)
LYMPHOCYTES # BLD AUTO: 0.21 K/UL (ref 1–4.8)
MAGNESIUM SERPL-MCNC: 2.1 MG/DL (ref 1.6–2.6)
MCH RBC QN AUTO: 29.5 PG (ref 27–31)
MCHC RBC AUTO-ENTMCNC: 32.4 G/DL (ref 32–36)
MCV RBC AUTO: 91 FL (ref 82–98)
NUCLEATED RBC (/100WBC) (OHS): 0 /100 WBC
PHOSPHATE SERPL-MCNC: 2.9 MG/DL (ref 2.7–4.5)
PLATELET # BLD AUTO: 122 K/UL (ref 150–450)
PMV BLD AUTO: 11.2 FL (ref 9.2–12.9)
POTASSIUM SERPL-SCNC: 3.7 MMOL/L (ref 3.5–5.1)
PROT SERPL-MCNC: 5.1 GM/DL (ref 6–8.4)
RBC # BLD AUTO: 3.39 M/UL (ref 4.6–6.2)
RELATIVE EOSINOPHIL (OHS): 0.1 %
RELATIVE LYMPHOCYTE (OHS): 2 % (ref 18–48)
RELATIVE MONOCYTE (OHS): 3.8 % (ref 4–15)
RELATIVE NEUTROPHIL (OHS): 93.1 % (ref 38–73)
SODIUM SERPL-SCNC: 139 MMOL/L (ref 136–145)
WBC # BLD AUTO: 10.55 K/UL (ref 3.9–12.7)

## 2025-04-09 PROCEDURE — 20600001 HC STEP DOWN PRIVATE ROOM

## 2025-04-09 PROCEDURE — 82310 ASSAY OF CALCIUM: CPT | Performed by: NURSE PRACTITIONER

## 2025-04-09 PROCEDURE — 63600175 PHARM REV CODE 636 W HCPCS: Performed by: NURSE PRACTITIONER

## 2025-04-09 PROCEDURE — 63600175 PHARM REV CODE 636 W HCPCS: Performed by: INTERNAL MEDICINE

## 2025-04-09 PROCEDURE — 99233 SBSQ HOSP IP/OBS HIGH 50: CPT | Mod: ,,, | Performed by: INTERNAL MEDICINE

## 2025-04-09 PROCEDURE — 25000003 PHARM REV CODE 250: Performed by: INTERNAL MEDICINE

## 2025-04-09 PROCEDURE — 84100 ASSAY OF PHOSPHORUS: CPT | Performed by: NURSE PRACTITIONER

## 2025-04-09 PROCEDURE — 83735 ASSAY OF MAGNESIUM: CPT | Performed by: NURSE PRACTITIONER

## 2025-04-09 PROCEDURE — 85025 COMPLETE CBC W/AUTO DIFF WBC: CPT | Performed by: INTERNAL MEDICINE

## 2025-04-09 PROCEDURE — 25000003 PHARM REV CODE 250: Performed by: NURSE PRACTITIONER

## 2025-04-09 RX ORDER — AMLODIPINE BESYLATE 5 MG/1
5 TABLET ORAL DAILY
Status: DISCONTINUED | OUTPATIENT
Start: 2025-04-10 | End: 2025-04-15

## 2025-04-09 RX ADMIN — ACYCLOVIR 800 MG: 200 CAPSULE ORAL at 09:04

## 2025-04-09 RX ADMIN — ONDANSETRON 8 MG: 8 TABLET, ORALLY DISINTEGRATING ORAL at 01:04

## 2025-04-09 RX ADMIN — ONDANSETRON 8 MG: 8 TABLET, ORALLY DISINTEGRATING ORAL at 04:04

## 2025-04-09 RX ADMIN — OLANZAPINE 5 MG: 5 TABLET, FILM COATED ORAL at 09:04

## 2025-04-09 RX ADMIN — LEVOTHYROXINE SODIUM 88 MCG: 88 TABLET ORAL at 05:04

## 2025-04-09 RX ADMIN — DULOXETINE HYDROCHLORIDE 20 MG: 20 CAPSULE, DELAYED RELEASE ORAL at 09:04

## 2025-04-09 RX ADMIN — HEPARIN SODIUM 1600 UNITS: 1000 INJECTION INTRAVENOUS; SUBCUTANEOUS at 09:04

## 2025-04-09 RX ADMIN — ENOXAPARIN SODIUM 40 MG: 40 INJECTION SUBCUTANEOUS at 05:04

## 2025-04-09 RX ADMIN — MUPIROCIN: 20 OINTMENT TOPICAL at 09:04

## 2025-04-09 RX ADMIN — PREGABALIN 75 MG: 75 CAPSULE ORAL at 09:04

## 2025-04-09 RX ADMIN — LEVOFLOXACIN 500 MG: 500 TABLET, FILM COATED ORAL at 09:04

## 2025-04-09 RX ADMIN — PANTOPRAZOLE SODIUM 40 MG: 40 TABLET, DELAYED RELEASE ORAL at 09:04

## 2025-04-09 RX ADMIN — FLUCONAZOLE 400 MG: 200 TABLET ORAL at 09:04

## 2025-04-09 RX ADMIN — ONDANSETRON 8 MG: 8 TABLET, ORALLY DISINTEGRATING ORAL at 09:04

## 2025-04-09 RX ADMIN — Medication 1 DOSE: at 05:04

## 2025-04-09 RX ADMIN — Medication 1 DOSE: at 09:04

## 2025-04-09 RX ADMIN — POTASSIUM CHLORIDE 20 MEQ: 1500 TABLET, EXTENDED RELEASE ORAL at 09:04

## 2025-04-09 RX ADMIN — Medication 1 DOSE: at 01:04

## 2025-04-09 RX ADMIN — POTASSIUM CHLORIDE: 2 INJECTION, SOLUTION, CONCENTRATE INTRAVENOUS at 03:04

## 2025-04-09 NOTE — PLAN OF CARE
Pt involved in plan of care and communicating needs throughout shift. Up in room and to bathroom independently; voiding without difficulty. Tolerating diet. No c/o pain. Pt received potassium PO this morning. Afebrile / All VSS. Pt remaining free from falls or injury throughout shift. Bed locked and in lowest position, personal belongings and call light within reach, non skid socks on when OOB. Pt instructed to call for assistance as needed. Hourly rounding done on pt.

## 2025-04-09 NOTE — PLAN OF CARE
Day +1 of of Jennifer 200 Auto SCT. No acute events this shift. Patient AAOx4. Afebrile and VSS. Ambulates independently and voids without difficulty. No complaints of pain or nausea. IV fluids continued as ordered. Family remains at bedside. Bed in lowest position and locked. Side rails up x2. All possessions and call light within reach. Non-skid socks worn. Instructed to call fro assistance and voiced understanding. All needs of patient currently met. Will continue to monitor with frequent rounding.

## 2025-04-09 NOTE — PROGRESS NOTES
[facility] - Oncology ([facility])  Hematology  Bone Marrow Transplant  Progress Note    Patient Name: Mitchel Joya  Admission Date: 4/6/2025  Hospital Length of Stay: 3 days  Code Status: Full Code    Subjective:     Interval History: Day +1 from a Jennifer 200 auto SCT for MM. Received stem cells yesterday. Tolerated transplant well. Remains afebrile. VSS. Weight up 6.5 lbs from admission. Received large volume of IV fluids with transplant. Good oral intake, so stopping IVF. Does not appear volume overloaded, so will defer diuresis.    Objective:     Vital Signs (Most Recent):  Temp: 97.8 °F (36.6 °C) (04/09/25 0800)  Pulse: 94 (04/09/25 0800)  Resp: 18 (04/09/25 0800)  BP: (!) 151/58 (04/09/25 0800)  SpO2: 97 % (04/09/25 0800) Vital Signs (24h Range):  Temp:  [97.6 °F (36.4 °C)-98.3 °F (36.8 °C)] 97.8 °F (36.6 °C)  Pulse:  [] 94  Resp:  [16-20] 18  SpO2:  [92 %-98 %] 97 %  BP: (122-169)/(58-84) 151/58     Weight: 103 kg (227 lb 2.9 oz)  Body mass index is 33.55 kg/m².  Body surface area is 2.24 meters squared.    ECOG SCORE           [unfilled]    Intake/Output - Last 3 Shifts         04/07 0700 04/08 0659 04/08 0700 04/09 0659 04/09 0700  04/10 0659    P.O. 1578 2480     I.V. (mL/kg) 1575.5 (15.8) 2171.2 (21.1)     Blood  600     Total Intake(mL/kg) 3153.5 (31.6) 5251.2 (51)     Urine (mL/kg/hr) 1500 (0.6) 800 (0.3)     Stool 0      Total Output 1500 800     Net +1653.5 +4451.2            Urine Occurrence 6 x 7 x     Stool Occurrence 0 x 1 x              Physical Exam  Constitutional:       Appearance: He is well-developed.   HENT:      Head: Normocephalic and atraumatic.      Mouth/Throat:      Pharynx: No oropharyngeal exudate.   Eyes:      General:         Right eye: No discharge.         Left eye: No discharge.      Conjunctiva/sclera: Conjunctivae normal.      Pupils: Pupils are equal, round, and reactive to light.   Cardiovascular:      Rate and Rhythm: Normal rate and regular rhythm.      Heart sounds:  Normal heart sounds. No murmur heard.  Pulmonary:      Effort: Pulmonary effort is normal. No respiratory distress.      Breath sounds: Normal breath sounds. No wheezing or rales.   Abdominal:      General: Bowel sounds are normal. There is no distension.      Palpations: Abdomen is soft.      Tenderness: There is no abdominal tenderness.   Musculoskeletal:         General: No deformity. Normal range of motion.      Cervical back: Normal range of motion and neck supple.   Skin:     General: Skin is warm and dry.      Findings: No erythema or rash.      Comments: Right chest wall vas cath. Dressing c/d/i. No sign of infection to site.   Neurological:      Mental Status: He is alert and oriented to person, place, and time.   Psychiatric:         Behavior: Behavior normal.         Thought Content: Thought content normal.         Judgment: Judgment normal.            Significant Labs:   CBC:   Recent Labs   Lab 04/08/25  0433 04/09/25  0403   WBC 9.26 10.55   HGB 12.1* 10.0*   HCT 37.2* 30.9*   * 122*    and CMP:   Recent Labs   Lab 04/08/25  0426 04/09/25  0403    139   K 3.9 3.7    112*   CO2 23 24   BUN 15 17   CREATININE 0.7 0.7   CALCIUM 9.1 8.5*   ALBUMIN 3.3* 2.8*   BILITOT 0.4 0.2   ALKPHOS 105 89   AST 24 25   ALT 39 32   ANIONGAP 7* 3*       Diagnostic Results:  None  Assessment/Plan:     * History of autologous stem cell transplant  Patient of Dr. Hemphill  - Coordinator: Xochilt Diaz  - Conditioning Regimen: Melphalan 200 mg/m2  - Cell Dose: 5-10 x 10^6 CD34/kg  - Maintenance: Korina+Rev  - Caregiver: Partner   - [facility] [facility]   Admitted 4/6/25 for Jennifer 200 Auto. Today is Day +1.  Stem cell infusion completed  4/08/25 at 11:30. Received 5 bags with a total CD34 dose of 4.39 x10^6/kg, tolerated without issue    Planned conditioning regimen:  Melphalan 200 on Day -1     Antimicrobial Prophylaxis:  Acyclovir starting on Day -1  Levofloxacin starting on Day -1  Fluconazole starting on Day  -1  Bactrim starting on Day +30     Growth Factor Support:  Neupogen starting on Day +7        Multiple myeloma not having achieved remission  Treatment history:  10/29/24: C1 Korina Vrd (notes state velcade has been held due to neuropathy and lenalidomide has been held due to cytopenias), remained on this until pre-transplant restaging  11/15/24: Palliative radiation to right hip    Restaging:  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  SPEP with 0.16 g/dL and 0.12 g/dL. FLCs normal.   PET/CT (3/7/25) - no new hypermetabolic lesions.    See Autologous Stem cell transplant     Anemia due to chemotherapy  - Anticipate pancytopenia following chemotherapy.  - Transfuse for hgb < 7.  - Daily CBC while IP.    Thrombocytopenia  - Anticipate pancytopenia following chemotherapy.  - Transfuse for plts < 10K.  - Daily CBC while IP.    Elevated BP without diagnosis of hypertension  - BP consistently high normal, not on antihypertensives at home   - will monitor closely, low threshold to start antihypertensives  - PRN hydralazine available  - Stopping IVF today    Immunocompromised state associated with stem cell transplant  - See Multiple myeloma not having achieved remission   - See Autologous stem cell transplant     Neoplastic (malignant) related fatigue  - PT/OT consulted per BMT protocol    At risk for malnutrition  - RD/Nutrition consulted per BMT protocol    Immunosuppressed due to chemotherapy  - will start ppx levaquin and diflucan   - on ppx Valtrex at home, no reported history of shingles. Transitioned to ppx acyclovir.     Hypercoagulable state  - on daily ASA at home, will hold while inpatient  - ppx lovenox until platelets <50k    Hypothyroid  - continue home synthroid    Hyperlipidemia  - hold home crestor while inpatient     Peripheral neuropathy  - continue home Lyrica    Obesity (BMI 30.0-34.9)  Body mass index is 33.55 kg/m².  Morbid obesity complicates all aspects of disease management from diagnostic modalities to treatment. Weight loss encouraged and health benefits explained to patient.          VTE Risk Mitigation (From admission, onward)           Ordered     enoxaparin injection 40 mg  Every 24 hours         04/07/25 1333     heparin, porcine (PF) 100 unit/mL injection flush 300 Units  As needed (PRN)         04/06/25 2212     heparin (porcine) injection 1,600 Units  As needed (PRN)         04/06/25 2203     IP VTE HIGH RISK PATIENT  Once         04/06/25 1824     Place sequential compression device  Until discontinued         04/06/25 1824                    Disposition: Inpatient for autologous SCT.    Gina Harding, NP  Bone Marrow Transplant  [facility] - Oncology ([facility])

## 2025-04-09 NOTE — ASSESSMENT & PLAN NOTE
Patient of Dr. Hemphill  - Coordinator: Xochilt Diaz  - Conditioning Regimen: Melphalan 200 mg/m2  - Cell Dose: 5-10 x 10^6 CD34/kg  - Maintenance: Korina+Rev  - Caregiver: Partner   - [facility]: [facility]   Admitted 4/6/25 for Jennifer 200 Auto. Today is Day +1.  Stem cell infusion completed  4/08/25 at 11:30. Received 5 bags with a total CD34 dose of 4.39 x10^6/kg, tolerated without issue    Planned conditioning regimen:  Melphalan 200 on Day -1     Antimicrobial Prophylaxis:  Acyclovir starting on Day -1  Levofloxacin starting on Day -1  Fluconazole starting on Day -1  Bactrim starting on Day +30     Growth Factor Support:  Neupogen starting on Day +7

## 2025-04-09 NOTE — ASSESSMENT & PLAN NOTE
- Anticipate pancytopenia following chemotherapy.  - Transfuse for plts < 10K.  - Daily CBC while IP.

## 2025-04-09 NOTE — SUBJECTIVE & OBJECTIVE
Subjective:     Interval History: Day +1 from a Jennifer 200 auto SCT for MM. Received stem cells yesterday. Tolerated transplant well. Remains afebrile. VSS. Weight up 6.5 lbs from admission. Received large volume of IV fluids with transplant. Good oral intake, so stopping IVF. Does not appear volume overloaded, so will defer diuresis.    Objective:     Vital Signs (Most Recent):  Temp: 97.8 °F (36.6 °C) (04/09/25 0800)  Pulse: 94 (04/09/25 0800)  Resp: 18 (04/09/25 0800)  BP: (!) 151/58 (04/09/25 0800)  SpO2: 97 % (04/09/25 0800) Vital Signs (24h Range):  Temp:  [97.6 °F (36.4 °C)-98.3 °F (36.8 °C)] 97.8 °F (36.6 °C)  Pulse:  [] 94  Resp:  [16-20] 18  SpO2:  [92 %-98 %] 97 %  BP: (122-169)/(58-84) 151/58     Weight: 103 kg (227 lb 2.9 oz)  Body mass index is 33.55 kg/m².  Body surface area is 2.24 meters squared.    ECOG SCORE           [unfilled]    Intake/Output - Last 3 Shifts         04/07 0700  04/08 0659 04/08 0700 04/09 0659 04/09 0700  04/10 0659    P.O. 1578 2480     I.V. (mL/kg) 1575.5 (15.8) 2171.2 (21.1)     Blood  600     Total Intake(mL/kg) 3153.5 (31.6) 5251.2 (51)     Urine (mL/kg/hr) 1500 (0.6) 800 (0.3)     Stool 0      Total Output 1500 800     Net +1653.5 +4451.2            Urine Occurrence 6 x 7 x     Stool Occurrence 0 x 1 x              Physical Exam  Constitutional:       Appearance: He is well-developed.   HENT:      Head: Normocephalic and atraumatic.      Mouth/Throat:      Pharynx: No oropharyngeal exudate.   Eyes:      General:         Right eye: No discharge.         Left eye: No discharge.      Conjunctiva/sclera: Conjunctivae normal.      Pupils: Pupils are equal, round, and reactive to light.   Cardiovascular:      Rate and Rhythm: Normal rate and regular rhythm.      Heart sounds: Normal heart sounds. No murmur heard.  Pulmonary:      Effort: Pulmonary effort is normal. No respiratory distress.      Breath sounds: Normal breath sounds. No wheezing or rales.   Abdominal:       General: Bowel sounds are normal. There is no distension.      Palpations: Abdomen is soft.      Tenderness: There is no abdominal tenderness.   Musculoskeletal:         General: No deformity. Normal range of motion.      Cervical back: Normal range of motion and neck supple.   Skin:     General: Skin is warm and dry.      Findings: No erythema or rash.      Comments: Right chest wall vas cath. Dressing c/d/i. No sign of infection to site.   Neurological:      Mental Status: He is alert and oriented to person, place, and time.   Psychiatric:         Behavior: Behavior normal.         Thought Content: Thought content normal.         Judgment: Judgment normal.            Significant Labs:   CBC:   Recent Labs   Lab 04/08/25  0433 04/09/25  0403   WBC 9.26 10.55   HGB 12.1* 10.0*   HCT 37.2* 30.9*   * 122*    and CMP:   Recent Labs   Lab 04/08/25  0426 04/09/25  0403    139   K 3.9 3.7    112*   CO2 23 24   BUN 15 17   CREATININE 0.7 0.7   CALCIUM 9.1 8.5*   ALBUMIN 3.3* 2.8*   BILITOT 0.4 0.2   ALKPHOS 105 89   AST 24 25   ALT 39 32   ANIONGAP 7* 3*       Diagnostic Results:  None

## 2025-04-09 NOTE — ASSESSMENT & PLAN NOTE
- Anticipate pancytopenia following chemotherapy.  - Transfuse for hgb < 7.  - Daily CBC while IP.

## 2025-04-09 NOTE — ASSESSMENT & PLAN NOTE
Body mass index is 33.55 kg/m². Morbid obesity complicates all aspects of disease management from diagnostic modalities to treatment. Weight loss encouraged and health benefits explained to patient.

## 2025-04-09 NOTE — ASSESSMENT & PLAN NOTE
- BP consistently high normal, not on antihypertensives at home   - will monitor closely, low threshold to start antihypertensives  - PRN hydralazine available  - Stopping IVF today

## 2025-04-10 LAB
ABSOLUTE EOSINOPHIL (OHS): 0.03 K/UL
ABSOLUTE MONOCYTE (OHS): 0.06 K/UL (ref 0.3–1)
ABSOLUTE NEUTROPHIL COUNT (OHS): 3.44 K/UL (ref 1.8–7.7)
ALBUMIN SERPL BCP-MCNC: 2.9 G/DL (ref 3.5–5.2)
ALP SERPL-CCNC: 80 UNIT/L (ref 40–150)
ALT SERPL W/O P-5'-P-CCNC: 29 UNIT/L (ref 10–44)
ANION GAP (OHS): 5 MMOL/L (ref 8–16)
AST SERPL-CCNC: 18 UNIT/L (ref 11–45)
BASOPHILS # BLD AUTO: 0.01 K/UL
BASOPHILS NFR BLD AUTO: 0.3 %
BILIRUB SERPL-MCNC: 0.4 MG/DL (ref 0.1–1)
BLOOD GROUP ANTIBODIES SERPL: NORMAL
BUN SERPL-MCNC: 14 MG/DL (ref 8–23)
CALCIUM SERPL-MCNC: 8.9 MG/DL (ref 8.7–10.5)
CHLORIDE SERPL-SCNC: 111 MMOL/L (ref 95–110)
CO2 SERPL-SCNC: 27 MMOL/L (ref 23–29)
CREAT SERPL-MCNC: 0.7 MG/DL (ref 0.5–1.4)
DAT IGG-SP REAG RBC-IMP: NORMAL
ERYTHROCYTE [DISTWIDTH] IN BLOOD BY AUTOMATED COUNT: 19.6 % (ref 11.5–14.5)
GFR SERPLBLD CREATININE-BSD FMLA CKD-EPI: >60 ML/MIN/1.73/M2
GLUCOSE SERPL-MCNC: 100 MG/DL (ref 70–110)
HCT VFR BLD AUTO: 32.5 % (ref 40–54)
HGB BLD-MCNC: 10.5 GM/DL (ref 14–18)
IMM GRANULOCYTES # BLD AUTO: 0.02 K/UL (ref 0–0.04)
IMM GRANULOCYTES NFR BLD AUTO: 0.5 % (ref 0–0.5)
INDIRECT COOMBS: ABNORMAL
LYMPHOCYTES # BLD AUTO: 0.18 K/UL (ref 1–4.8)
MAGNESIUM SERPL-MCNC: 2.1 MG/DL (ref 1.6–2.6)
MCH RBC QN AUTO: 29.7 PG (ref 27–31)
MCHC RBC AUTO-ENTMCNC: 32.3 G/DL (ref 32–36)
MCV RBC AUTO: 92 FL (ref 82–98)
NUCLEATED RBC (/100WBC) (OHS): 0 /100 WBC
PHOSPHATE SERPL-MCNC: 3.6 MG/DL (ref 2.7–4.5)
PLATELET # BLD AUTO: 132 K/UL (ref 150–450)
PMV BLD AUTO: 11.4 FL (ref 9.2–12.9)
POTASSIUM SERPL-SCNC: 3.9 MMOL/L (ref 3.5–5.1)
PROT SERPL-MCNC: 5.2 GM/DL (ref 6–8.4)
RBC # BLD AUTO: 3.53 M/UL (ref 4.6–6.2)
RELATIVE EOSINOPHIL (OHS): 0.8 %
RELATIVE LYMPHOCYTE (OHS): 4.8 % (ref 18–48)
RELATIVE MONOCYTE (OHS): 1.6 % (ref 4–15)
RELATIVE NEUTROPHIL (OHS): 92 % (ref 38–73)
RH BLD: ABNORMAL
SODIUM SERPL-SCNC: 143 MMOL/L (ref 136–145)
SPECIMEN OUTDATE: ABNORMAL
WBC # BLD AUTO: 3.74 K/UL (ref 3.9–12.7)

## 2025-04-10 PROCEDURE — 86901 BLOOD TYPING SEROLOGIC RH(D): CPT | Performed by: NURSE PRACTITIONER

## 2025-04-10 PROCEDURE — 25000003 PHARM REV CODE 250: Performed by: INTERNAL MEDICINE

## 2025-04-10 PROCEDURE — 25000003 PHARM REV CODE 250: Performed by: NURSE PRACTITIONER

## 2025-04-10 PROCEDURE — 63600175 PHARM REV CODE 636 W HCPCS: Performed by: NURSE PRACTITIONER

## 2025-04-10 PROCEDURE — 99233 SBSQ HOSP IP/OBS HIGH 50: CPT | Mod: ,,, | Performed by: INTERNAL MEDICINE

## 2025-04-10 PROCEDURE — 86870 RBC ANTIBODY IDENTIFICATION: CPT | Performed by: NURSE PRACTITIONER

## 2025-04-10 PROCEDURE — 86880 COOMBS TEST DIRECT: CPT | Performed by: NURSE PRACTITIONER

## 2025-04-10 PROCEDURE — 85025 COMPLETE CBC W/AUTO DIFF WBC: CPT | Performed by: INTERNAL MEDICINE

## 2025-04-10 PROCEDURE — 63600175 PHARM REV CODE 636 W HCPCS: Performed by: INTERNAL MEDICINE

## 2025-04-10 PROCEDURE — 84100 ASSAY OF PHOSPHORUS: CPT | Performed by: NURSE PRACTITIONER

## 2025-04-10 PROCEDURE — 80053 COMPREHEN METABOLIC PANEL: CPT | Performed by: NURSE PRACTITIONER

## 2025-04-10 PROCEDURE — 20600001 HC STEP DOWN PRIVATE ROOM

## 2025-04-10 PROCEDURE — 83735 ASSAY OF MAGNESIUM: CPT | Performed by: NURSE PRACTITIONER

## 2025-04-10 RX ORDER — PROCHLORPERAZINE EDISYLATE 5 MG/ML
10 INJECTION INTRAMUSCULAR; INTRAVENOUS EVERY 6 HOURS PRN
Status: DISCONTINUED | OUTPATIENT
Start: 2025-04-10 | End: 2025-04-22 | Stop reason: HOSPADM

## 2025-04-10 RX ORDER — ONDANSETRON HYDROCHLORIDE 2 MG/ML
8 INJECTION, SOLUTION INTRAVENOUS EVERY 8 HOURS PRN
Status: DISCONTINUED | OUTPATIENT
Start: 2025-04-10 | End: 2025-04-15

## 2025-04-10 RX ADMIN — DULOXETINE HYDROCHLORIDE 20 MG: 20 CAPSULE, DELAYED RELEASE ORAL at 09:04

## 2025-04-10 RX ADMIN — ONDANSETRON 8 MG: 2 INJECTION INTRAMUSCULAR; INTRAVENOUS at 04:04

## 2025-04-10 RX ADMIN — HEPARIN SODIUM 1600 UNITS: 1000 INJECTION INTRAVENOUS; SUBCUTANEOUS at 02:04

## 2025-04-10 RX ADMIN — HEPARIN SODIUM 1600 UNITS: 1000 INJECTION INTRAVENOUS; SUBCUTANEOUS at 04:04

## 2025-04-10 RX ADMIN — Medication 1 DOSE: at 02:04

## 2025-04-10 RX ADMIN — Medication 1 DOSE: at 08:04

## 2025-04-10 RX ADMIN — Medication 1 DOSE: at 04:04

## 2025-04-10 RX ADMIN — AMLODIPINE BESYLATE 5 MG: 5 TABLET ORAL at 09:04

## 2025-04-10 RX ADMIN — MUPIROCIN: 20 OINTMENT TOPICAL at 09:04

## 2025-04-10 RX ADMIN — PROCHLORPERAZINE EDISYLATE 10 MG: 5 INJECTION INTRAMUSCULAR; INTRAVENOUS at 02:04

## 2025-04-10 RX ADMIN — MUPIROCIN: 20 OINTMENT TOPICAL at 08:04

## 2025-04-10 RX ADMIN — Medication 1 DOSE: at 09:04

## 2025-04-10 RX ADMIN — ACYCLOVIR 800 MG: 200 CAPSULE ORAL at 09:04

## 2025-04-10 RX ADMIN — PREGABALIN 75 MG: 75 CAPSULE ORAL at 08:04

## 2025-04-10 RX ADMIN — LEVOTHYROXINE SODIUM 88 MCG: 88 TABLET ORAL at 06:04

## 2025-04-10 RX ADMIN — Medication 1 DOSE: at 01:04

## 2025-04-10 RX ADMIN — LEVOFLOXACIN 500 MG: 500 TABLET, FILM COATED ORAL at 09:04

## 2025-04-10 RX ADMIN — ENOXAPARIN SODIUM 40 MG: 40 INJECTION SUBCUTANEOUS at 04:04

## 2025-04-10 RX ADMIN — ACYCLOVIR 800 MG: 200 CAPSULE ORAL at 08:04

## 2025-04-10 RX ADMIN — FLUCONAZOLE 400 MG: 200 TABLET ORAL at 09:04

## 2025-04-10 RX ADMIN — PANTOPRAZOLE SODIUM 40 MG: 40 TABLET, DELAYED RELEASE ORAL at 09:04

## 2025-04-10 NOTE — ASSESSMENT & PLAN NOTE
Body mass index is 33.79 kg/m². Morbid obesity complicates all aspects of disease management from diagnostic modalities to treatment. Weight loss encouraged and health benefits explained to patient.

## 2025-04-10 NOTE — PLAN OF CARE
Pt involved in plan of care and communicating needs throughout shift. Up in room and to bathroom independently; voiding without difficulty. Tolerating diet. Complaint of nausea. Compazine given. Pt tolerated well. Afebrile, All VSS. Pt remaining free from falls or injury throughout shift. Bed locked and in lowest position, personal belongings and call light within reach, non skid socks on when OOB. Pt instructed to call for assistance as needed. Hourly rounding done on pt.

## 2025-04-10 NOTE — PROGRESS NOTES
[facility] - Oncology ([facility])  Hematology  Bone Marrow Transplant  Progress Note    Patient Name: Mitchel Joya  Admission Date: 4/6/2025  Hospital Length of Stay: 4 days  Code Status: Full Code    Subjective:     Interval History: Day +2 from a Jennifer 200 auto SCT for MM. Remains afebrile. VSS. Denies GI toxicities at this time. No mucositis. Oral intake remains good. Will resume IVF with reduced oral fluid intake or with fluid losses through diarrhea or emesis. Remains active.     Objective:     Vital Signs (Most Recent):  Temp: 98.2 °F (36.8 °C) (04/10/25 0750)  Pulse: 80 (04/10/25 0750)  Resp: 16 (04/10/25 0750)  BP: (!) 140/79 (04/10/25 0750)  SpO2: 98 % (04/10/25 0750) Vital Signs (24h Range):  Temp:  [97.4 °F (36.3 °C)-98.2 °F (36.8 °C)] 98.2 °F (36.8 °C)  Pulse:  [80-91] 80  Resp:  [16-20] 16  SpO2:  [96 %-98 %] 98 %  BP: (127-159)/(76-84) 140/79     Weight: 103.8 kg (228 lb 13.4 oz)  Body mass index is 33.79 kg/m².  Body surface area is 2.25 meters squared.    ECOG SCORE           [unfilled]    Intake/Output - Last 3 Shifts         04/08 0700 04/09 0659 04/09 0700  04/10 0659 04/10 0700 04/11 0659    P.O. 2480 1508     I.V. (mL/kg) 2171.2 (21.1) 247.5 (2.4)     Blood 600      Total Intake(mL/kg) 5251.2 (51) 1755.5 (16.9)     Urine (mL/kg/hr) 800 (0.3) 8 (0)     Stool  1     Total Output 800 9     Net +4451.2 +1746.5            Urine Occurrence 7 x      Stool Occurrence 1 x               Physical Exam  Constitutional:       Appearance: He is well-developed.   HENT:      Head: Normocephalic and atraumatic.      Mouth/Throat:      Pharynx: No oropharyngeal exudate.   Eyes:      General:         Right eye: No discharge.         Left eye: No discharge.      Conjunctiva/sclera: Conjunctivae normal.      Pupils: Pupils are equal, round, and reactive to light.   Cardiovascular:      Rate and Rhythm: Normal rate and regular rhythm.      Heart sounds: Normal heart sounds. No murmur heard.  Pulmonary:      Effort:  Pulmonary effort is normal. No respiratory distress.      Breath sounds: Normal breath sounds. No wheezing or rales.   Abdominal:      General: Bowel sounds are normal. There is no distension.      Palpations: Abdomen is soft.      Tenderness: There is no abdominal tenderness.   Musculoskeletal:         General: No deformity. Normal range of motion.      Cervical back: Normal range of motion and neck supple.   Skin:     General: Skin is warm and dry.      Findings: No erythema or rash.      Comments: Right chest wall vas cath. Dressing c/d/i. No sign of infection to site.   Neurological:      Mental Status: He is alert and oriented to person, place, and time.   Psychiatric:         Behavior: Behavior normal.         Thought Content: Thought content normal.         Judgment: Judgment normal.            Significant Labs:   CBC:   Recent Labs   Lab 04/09/25  0403 04/10/25  0433   WBC 10.55 3.74*   HGB 10.0* 10.5*   HCT 30.9* 32.5*   * 132*    and CMP:   Recent Labs   Lab 04/09/25  0403 04/10/25  0433    143   K 3.7 3.9   * 111*   CO2 24 27   BUN 17 14   CREATININE 0.7 0.7   CALCIUM 8.5* 8.9   ALBUMIN 2.8* 2.9*   BILITOT 0.2 0.4   ALKPHOS 89 80   AST 25 18   ALT 32 29   ANIONGAP 3* 5*       Diagnostic Results:  None  Assessment/Plan:     * History of autologous stem cell transplant  Patient of Dr. Hemphill  - Coordinator: Xochilt Diaz  - Conditioning Regimen: Melphalan 200 mg/m2  - Cell Dose: 5-10 x 10^6 CD34/kg  - Maintenance: Korina+Rev  - Caregiver: Partner   - [facility] [facility]   Admitted 4/6/25 for Jennifer 200 Auto. Today is Day +2.  Stem cell infusion completed  4/08/25 at 11:30. Received 5 bags with a total CD34 dose of 4.39 x10^6/kg, tolerated without issue    Planned conditioning regimen:  Melphalan 200 on Day -1     Antimicrobial Prophylaxis:  Acyclovir starting on Day -1  Levofloxacin starting on Day -1  Fluconazole starting on Day -1  Bactrim starting on Day +30     Growth Factor  Support:  Neupogen starting on Day +7        Multiple myeloma not having achieved remission  Treatment history:  10/29/24: C1 Korina Vrd (notes state velcade has been held due to neuropathy and lenalidomide has been held due to cytopenias), remained on this until pre-transplant restaging  11/15/24: Palliative radiation to right hip    Restaging:  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  SPEP with 0.16 g/dL and 0.12 g/dL. FLCs normal.   PET/CT (3/7/25) - no new hypermetabolic lesions.    See Autologous Stem cell transplant     Anemia due to chemotherapy  - Anticipate pancytopenia following chemotherapy.  - Transfuse for hgb < 7.  - Daily CBC while IP.    Thrombocytopenia  - Anticipate pancytopenia following chemotherapy.  - Transfuse for plts < 10K.  - Daily CBC while IP.    Elevated BP without diagnosis of hypertension  - BP consistently high normal, not on antihypertensives at home   - will monitor closely, low threshold to start antihypertensives  - PRN hydralazine available  - Stopped IVF 4/9. Will resume with reduced oral intake or with fluid losses through diarrhea or emesis.    Immunocompromised state associated with stem cell transplant  - See Multiple myeloma not having achieved remission   - See Autologous stem cell transplant     Neoplastic (malignant) related fatigue  - PT/OT consulted per BMT protocol    At risk for malnutrition  - RD/Nutrition consulted per BMT protocol    Immunosuppressed due to chemotherapy  - will start ppx levaquin and diflucan   - on ppx Valtrex at home, no reported history of shingles. Transitioned to ppx acyclovir.     Hypercoagulable state  - on daily ASA at home, will hold while inpatient  - ppx lovenox until platelets <50k    Hypothyroid  - continue home synthroid    Hyperlipidemia  - Holding home crestor while inpatient     Peripheral neuropathy  - continue home Lyrica    Obesity (BMI  30.0-34.9)  Body mass index is 33.79 kg/m². Morbid obesity complicates all aspects of disease management from diagnostic modalities to treatment. Weight loss encouraged and health benefits explained to patient.          VTE Risk Mitigation (From admission, onward)           Ordered     enoxaparin injection 40 mg  Every 24 hours         04/07/25 1333     heparin, porcine (PF) 100 unit/mL injection flush 300 Units  As needed (PRN)         04/06/25 2212     heparin (porcine) injection 1,600 Units  As needed (PRN)         04/06/25 2203     IP VTE HIGH RISK PATIENT  Once         04/06/25 1824     Place sequential compression device  Until discontinued         04/06/25 1824                    Disposition: Inpatient for autologous SCT.    Gina Harding, NP  Bone Marrow Transplant  [facility] - Oncology ([facility])

## 2025-04-10 NOTE — PLAN OF CARE
Day +2 from Jennifer Auto SCT for MM. Plan of care reviewed. Patient is oriented X4. Ambulates without difficulty. Right vast cath heparin locked. No complaints of pain or discomfort at this time. No N/V. Remained afebrile. All questions and concerns addressed. Wife at bedside. All safety precautions in place. Remains free of injury. All needs met at this time.

## 2025-04-10 NOTE — ASSESSMENT & PLAN NOTE
- BP consistently high normal, not on antihypertensives at home   - will monitor closely, low threshold to start antihypertensives  - PRN hydralazine available  - Stopped IVF 4/9. Will resume with reduced oral intake or with fluid losses through diarrhea or emesis.

## 2025-04-10 NOTE — ASSESSMENT & PLAN NOTE
Patient of Dr. Hemphill  - Coordinator: Xochilt Diaz  - Conditioning Regimen: Melphalan 200 mg/m2  - Cell Dose: 5-10 x 10^6 CD34/kg  - Maintenance: Korina+Rev  - Caregiver: Partner   - [facility] [facility]   Admitted 4/6/25 for Jennifer 200 Auto. Today is Day +2.  Stem cell infusion completed  4/08/25 at 11:30. Received 5 bags with a total CD34 dose of 4.39 x10^6/kg, tolerated without issue    Planned conditioning regimen:  Melphalan 200 on Day -1     Antimicrobial Prophylaxis:  Acyclovir starting on Day -1  Levofloxacin starting on Day -1  Fluconazole starting on Day -1  Bactrim starting on Day +30     Growth Factor Support:  Neupogen starting on Day +7

## 2025-04-10 NOTE — SUBJECTIVE & OBJECTIVE
Subjective:     Interval History: Day +2 from a Jennifer 200 auto SCT for MM. Remains afebrile. VSS. Denies GI toxicities at this time. No mucositis. Oral intake remains good. Will resume IVF with reduced oral fluid intake or with fluid losses through diarrhea or emesis. Remains active.     Objective:     Vital Signs (Most Recent):  Temp: 98.2 °F (36.8 °C) (04/10/25 0750)  Pulse: 80 (04/10/25 0750)  Resp: 16 (04/10/25 0750)  BP: (!) 140/79 (04/10/25 0750)  SpO2: 98 % (04/10/25 0750) Vital Signs (24h Range):  Temp:  [97.4 °F (36.3 °C)-98.2 °F (36.8 °C)] 98.2 °F (36.8 °C)  Pulse:  [80-91] 80  Resp:  [16-20] 16  SpO2:  [96 %-98 %] 98 %  BP: (127-159)/(76-84) 140/79     Weight: 103.8 kg (228 lb 13.4 oz)  Body mass index is 33.79 kg/m².  Body surface area is 2.25 meters squared.    ECOG SCORE           [unfilled]    Intake/Output - Last 3 Shifts         04/08 0700  04/09 0659 04/09 0700  04/10 0659 04/10 0700 04/11 0659    P.O. 2480 1508     I.V. (mL/kg) 2171.2 (21.1) 247.5 (2.4)     Blood 600      Total Intake(mL/kg) 5251.2 (51) 1755.5 (16.9)     Urine (mL/kg/hr) 800 (0.3) 8 (0)     Stool  1     Total Output 800 9     Net +4451.2 +1746.5            Urine Occurrence 7 x      Stool Occurrence 1 x               Physical Exam  Constitutional:       Appearance: He is well-developed.   HENT:      Head: Normocephalic and atraumatic.      Mouth/Throat:      Pharynx: No oropharyngeal exudate.   Eyes:      General:         Right eye: No discharge.         Left eye: No discharge.      Conjunctiva/sclera: Conjunctivae normal.      Pupils: Pupils are equal, round, and reactive to light.   Cardiovascular:      Rate and Rhythm: Normal rate and regular rhythm.      Heart sounds: Normal heart sounds. No murmur heard.  Pulmonary:      Effort: Pulmonary effort is normal. No respiratory distress.      Breath sounds: Normal breath sounds. No wheezing or rales.   Abdominal:      General: Bowel sounds are normal. There is no distension.       Palpations: Abdomen is soft.      Tenderness: There is no abdominal tenderness.   Musculoskeletal:         General: No deformity. Normal range of motion.      Cervical back: Normal range of motion and neck supple.   Skin:     General: Skin is warm and dry.      Findings: No erythema or rash.      Comments: Right chest wall vas cath. Dressing c/d/i. No sign of infection to site.   Neurological:      Mental Status: He is alert and oriented to person, place, and time.   Psychiatric:         Behavior: Behavior normal.         Thought Content: Thought content normal.         Judgment: Judgment normal.            Significant Labs:   CBC:   Recent Labs   Lab 04/09/25  0403 04/10/25  0433   WBC 10.55 3.74*   HGB 10.0* 10.5*   HCT 30.9* 32.5*   * 132*    and CMP:   Recent Labs   Lab 04/09/25  0403 04/10/25  0433    143   K 3.7 3.9   * 111*   CO2 24 27   BUN 17 14   CREATININE 0.7 0.7   CALCIUM 8.5* 8.9   ALBUMIN 2.8* 2.9*   BILITOT 0.2 0.4   ALKPHOS 89 80   AST 25 18   ALT 32 29   ANIONGAP 3* 5*       Diagnostic Results:  None

## 2025-04-11 LAB
ABSOLUTE EOSINOPHIL (OHS): 0.06 K/UL
ABSOLUTE MONOCYTE (OHS): 0.03 K/UL (ref 0.3–1)
ABSOLUTE NEUTROPHIL COUNT (OHS): 1.92 K/UL (ref 1.8–7.7)
ALBUMIN SERPL BCP-MCNC: 2.9 G/DL (ref 3.5–5.2)
ALP SERPL-CCNC: 80 UNIT/L (ref 40–150)
ALT SERPL W/O P-5'-P-CCNC: 27 UNIT/L (ref 10–44)
ANION GAP (OHS): 7 MMOL/L (ref 8–16)
AST SERPL-CCNC: 16 UNIT/L (ref 11–45)
BASOPHILS # BLD AUTO: 0.01 K/UL
BASOPHILS NFR BLD AUTO: 0.5 %
BILIRUB SERPL-MCNC: 0.4 MG/DL (ref 0.1–1)
BUN SERPL-MCNC: 11 MG/DL (ref 8–23)
CALCIUM SERPL-MCNC: 9.1 MG/DL (ref 8.7–10.5)
CHLORIDE SERPL-SCNC: 106 MMOL/L (ref 95–110)
CO2 SERPL-SCNC: 28 MMOL/L (ref 23–29)
CREAT SERPL-MCNC: 0.7 MG/DL (ref 0.5–1.4)
DOHLE BOD BLD QL SMEAR: PRESENT
ERYTHROCYTE [DISTWIDTH] IN BLOOD BY AUTOMATED COUNT: 19 % (ref 11.5–14.5)
GFR SERPLBLD CREATININE-BSD FMLA CKD-EPI: >60 ML/MIN/1.73/M2
GLUCOSE SERPL-MCNC: 120 MG/DL (ref 70–110)
HCT VFR BLD AUTO: 33.5 % (ref 40–54)
HGB BLD-MCNC: 10.7 GM/DL (ref 14–18)
IMM GRANULOCYTES # BLD AUTO: 0 K/UL (ref 0–0.04)
IMM GRANULOCYTES NFR BLD AUTO: 0 % (ref 0–0.5)
LARGE/GIANT PLATELETS (OHS): PRESENT
LYMPHOCYTES # BLD AUTO: 0.08 K/UL (ref 1–4.8)
MAGNESIUM SERPL-MCNC: 2.1 MG/DL (ref 1.6–2.6)
MCH RBC QN AUTO: 29.3 PG (ref 27–31)
MCHC RBC AUTO-ENTMCNC: 31.9 G/DL (ref 32–36)
MCV RBC AUTO: 92 FL (ref 82–98)
NUCLEATED RBC (/100WBC) (OHS): 0 /100 WBC
PHOSPHATE SERPL-MCNC: 3.5 MG/DL (ref 2.7–4.5)
PLATELET # BLD AUTO: 135 K/UL (ref 150–450)
PLATELET BLD QL SMEAR: ABNORMAL
PMV BLD AUTO: 9.9 FL (ref 9.2–12.9)
POTASSIUM SERPL-SCNC: 3.9 MMOL/L (ref 3.5–5.1)
PROT SERPL-MCNC: 5.4 GM/DL (ref 6–8.4)
RBC # BLD AUTO: 3.65 M/UL (ref 4.6–6.2)
RELATIVE EOSINOPHIL (OHS): 2.9 %
RELATIVE LYMPHOCYTE (OHS): 3.8 % (ref 18–48)
RELATIVE MONOCYTE (OHS): 1.4 % (ref 4–15)
RELATIVE NEUTROPHIL (OHS): 91.4 % (ref 38–73)
SODIUM SERPL-SCNC: 141 MMOL/L (ref 136–145)
SPHEROCYTES BLD QL SMEAR: ABNORMAL
TOXIC GRANULES BLD QL SMEAR: PRESENT
WBC # BLD AUTO: 2.1 K/UL (ref 3.9–12.7)

## 2025-04-11 PROCEDURE — 25000003 PHARM REV CODE 250: Performed by: INTERNAL MEDICINE

## 2025-04-11 PROCEDURE — 83735 ASSAY OF MAGNESIUM: CPT | Performed by: NURSE PRACTITIONER

## 2025-04-11 PROCEDURE — 80053 COMPREHEN METABOLIC PANEL: CPT | Performed by: NURSE PRACTITIONER

## 2025-04-11 PROCEDURE — 85025 COMPLETE CBC W/AUTO DIFF WBC: CPT | Performed by: INTERNAL MEDICINE

## 2025-04-11 PROCEDURE — 25000003 PHARM REV CODE 250: Performed by: NURSE PRACTITIONER

## 2025-04-11 PROCEDURE — 63600175 PHARM REV CODE 636 W HCPCS: Performed by: NURSE PRACTITIONER

## 2025-04-11 PROCEDURE — 99233 SBSQ HOSP IP/OBS HIGH 50: CPT | Mod: ,,, | Performed by: INTERNAL MEDICINE

## 2025-04-11 PROCEDURE — 20600001 HC STEP DOWN PRIVATE ROOM

## 2025-04-11 PROCEDURE — 84100 ASSAY OF PHOSPHORUS: CPT | Performed by: NURSE PRACTITIONER

## 2025-04-11 PROCEDURE — 27000207 HC ISOLATION

## 2025-04-11 RX ADMIN — Medication 1 DOSE: at 08:04

## 2025-04-11 RX ADMIN — FLUCONAZOLE 400 MG: 200 TABLET ORAL at 09:04

## 2025-04-11 RX ADMIN — MUPIROCIN: 20 OINTMENT TOPICAL at 09:04

## 2025-04-11 RX ADMIN — AMLODIPINE BESYLATE 5 MG: 5 TABLET ORAL at 09:04

## 2025-04-11 RX ADMIN — ENOXAPARIN SODIUM 40 MG: 40 INJECTION SUBCUTANEOUS at 05:04

## 2025-04-11 RX ADMIN — ACYCLOVIR 800 MG: 200 CAPSULE ORAL at 08:04

## 2025-04-11 RX ADMIN — PANTOPRAZOLE SODIUM 40 MG: 40 TABLET, DELAYED RELEASE ORAL at 09:04

## 2025-04-11 RX ADMIN — Medication 1 DOSE: at 01:04

## 2025-04-11 RX ADMIN — PREGABALIN 75 MG: 75 CAPSULE ORAL at 08:04

## 2025-04-11 RX ADMIN — Medication 1 DOSE: at 05:04

## 2025-04-11 RX ADMIN — LEVOFLOXACIN 500 MG: 500 TABLET, FILM COATED ORAL at 09:04

## 2025-04-11 RX ADMIN — DULOXETINE HYDROCHLORIDE 20 MG: 20 CAPSULE, DELAYED RELEASE ORAL at 09:04

## 2025-04-11 RX ADMIN — ACYCLOVIR 800 MG: 200 CAPSULE ORAL at 09:04

## 2025-04-11 RX ADMIN — LEVOTHYROXINE SODIUM 88 MCG: 88 TABLET ORAL at 05:04

## 2025-04-11 RX ADMIN — Medication 1 DOSE: at 09:04

## 2025-04-11 RX ADMIN — ONDANSETRON 8 MG: 2 INJECTION INTRAMUSCULAR; INTRAVENOUS at 05:04

## 2025-04-11 NOTE — SUBJECTIVE & OBJECTIVE
Subjective:     Interval History: Day +3 from a Jennifer 200 auto SCT for MM. Remains afebrile. VSS. Blood counts dropping predictably. Having nausea yesterday but no emesis. PRN Zofran started. Can schedule antiemetics if indicated. Still no diarrhea or mucositis.     Objective:     Vital Signs (Most Recent):  Temp: 98 °F (36.7 °C) (04/11/25 0809)  Pulse: 78 (04/11/25 0809)  Resp: 16 (04/11/25 0809)  BP: 117/77 (04/11/25 0809)  SpO2: (!) 93 % (04/11/25 0809) Vital Signs (24h Range):  Temp:  [97.8 °F (36.6 °C)-98.3 °F (36.8 °C)] 98 °F (36.7 °C)  Pulse:  [78-85] 78  Resp:  [16-20] 16  SpO2:  [93 %-98 %] 93 %  BP: (117-140)/(74-86) 117/77     Weight: 99.4 kg (219 lb 2.2 oz)  Body mass index is 32.36 kg/m².  Body surface area is 2.2 meters squared.    ECOG SCORE           [unfilled]    Intake/Output - Last 3 Shifts         04/09 0700  04/10 0659 04/10 0700 04/11 0659 04/11 0700  04/12 0659    P.O. 1508 1425     I.V. (mL/kg) 247.5 (2.4)      Blood       Total Intake(mL/kg) 1755.5 (16.9) 1425 (14.3)     Urine (mL/kg/hr) 8 (0) 5 (0)     Emesis/NG output  0     Other  0     Stool 1 1     Total Output 9 6     Net +1746.5 +1419            Urine Occurrence  5 x 1 x    Stool Occurrence  0 x 1 x    Emesis Occurrence  0 x              Physical Exam  Constitutional:       Appearance: He is well-developed.   HENT:      Head: Normocephalic and atraumatic.      Mouth/Throat:      Pharynx: No oropharyngeal exudate.   Eyes:      General:         Right eye: No discharge.         Left eye: No discharge.      Conjunctiva/sclera: Conjunctivae normal.      Pupils: Pupils are equal, round, and reactive to light.   Cardiovascular:      Rate and Rhythm: Normal rate and regular rhythm.      Heart sounds: Normal heart sounds. No murmur heard.  Pulmonary:      Effort: Pulmonary effort is normal. No respiratory distress.      Breath sounds: Normal breath sounds. No wheezing or rales.   Abdominal:      General: Bowel sounds are normal. There is no  distension.      Palpations: Abdomen is soft.      Tenderness: There is no abdominal tenderness.   Musculoskeletal:         General: No deformity. Normal range of motion.      Cervical back: Normal range of motion and neck supple.   Skin:     General: Skin is warm and dry.      Findings: No erythema or rash.      Comments: Right chest wall vas cath. Dressing c/d/i. No sign of infection to site.   Neurological:      Mental Status: He is alert and oriented to person, place, and time.   Psychiatric:         Behavior: Behavior normal.         Thought Content: Thought content normal.         Judgment: Judgment normal.            Significant Labs:   CBC:   Recent Labs   Lab 04/10/25  0433 04/11/25  0336   WBC 3.74* 2.10*   HGB 10.5* 10.7*   HCT 32.5* 33.5*   * 135*    and CMP:   Recent Labs   Lab 04/10/25  0433 04/11/25  0336    141   K 3.9 3.9   * 106   CO2 27 28   BUN 14 11   CREATININE 0.7 0.7   CALCIUM 8.9 9.1   ALBUMIN 2.9* 2.9*   BILITOT 0.4 0.4   ALKPHOS 80 80   AST 18 16   ALT 29 27   ANIONGAP 5* 7*       Diagnostic Results:  None

## 2025-04-11 NOTE — PROGRESS NOTES
[facility] ([facility])  Hematology  Bone Marrow Transplant  Progress Note    Patient Name: Mitchel Joya  Admission Date: 4/6/2025  Hospital Length of Stay: 5 days  Code Status: Full Code    Subjective:     Interval History: Day +3 from a Jennifer 200 auto SCT for MM. Remains afebrile. VSS. Blood counts dropping predictably. Having nausea yesterday but no emesis. PRN Zofran started. Can schedule antiemetics if indicated. Still no diarrhea or mucositis.     Objective:     Vital Signs (Most Recent):  Temp: 98 °F (36.7 °C) (04/11/25 0809)  Pulse: 78 (04/11/25 0809)  Resp: 16 (04/11/25 0809)  BP: 117/77 (04/11/25 0809)  SpO2: (!) 93 % (04/11/25 0809) Vital Signs (24h Range):  Temp:  [97.8 °F (36.6 °C)-98.3 °F (36.8 °C)] 98 °F (36.7 °C)  Pulse:  [78-85] 78  Resp:  [16-20] 16  SpO2:  [93 %-98 %] 93 %  BP: (117-140)/(74-86) 117/77     Weight: 99.4 kg (219 lb 2.2 oz)  Body mass index is 32.36 kg/m².  Body surface area is 2.2 meters squared.    ECOG SCORE           [unfilled]    Intake/Output - Last 3 Shifts         04/09 0700  04/10 0659 04/10 0700 04/11 0659 04/11 0700 04/12 0659    P.O. 1508 1425     I.V. (mL/kg) 247.5 (2.4)      Blood       Total Intake(mL/kg) 1755.5 (16.9) 1425 (14.3)     Urine (mL/kg/hr) 8 (0) 5 (0)     Emesis/NG output  0     Other  0     Stool 1 1     Total Output 9 6     Net +1746.5 +1419            Urine Occurrence  5 x 1 x    Stool Occurrence  0 x 1 x    Emesis Occurrence  0 x              Physical Exam  Constitutional:       Appearance: He is well-developed.   HENT:      Head: Normocephalic and atraumatic.      Mouth/Throat:      Pharynx: No oropharyngeal exudate.   Eyes:      General:         Right eye: No discharge.         Left eye: No discharge.      Conjunctiva/sclera: Conjunctivae normal.      Pupils: Pupils are equal, round, and reactive to light.   Cardiovascular:      Rate and Rhythm: Normal rate and regular rhythm.      Heart sounds: Normal heart sounds. No murmur heard.  Pulmonary:       Effort: Pulmonary effort is normal. No respiratory distress.      Breath sounds: Normal breath sounds. No wheezing or rales.   Abdominal:      General: Bowel sounds are normal. There is no distension.      Palpations: Abdomen is soft.      Tenderness: There is no abdominal tenderness.   Musculoskeletal:         General: No deformity. Normal range of motion.      Cervical back: Normal range of motion and neck supple.   Skin:     General: Skin is warm and dry.      Findings: No erythema or rash.      Comments: Right chest wall vas cath. Dressing c/d/i. No sign of infection to site.   Neurological:      Mental Status: He is alert and oriented to person, place, and time.   Psychiatric:         Behavior: Behavior normal.         Thought Content: Thought content normal.         Judgment: Judgment normal.            Significant Labs:   CBC:   Recent Labs   Lab 04/10/25  0433 04/11/25  0336   WBC 3.74* 2.10*   HGB 10.5* 10.7*   HCT 32.5* 33.5*   * 135*    and CMP:   Recent Labs   Lab 04/10/25  0433 04/11/25  0336    141   K 3.9 3.9   * 106   CO2 27 28   BUN 14 11   CREATININE 0.7 0.7   CALCIUM 8.9 9.1   ALBUMIN 2.9* 2.9*   BILITOT 0.4 0.4   ALKPHOS 80 80   AST 18 16   ALT 29 27   ANIONGAP 5* 7*       Diagnostic Results:  None  Assessment/Plan:     * History of autologous stem cell transplant  Patient of Dr. Hemphill  - Coordinator: Xochilt Diaz  - Conditioning Regimen: Melphalan 200 mg/m2  - Cell Dose: 5-10 x 10^6 CD34/kg  - Maintenance: Korina+Rev  - Caregiver: Partner   - Housing: [facility]   Admitted 4/6/25 for Jennifer 200 Auto. Today is Day +3.  Stem cell infusion completed  4/08/25 at 11:30. Received 5 bags with a total CD34 dose of 4.39 x10^6/kg, tolerated without issue    Planned conditioning regimen:  Melphalan 200 on Day -1     Antimicrobial Prophylaxis:  Acyclovir starting on Day -1  Levofloxacin starting on Day -1  Fluconazole starting on Day -1  Bactrim starting on Day +30     Growth Factor  Support:  Neupogen starting on Day +7        Multiple myeloma not having achieved remission  Treatment history:  10/29/24: C1 Korina Vrd (notes state velcade has been held due to neuropathy and lenalidomide has been held due to cytopenias), remained on this until pre-transplant restaging  11/15/24: Palliative radiation to right hip    Restaging:  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  SPEP with 0.16 g/dL and 0.12 g/dL. FLCs normal.   PET/CT (3/7/25) - no new hypermetabolic lesions.    See Autologous Stem cell transplant     Anemia due to chemotherapy  - Anticipate pancytopenia following chemotherapy.  - Transfuse for hgb < 7.  - Daily CBC while IP.    Thrombocytopenia  - Anticipate pancytopenia following chemotherapy.  - Transfuse for plts < 10K.  - Daily CBC while IP.    Elevated BP without diagnosis of hypertension  - BP consistently high normal, not on antihypertensives at home.   - Monitoring closely, low threshold to start antihypertensives.  - PRN hydralazine available.  - Stopped IVF 4/9. Will resume with reduced oral intake or with fluid losses through diarrhea or emesis.  - Improved off IVF.    Immunocompromised state associated with stem cell transplant  - See Multiple myeloma not having achieved remission   - See Autologous stem cell transplant     Neoplastic (malignant) related fatigue  - PT/OT consulted per BMT protocol    At risk for malnutrition  - RD/Nutrition consulted per BMT protocol    Immunosuppressed due to chemotherapy  - Will start ppx levaquin and diflucan   - On ppx Valtrex at home, no reported history of shingles. Transitioned to ppx acyclovir.     Hypercoagulable state  - On daily ASA at home, will hold while inpatient  - Ppx lovenox until platelets <50k    Hypothyroid  - Continue home synthroid    Hyperlipidemia  - Holding home crestor while inpatient     Peripheral neuropathy  - Continue home  Lyrica    Obesity (BMI 30.0-34.9)  Body mass index is 32.36 kg/m². Morbid obesity complicates all aspects of disease management from diagnostic modalities to treatment. Weight loss encouraged and health benefits explained to patient.          VTE Risk Mitigation (From admission, onward)           Ordered     enoxaparin injection 40 mg  Every 24 hours         04/07/25 1333     heparin, porcine (PF) 100 unit/mL injection flush 300 Units  As needed (PRN)         04/06/25 2212     heparin (porcine) injection 1,600 Units  As needed (PRN)         04/06/25 2203     IP VTE HIGH RISK PATIENT  Once         04/06/25 1824     Place sequential compression device  Until discontinued         04/06/25 1824                    Disposition: Inpatient for autologous SCT. Awaiting neutrophil engraftment.    Gina Harding, NP  Bone Marrow Transplant  [facility] - Oncology ([facility])

## 2025-04-11 NOTE — ASSESSMENT & PLAN NOTE
Patient of Dr. Hemphill  - Coordinator: Xochilt Diaz  - Conditioning Regimen: Melphalan 200 mg/m2  - Cell Dose: 5-10 x 10^6 CD34/kg  - Maintenance: Korina+Rev  - Caregiver: Partner   - [facility]: [facility]   Admitted 4/6/25 for Jennifer 200 Auto. Today is Day +3.  Stem cell infusion completed  4/08/25 at 11:30. Received 5 bags with a total CD34 dose of 4.39 x10^6/kg, tolerated without issue    Planned conditioning regimen:  Melphalan 200 on Day -1     Antimicrobial Prophylaxis:  Acyclovir starting on Day -1  Levofloxacin starting on Day -1  Fluconazole starting on Day -1  Bactrim starting on Day +30     Growth Factor Support:  Neupogen starting on Day +7

## 2025-04-11 NOTE — PLAN OF CARE
Day +3 Jennifer Auto SCT for MM. Patient AAOX4, VSS, afebrile, on room air. Patient had no concerns over night. Patient up to the bathroom independently, free from falls and injuries. I&O's monitored as ordered. Bed in lowest position, side rails up x2, non-skid socks on, call light in reach. Bed alarm refused, patient educated on importance and verbalized understanding. Patient educated to use call light for any needs, patient verbalizes understanding. There are no concerns at this time. Plan of care on going.

## 2025-04-11 NOTE — ASSESSMENT & PLAN NOTE
- BP consistently high normal, not on antihypertensives at home.   - Monitoring closely, low threshold to start antihypertensives.  - PRN hydralazine available.  - Stopped IVF 4/9. Will resume with reduced oral intake or with fluid losses through diarrhea or emesis.  - Improved off IVF.

## 2025-04-12 LAB
ABO + RH BLD: NORMAL
ABSOLUTE NEUTROPHIL MANUAL (OHS): 0.8 K/UL
ALBUMIN SERPL BCP-MCNC: 2.9 G/DL (ref 3.5–5.2)
ALP SERPL-CCNC: 77 UNIT/L (ref 40–150)
ALT SERPL W/O P-5'-P-CCNC: 24 UNIT/L (ref 10–44)
ANION GAP (OHS): 6 MMOL/L (ref 8–16)
ANISOCYTOSIS BLD QL SMEAR: SLIGHT
AST SERPL-CCNC: 20 UNIT/L (ref 11–45)
BACTERIA BLD CULT: NORMAL
BACTERIA BLD CULT: NORMAL
BILIRUB SERPL-MCNC: 0.3 MG/DL (ref 0.1–1)
BLD PROD TYP BPU: NORMAL
BLOOD UNIT EXPIRATION DATE: NORMAL
BLOOD UNIT TYPE CODE: 5300
BUN SERPL-MCNC: 12 MG/DL (ref 8–23)
CALCIUM SERPL-MCNC: 9 MG/DL (ref 8.7–10.5)
CHLORIDE SERPL-SCNC: 105 MMOL/L (ref 95–110)
CO2 SERPL-SCNC: 28 MMOL/L (ref 23–29)
CREAT SERPL-MCNC: 0.7 MG/DL (ref 0.5–1.4)
CROSSMATCH INTERPRETATION: NORMAL
DISPENSE STATUS: NORMAL
EOSINOPHIL NFR BLD MANUAL: 10 % (ref 0–8)
ERYTHROCYTE [DISTWIDTH] IN BLOOD BY AUTOMATED COUNT: 18.5 % (ref 11.5–14.5)
GFR SERPLBLD CREATININE-BSD FMLA CKD-EPI: >60 ML/MIN/1.73/M2
GLUCOSE SERPL-MCNC: 114 MG/DL (ref 70–110)
HCT VFR BLD AUTO: 32.3 % (ref 40–54)
HGB BLD-MCNC: 10.4 GM/DL (ref 14–18)
LYMPHOCYTES NFR BLD MANUAL: 7 % (ref 18–48)
MAGNESIUM SERPL-MCNC: 2.1 MG/DL (ref 1.6–2.6)
MCH RBC QN AUTO: 29.2 PG (ref 27–31)
MCHC RBC AUTO-ENTMCNC: 32.2 G/DL (ref 32–36)
MCV RBC AUTO: 91 FL (ref 82–98)
MONOCYTES NFR BLD MANUAL: 2 % (ref 4–15)
NEUTROPHILS NFR BLD MANUAL: 81 % (ref 38–73)
NUCLEATED RBC (/100WBC) (OHS): 0 /100 WBC
PHOSPHATE SERPL-MCNC: 3.4 MG/DL (ref 2.7–4.5)
PLATELET # BLD AUTO: 124 K/UL (ref 150–450)
PLATELET BLD QL SMEAR: ABNORMAL
PMV BLD AUTO: 11 FL (ref 9.2–12.9)
POIKILOCYTOSIS BLD QL SMEAR: SLIGHT
POTASSIUM SERPL-SCNC: 4 MMOL/L (ref 3.5–5.1)
PROT SERPL-MCNC: 5.3 GM/DL (ref 6–8.4)
RBC # BLD AUTO: 3.56 M/UL (ref 4.6–6.2)
SODIUM SERPL-SCNC: 139 MMOL/L (ref 136–145)
SPHEROCYTES BLD QL SMEAR: ABNORMAL
TOXIC GRANULES BLD QL SMEAR: PRESENT
UNIT NUMBER: NORMAL
WBC # BLD AUTO: 1 K/UL (ref 3.9–12.7)

## 2025-04-12 PROCEDURE — 63600175 PHARM REV CODE 636 W HCPCS: Performed by: NURSE PRACTITIONER

## 2025-04-12 PROCEDURE — 80053 COMPREHEN METABOLIC PANEL: CPT | Performed by: NURSE PRACTITIONER

## 2025-04-12 PROCEDURE — 20600001 HC STEP DOWN PRIVATE ROOM

## 2025-04-12 PROCEDURE — 85007 BL SMEAR W/DIFF WBC COUNT: CPT | Performed by: INTERNAL MEDICINE

## 2025-04-12 PROCEDURE — 99233 SBSQ HOSP IP/OBS HIGH 50: CPT | Mod: ,,, | Performed by: INTERNAL MEDICINE

## 2025-04-12 PROCEDURE — 63600175 PHARM REV CODE 636 W HCPCS: Performed by: INTERNAL MEDICINE

## 2025-04-12 PROCEDURE — 84100 ASSAY OF PHOSPHORUS: CPT | Performed by: NURSE PRACTITIONER

## 2025-04-12 PROCEDURE — 27000207 HC ISOLATION

## 2025-04-12 PROCEDURE — 25000003 PHARM REV CODE 250: Performed by: INTERNAL MEDICINE

## 2025-04-12 PROCEDURE — 83735 ASSAY OF MAGNESIUM: CPT | Performed by: NURSE PRACTITIONER

## 2025-04-12 PROCEDURE — 25000003 PHARM REV CODE 250: Performed by: NURSE PRACTITIONER

## 2025-04-12 PROCEDURE — 94761 N-INVAS EAR/PLS OXIMETRY MLT: CPT

## 2025-04-12 PROCEDURE — 99900035 HC TECH TIME PER 15 MIN (STAT)

## 2025-04-12 RX ADMIN — ONDANSETRON 8 MG: 2 INJECTION INTRAMUSCULAR; INTRAVENOUS at 10:04

## 2025-04-12 RX ADMIN — ENOXAPARIN SODIUM 40 MG: 40 INJECTION SUBCUTANEOUS at 05:04

## 2025-04-12 RX ADMIN — Medication 1 DOSE: at 08:04

## 2025-04-12 RX ADMIN — ACYCLOVIR 800 MG: 200 CAPSULE ORAL at 08:04

## 2025-04-12 RX ADMIN — Medication 1 DOSE: at 05:04

## 2025-04-12 RX ADMIN — PANTOPRAZOLE SODIUM 40 MG: 40 TABLET, DELAYED RELEASE ORAL at 08:04

## 2025-04-12 RX ADMIN — Medication 1 DOSE: at 01:04

## 2025-04-12 RX ADMIN — LEVOTHYROXINE SODIUM 88 MCG: 88 TABLET ORAL at 06:04

## 2025-04-12 RX ADMIN — LEVOFLOXACIN 500 MG: 500 TABLET, FILM COATED ORAL at 08:04

## 2025-04-12 RX ADMIN — HEPARIN SODIUM 1600 UNITS: 1000 INJECTION INTRAVENOUS; SUBCUTANEOUS at 10:04

## 2025-04-12 RX ADMIN — FLUCONAZOLE 400 MG: 200 TABLET ORAL at 08:04

## 2025-04-12 RX ADMIN — PREGABALIN 75 MG: 75 CAPSULE ORAL at 08:04

## 2025-04-12 RX ADMIN — AMLODIPINE BESYLATE 5 MG: 5 TABLET ORAL at 08:04

## 2025-04-12 RX ADMIN — DULOXETINE HYDROCHLORIDE 20 MG: 20 CAPSULE, DELAYED RELEASE ORAL at 08:04

## 2025-04-12 NOTE — ASSESSMENT & PLAN NOTE
Patient of Dr. Hemphlil  - Coordinator: Xochilt Diaz  - Conditioning Regimen: Melphalan 200 mg/m2  - Cell Dose: 5-10 x 10^6 CD34/kg  - Maintenance: Korina+Rev  - Caregiver: Partner   - [facility]: [facility]   Admitted 4/6/25 for Jennifer 200 Auto. Today is Day +4.  Stem cell infusion completed  4/08/25 at 11:30. Received 5 bags with a total CD34 dose of 4.39 x10^6/kg, tolerated without issue    Planned conditioning regimen:  Melphalan 200 on Day -1     Antimicrobial Prophylaxis:  Acyclovir starting on Day -1  Levofloxacin starting on Day -1  Fluconazole starting on Day -1  Bactrim starting on Day +30     Growth Factor Support:  Neupogen starting on Day +7

## 2025-04-12 NOTE — PLAN OF CARE
Side rails up x2; call bell in place; bed in lowest, locked position; skid proof socks on; no evidence of skin breakdown; care plan explained to patient; pt remains free of injury. Pt is day +3 of an auto SCT. Pt with tolerated po, voids, ambulates. Pt stated in the beginning of shift he had a loose stool at night and in the am. Later in shift pt told me he had 3 BM but that they were soft. Prior to this new info an order was placed by CASSY Fuentes for c diff testing. Pt has had 1 watery stool. Pt instructed to notify nurse if watery stool continues and we will collect for c-diff. Chg nurse Stalin lundberg. Pt with c/o nausea, zofran given. Frequent rounding in progress, pt encouraged to call as needed, VSS and afebrile.

## 2025-04-12 NOTE — SUBJECTIVE & OBJECTIVE
Subjective:     Interval History: D+4 from [id number] AutoSCT for standard-risk MM. No overnight events. VSS. Endorses mild fatigue, one liquid BM yesterday - none today so far. Wife at bedside updated of anticipated clinical stay.     Objective:     Vital Signs (Most Recent):  Temp: 98.3 °F (36.8 °C) (04/12/25 0731)  Pulse: 76 (04/12/25 0731)  Resp: 18 (04/12/25 0731)  BP: 136/87 (04/12/25 0731)  SpO2: (!) 94 % (04/12/25 0731) Vital Signs (24h Range):  Temp:  [97.8 °F (36.6 °C)-98.7 °F (37.1 °C)] 98.3 °F (36.8 °C)  Pulse:  [76-92] 76  Resp:  [18-24] 18  SpO2:  [94 %-99 %] 94 %  BP: (102-136)/(68-87) 136/87     Weight: 99.4 kg (219 lb 2.2 oz)  Body mass index is 32.36 kg/m².  Body surface area is 2.2 meters squared.    ECOG SCORE           [unfilled]    Intake/Output - Last 3 Shifts         04/10 0700  04/11 0659 04/11 0700 04/12 0659 04/12 0700 04/13 0659    P.O. 1425 630 200    I.V. (mL/kg)       Total Intake(mL/kg) 1425 (14.3) 630 (6.3) 200 (2)    Urine (mL/kg/hr) 5 (0)  1300 (4.8)    Emesis/NG output 0      Other 0      Stool 1      Total Output 6  1300    Net +1419 +630 -1100           Urine Occurrence 5 x 6 x     Stool Occurrence 0 x 5 x     Emesis Occurrence 0 x 0 x              Physical Exam  Constitutional:       Appearance: He is well-developed.   HENT:      Head: Normocephalic and atraumatic.      Mouth/Throat:      Pharynx: No oropharyngeal exudate.   Eyes:      General:         Right eye: No discharge.         Left eye: No discharge.      Conjunctiva/sclera: Conjunctivae normal.      Pupils: Pupils are equal, round, and reactive to light.   Cardiovascular:      Rate and Rhythm: Normal rate and regular rhythm.      Heart sounds: Normal heart sounds. No murmur heard.  Pulmonary:      Effort: Pulmonary effort is normal. No respiratory distress.      Breath sounds: Normal breath sounds. No wheezing or rales.   Abdominal:      General: Bowel sounds are normal. There is no distension.      Palpations: Abdomen  is soft.      Tenderness: There is no abdominal tenderness.   Musculoskeletal:         General: No deformity. Normal range of motion.      Cervical back: Normal range of motion and neck supple.   Skin:     General: Skin is warm and dry.      Findings: No erythema or rash.      Comments: Right chest wall vas cath. Dressing c/d/i. No sign of infection to site.   Neurological:      Mental Status: He is alert and oriented to person, place, and time.   Psychiatric:         Behavior: Behavior normal.         Thought Content: Thought content normal.         Judgment: Judgment normal.            Significant Labs:   CBC:   Recent Labs   Lab 04/11/25 0336 04/12/25 0416   WBC 2.10* 1.00*   HGB 10.7* 10.4*   HCT 33.5* 32.3*   * 124*    and CMP:   Recent Labs   Lab 04/11/25 0336 04/12/25 0416    139   K 3.9 4.0    105   CO2 28 28   BUN 11 12   CREATININE 0.7 0.7   CALCIUM 9.1 9.0   ALBUMIN 2.9* 2.9*   BILITOT 0.4 0.3   ALKPHOS 80 77   AST 16 20   ALT 27 24   ANIONGAP 7* 6*       Diagnostic Results:  I have reviewed all pertinent imaging results/findings within the past 24 hours.

## 2025-04-12 NOTE — PLAN OF CARE
- Day +4 Jennifer Auto SCT for MM.   - Pending to collect next stool.  - PRN zofran given*1 for nausea.    Went over POC with pt at beginning of shift.  Questions were asked and answered.  AAO x 4.  Afebrile.  Up to toilet independently. Voiding without difficulty. No complaints of pain.  Pt remained free from injury with bed in low locked position, call light with in reach, non-skid socks, and frequent rounding.  Pt instructed to call for assistance as needed. Denies needs at this time.

## 2025-04-12 NOTE — PLAN OF CARE
Day +4 Jennifer Auto SCT for MM. Patient AAOX4, VSS, afebrile, on room air. Patient had no concerns over night. Dressing change complete. Patient up to the bathroom independently, free from falls and injuries. I&O's monitored as ordered. Bed in lowest position, side rails up x2, non-skid socks on, call light in reach. Bed alarm refused, patient educated on importance and verbalized understanding. Patient educated to use call light for any needs, patient verbalizes understanding. There are no concerns at this time. Plan of care on going.

## 2025-04-12 NOTE — PROGRESS NOTES
[facility] - Oncology ([facility])  Hematology  Bone Marrow Transplant  Progress Note    Patient Name: Mitchel Joya  Admission Date: 4/6/2025  Hospital Length of Stay: 6 days  Code Status: Full Code    Subjective:     Interval History: D+4 from [id number] AutoSCT for standard-risk MM. No overnight events. VSS. Endorses mild fatigue, one liquid BM yesterday - none today so far. Wife at bedside updated of anticipated clinical stay.     Objective:     Vital Signs (Most Recent):  Temp: 98.3 °F (36.8 °C) (04/12/25 0731)  Pulse: 76 (04/12/25 0731)  Resp: 18 (04/12/25 0731)  BP: 136/87 (04/12/25 0731)  SpO2: (!) 94 % (04/12/25 0731) Vital Signs (24h Range):  Temp:  [97.8 °F (36.6 °C)-98.7 °F (37.1 °C)] 98.3 °F (36.8 °C)  Pulse:  [76-92] 76  Resp:  [18-24] 18  SpO2:  [94 %-99 %] 94 %  BP: (102-136)/(68-87) 136/87     Weight: 99.4 kg (219 lb 2.2 oz)  Body mass index is 32.36 kg/m².  Body surface area is 2.2 meters squared.    ECOG SCORE           [unfilled]    Intake/Output - Last 3 Shifts         04/10 0700  04/11 0659 04/11 0700 04/12 0659 04/12 0700 04/13 0659    P.O. 1425 630 200    I.V. (mL/kg)       Total Intake(mL/kg) 1425 (14.3) 630 (6.3) 200 (2)    Urine (mL/kg/hr) 5 (0)  1300 (4.8)    Emesis/NG output 0      Other 0      Stool 1      Total Output 6  1300    Net +1419 +630 -1100           Urine Occurrence 5 x 6 x     Stool Occurrence 0 x 5 x     Emesis Occurrence 0 x 0 x              Physical Exam  Constitutional:       Appearance: He is well-developed.   HENT:      Head: Normocephalic and atraumatic.      Mouth/Throat:      Pharynx: No oropharyngeal exudate.   Eyes:      General:         Right eye: No discharge.         Left eye: No discharge.      Conjunctiva/sclera: Conjunctivae normal.      Pupils: Pupils are equal, round, and reactive to light.   Cardiovascular:      Rate and Rhythm: Normal rate and regular rhythm.      Heart sounds: Normal heart sounds. No murmur heard.  Pulmonary:      Effort: Pulmonary effort is  normal. No respiratory distress.      Breath sounds: Normal breath sounds. No wheezing or rales.   Abdominal:      General: Bowel sounds are normal. There is no distension.      Palpations: Abdomen is soft.      Tenderness: There is no abdominal tenderness.   Musculoskeletal:         General: No deformity. Normal range of motion.      Cervical back: Normal range of motion and neck supple.   Skin:     General: Skin is warm and dry.      Findings: No erythema or rash.      Comments: Right chest wall vas cath. Dressing c/d/i. No sign of infection to site.   Neurological:      Mental Status: He is alert and oriented to person, place, and time.   Psychiatric:         Behavior: Behavior normal.         Thought Content: Thought content normal.         Judgment: Judgment normal.            Significant Labs:   CBC:   Recent Labs   Lab 04/11/25  0336 04/12/25  0416   WBC 2.10* 1.00*   HGB 10.7* 10.4*   HCT 33.5* 32.3*   * 124*    and CMP:   Recent Labs   Lab 04/11/25  0336 04/12/25  0416    139   K 3.9 4.0    105   CO2 28 28   BUN 11 12   CREATININE 0.7 0.7   CALCIUM 9.1 9.0   ALBUMIN 2.9* 2.9*   BILITOT 0.4 0.3   ALKPHOS 80 77   AST 16 20   ALT 27 24   ANIONGAP 7* 6*       Diagnostic Results:  I have reviewed all pertinent imaging results/findings within the past 24 hours.  Assessment/Plan:     * History of autologous stem cell transplant  Patient of Dr. Hemphill  - Coordinator: Xochilt Diaz  - Conditioning Regimen: Melphalan 200 mg/m2  - Cell Dose: 5-10 x 10^6 CD34/kg  - Maintenance: Korina+Rev  - Caregiver: Partner   - [facility]: [facility]   Admitted 4/6/25 for Jennifer 200 Auto. Today is Day +4.  Stem cell infusion completed  4/08/25 at 11:30. Received 5 bags with a total CD34 dose of 4.39 x10^6/kg, tolerated without issue    Planned conditioning regimen:  Melphalan 200 on Day -1     Antimicrobial Prophylaxis:  Acyclovir starting on Day -1  Levofloxacin starting on Day -1  Fluconazole starting on Day  -1  Bactrim starting on Day +30     Growth Factor Support:  Neupogen starting on Day +7     Thrombocytopenia  - Anticipate pancytopenia following chemotherapy.  - Transfuse for plts < 10K.  - Daily CBC while IP.    Anemia due to chemotherapy  - Anticipate pancytopenia following chemotherapy.  - Transfuse for hgb < 7.  - Daily CBC while IP.    Immunocompromised state associated with stem cell transplant  - See Multiple myeloma not having achieved remission   - See Autologous stem cell transplant     Neoplastic (malignant) related fatigue  - PT/OT consulted per BMT protocol    At risk for malnutrition  - RD/Nutrition consulted per BMT protocol    Elevated BP without diagnosis of hypertension  - BP consistently high normal, not on antihypertensives at home.   - Monitoring closely, low threshold to start antihypertensives.  - PRN hydralazine available.  - Stopped IVF 4/9. Will resume with reduced oral intake or with fluid losses through diarrhea or emesis.  - Improved off IVF.    Immunosuppressed due to chemotherapy  - Will start ppx levaquin and diflucan   - On ppx Valtrex at home, no reported history of shingles. Transitioned to ppx acyclovir.     Hypercoagulable state  - On daily ASA at home, will hold while inpatient  - Ppx lovenox until platelets <50k    Hypothyroid  - Continue home synthroid    Hyperlipidemia  - Holding home crestor while inpatient     Peripheral neuropathy  - Continue home Lyrica    Multiple myeloma not having achieved remission  Treatment history:  10/29/24: C1 Korina Vrd (notes state velcade has been held due to neuropathy and lenalidomide has been held due to cytopenias), remained on this until pre-transplant restaging  11/15/24: Palliative radiation to right hip    Restaging:  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  SPEP with 0.16 g/dL and 0.12 g/dL. FLCs normal.   PET/CT (3/7/25) - no new hypermetabolic  lesions.    See Autologous Stem cell transplant     Obesity (BMI 30.0-34.9)  Body mass index is 32.36 kg/m². Morbid obesity complicates all aspects of disease management from diagnostic modalities to treatment. Weight loss encouraged and health benefits explained to patient.          VTE Risk Mitigation (From admission, onward)           Ordered     enoxaparin injection 40 mg  Every 24 hours         04/07/25 1333     heparin, porcine (PF) 100 unit/mL injection flush 300 Units  As needed (PRN)         04/06/25 2212     heparin (porcine) injection 1,600 Units  As needed (PRN)         04/06/25 2203     IP VTE HIGH RISK PATIENT  Once         04/06/25 1824     Place sequential compression device  Until discontinued         04/06/25 1824                    Manuel Bernstein MD  Bone Marrow Transplant  [facility] - Oncology ([facility])

## 2025-04-13 LAB
ABSOLUTE EOSINOPHIL (OHS): 0.07 K/UL
ABSOLUTE MONOCYTE (OHS): 0.02 K/UL (ref 0.3–1)
ABSOLUTE NEUTROPHIL COUNT (OHS): 0.77 K/UL (ref 1.8–7.7)
ALBUMIN SERPL BCP-MCNC: 2.9 G/DL (ref 3.5–5.2)
ALP SERPL-CCNC: 75 UNIT/L (ref 40–150)
ALT SERPL W/O P-5'-P-CCNC: 23 UNIT/L (ref 10–44)
ANION GAP (OHS): 6 MMOL/L (ref 8–16)
AST SERPL-CCNC: 17 UNIT/L (ref 11–45)
BASOPHILS # BLD AUTO: 0 K/UL
BASOPHILS NFR BLD AUTO: 0 %
BILIRUB SERPL-MCNC: 0.4 MG/DL (ref 0.1–1)
BUN SERPL-MCNC: 11 MG/DL (ref 8–23)
CALCIUM SERPL-MCNC: 8.9 MG/DL (ref 8.7–10.5)
CHLORIDE SERPL-SCNC: 107 MMOL/L (ref 95–110)
CO2 SERPL-SCNC: 28 MMOL/L (ref 23–29)
CREAT SERPL-MCNC: 0.7 MG/DL (ref 0.5–1.4)
ERYTHROCYTE [DISTWIDTH] IN BLOOD BY AUTOMATED COUNT: 17.9 % (ref 11.5–14.5)
GFR SERPLBLD CREATININE-BSD FMLA CKD-EPI: >60 ML/MIN/1.73/M2
GLUCOSE SERPL-MCNC: 112 MG/DL (ref 70–110)
HCT VFR BLD AUTO: 33.2 % (ref 40–54)
HGB BLD-MCNC: 10.7 GM/DL (ref 14–18)
IMM GRANULOCYTES # BLD AUTO: 0.01 K/UL (ref 0–0.04)
IMM GRANULOCYTES NFR BLD AUTO: 1.1 % (ref 0–0.5)
INDIRECT COOMBS: NORMAL
LYMPHOCYTES # BLD AUTO: 0.06 K/UL (ref 1–4.8)
MAGNESIUM SERPL-MCNC: 2.1 MG/DL (ref 1.6–2.6)
MCH RBC QN AUTO: 29.5 PG (ref 27–31)
MCHC RBC AUTO-ENTMCNC: 32.2 G/DL (ref 32–36)
MCV RBC AUTO: 92 FL (ref 82–98)
NUCLEATED RBC (/100WBC) (OHS): 0 /100 WBC
PHOSPHATE SERPL-MCNC: 3.2 MG/DL (ref 2.7–4.5)
PLATELET # BLD AUTO: 103 K/UL (ref 150–450)
PMV BLD AUTO: 10.4 FL (ref 9.2–12.9)
POTASSIUM SERPL-SCNC: 3.8 MMOL/L (ref 3.5–5.1)
PROT SERPL-MCNC: 5.5 GM/DL (ref 6–8.4)
RBC # BLD AUTO: 3.63 M/UL (ref 4.6–6.2)
RELATIVE EOSINOPHIL (OHS): 7.5 %
RELATIVE LYMPHOCYTE (OHS): 6.5 % (ref 18–48)
RELATIVE MONOCYTE (OHS): 2.2 % (ref 4–15)
RELATIVE NEUTROPHIL (OHS): 82.7 % (ref 38–73)
RH BLD: NORMAL
SODIUM SERPL-SCNC: 141 MMOL/L (ref 136–145)
SPECIMEN OUTDATE: NORMAL
WBC # BLD AUTO: 0.93 K/UL (ref 3.9–12.7)

## 2025-04-13 PROCEDURE — 25000003 PHARM REV CODE 250: Performed by: INTERNAL MEDICINE

## 2025-04-13 PROCEDURE — 85025 COMPLETE CBC W/AUTO DIFF WBC: CPT | Performed by: INTERNAL MEDICINE

## 2025-04-13 PROCEDURE — 80053 COMPREHEN METABOLIC PANEL: CPT | Performed by: NURSE PRACTITIONER

## 2025-04-13 PROCEDURE — 99233 SBSQ HOSP IP/OBS HIGH 50: CPT | Mod: ,,, | Performed by: INTERNAL MEDICINE

## 2025-04-13 PROCEDURE — 86850 RBC ANTIBODY SCREEN: CPT | Performed by: NURSE PRACTITIONER

## 2025-04-13 PROCEDURE — 84100 ASSAY OF PHOSPHORUS: CPT | Performed by: NURSE PRACTITIONER

## 2025-04-13 PROCEDURE — 94761 N-INVAS EAR/PLS OXIMETRY MLT: CPT

## 2025-04-13 PROCEDURE — 63600175 PHARM REV CODE 636 W HCPCS: Performed by: NURSE PRACTITIONER

## 2025-04-13 PROCEDURE — 99900035 HC TECH TIME PER 15 MIN (STAT)

## 2025-04-13 PROCEDURE — 87324 CLOSTRIDIUM AG IA: CPT | Performed by: INTERNAL MEDICINE

## 2025-04-13 PROCEDURE — 25000003 PHARM REV CODE 250: Performed by: NURSE PRACTITIONER

## 2025-04-13 PROCEDURE — 20600001 HC STEP DOWN PRIVATE ROOM

## 2025-04-13 PROCEDURE — 83735 ASSAY OF MAGNESIUM: CPT | Performed by: NURSE PRACTITIONER

## 2025-04-13 PROCEDURE — 27000207 HC ISOLATION

## 2025-04-13 PROCEDURE — 63600175 PHARM REV CODE 636 W HCPCS: Performed by: INTERNAL MEDICINE

## 2025-04-13 RX ADMIN — Medication 6 MG: at 08:04

## 2025-04-13 RX ADMIN — ENOXAPARIN SODIUM 40 MG: 40 INJECTION SUBCUTANEOUS at 05:04

## 2025-04-13 RX ADMIN — HEPARIN SODIUM 1600 UNITS: 1000 INJECTION INTRAVENOUS; SUBCUTANEOUS at 11:04

## 2025-04-13 RX ADMIN — AMLODIPINE BESYLATE 5 MG: 5 TABLET ORAL at 08:04

## 2025-04-13 RX ADMIN — PREGABALIN 75 MG: 75 CAPSULE ORAL at 08:04

## 2025-04-13 RX ADMIN — ACYCLOVIR 800 MG: 200 CAPSULE ORAL at 08:04

## 2025-04-13 RX ADMIN — LEVOTHYROXINE SODIUM 88 MCG: 88 TABLET ORAL at 05:04

## 2025-04-13 RX ADMIN — LEVOFLOXACIN 500 MG: 500 TABLET, FILM COATED ORAL at 08:04

## 2025-04-13 RX ADMIN — PANTOPRAZOLE SODIUM 40 MG: 40 TABLET, DELAYED RELEASE ORAL at 08:04

## 2025-04-13 RX ADMIN — DULOXETINE HYDROCHLORIDE 20 MG: 20 CAPSULE, DELAYED RELEASE ORAL at 08:04

## 2025-04-13 RX ADMIN — PROCHLORPERAZINE EDISYLATE 10 MG: 5 INJECTION INTRAMUSCULAR; INTRAVENOUS at 11:04

## 2025-04-13 RX ADMIN — Medication 1 DOSE: at 08:04

## 2025-04-13 RX ADMIN — POTASSIUM CHLORIDE 20 MEQ: 1500 TABLET, EXTENDED RELEASE ORAL at 05:04

## 2025-04-13 RX ADMIN — FLUCONAZOLE 400 MG: 200 TABLET ORAL at 08:04

## 2025-04-13 RX ADMIN — HEPARIN SODIUM 1600 UNITS: 1000 INJECTION INTRAVENOUS; SUBCUTANEOUS at 03:04

## 2025-04-13 RX ADMIN — Medication 1 DOSE: at 05:04

## 2025-04-13 RX ADMIN — Medication 1 DOSE: at 01:04

## 2025-04-13 NOTE — PLAN OF CARE
Day +5 Jennifer Auto SCT for MM. Patient AAOX4, VSS, afebrile, on room air. Patient had no concerns over night. Patient up to the bathroom independently, free from falls and injuries. I&O's monitored as ordered. Bed in lowest position, side rails up x2, non-skid socks on, call light in reach. Bed alarm refused, patient educated on importance and verbalized understanding. Patient educated to use call light for any needs, patient verbalizes understanding. There are no concerns at this time. Plan of care on going.    Potassium 3.8, electrolytes scheduled and replaced.

## 2025-04-13 NOTE — PLAN OF CARE
- Day +5 Jennifer Auto SCT for MM.   - Cdiff stool sample sent, result pending.  - Pt said feels zofran is not working for his nausea, PRN compazine given*1 for nausea with full relief.     Went over POC with pt at beginning of shift.  Questions were asked and answered.  AAO x 4.  VSS.  Up to toilet independently. Voiding without difficulty. No complaints of pain.  Pt remained free from injury with bed in low locked position, call light with in reach, non-skid socks, and frequent rounding.  Pt instructed to call for assistance as needed. Denies needs at this time.

## 2025-04-13 NOTE — ASSESSMENT & PLAN NOTE
Patient of Dr. Hemphill  - Coordinator: Xochilt Diaz  - Conditioning Regimen: Melphalan 200 mg/m2  - Cell Dose: 5-10 x 10^6 CD34/kg  - Maintenance: Korina+Rev  - Caregiver: Partner   - [facility]: [facility]   Admitted 4/6/25 for Jennifer 200 Auto. Today is Day +5.  Stem cell infusion completed  4/08/25 at 11:30. Received 5 bags with a total CD34 dose of 4.39 x10^6/kg, tolerated without issue    Planned conditioning regimen:  Melphalan 200 on Day -1     Antimicrobial Prophylaxis:  Acyclovir starting on Day -1  Levofloxacin starting on Day -1  Fluconazole starting on Day -1  Bactrim starting on Day +30     Growth Factor Support:  Neupogen starting on Day +7

## 2025-04-13 NOTE — SUBJECTIVE & OBJECTIVE
Subjective:     Interval History: D+5 from [id number] AutoSCT for standard-risk MM. No overnight events. VSS. Overall tolerating well, having BM but none are liquid.     Objective:     Vital Signs (Most Recent):  Temp: 98.5 °F (36.9 °C) (04/13/25 0356)  Pulse: 89 (04/13/25 0356)  Resp: 20 (04/13/25 0356)  BP: 116/82 (04/13/25 0356)  SpO2: 96 % (04/13/25 0356) Vital Signs (24h Range):  Temp:  [98.1 °F (36.7 °C)-98.5 °F (36.9 °C)] 98.5 °F (36.9 °C)  Pulse:  [83-90] 89  Resp:  [18-20] 20  SpO2:  [95 %-98 %] 96 %  BP: (100-123)/(65-82) 116/82     Weight: 97.7 kg (215 lb 8 oz)  Body mass index is 31.82 kg/m².  Body surface area is 2.18 meters squared.    ECOG SCORE           [unfilled]    Intake/Output - Last 3 Shifts         04/11 0700  04/12 0659 04/12 0700  04/13 0659 04/13 0700  04/14 0659    P.O. 630 350 200    Total Intake(mL/kg) 630 (6.3) 350 (3.6) 200 (2)    Urine (mL/kg/hr)  1600 (0.7) 250 (1.1)    Emesis/NG output  0     Other  0     Stool  0 0    Total Output  1600 250    Net +630 -1250 -50           Urine Occurrence 6 x 2 x     Stool Occurrence 5 x 5 x 2 x    Emesis Occurrence 0 x 0 x              Physical Exam  Constitutional:       Appearance: He is well-developed.   HENT:      Head: Normocephalic and atraumatic.      Mouth/Throat:      Pharynx: No oropharyngeal exudate.   Eyes:      General:         Right eye: No discharge.         Left eye: No discharge.      Conjunctiva/sclera: Conjunctivae normal.      Pupils: Pupils are equal, round, and reactive to light.   Cardiovascular:      Rate and Rhythm: Normal rate and regular rhythm.      Heart sounds: Normal heart sounds. No murmur heard.  Pulmonary:      Effort: Pulmonary effort is normal. No respiratory distress.      Breath sounds: Normal breath sounds. No wheezing or rales.   Abdominal:      General: Bowel sounds are normal. There is no distension.      Palpations: Abdomen is soft.      Tenderness: There is no abdominal tenderness.   Musculoskeletal:          General: No deformity. Normal range of motion.      Cervical back: Normal range of motion and neck supple.   Skin:     General: Skin is warm and dry.      Findings: No erythema or rash.      Comments: Right chest wall vas cath. Dressing c/d/i. No sign of infection to site.   Neurological:      Mental Status: He is alert and oriented to person, place, and time.   Psychiatric:         Behavior: Behavior normal.         Thought Content: Thought content normal.         Judgment: Judgment normal.            Significant Labs:   CBC:   Recent Labs   Lab 04/12/25 0416 04/13/25 0413   WBC 1.00* 0.93*   HGB 10.4* 10.7*   HCT 32.3* 33.2*   * 103*    and CMP:   Recent Labs   Lab 04/12/25 0416 04/13/25 0413    141   K 4.0 3.8    107   CO2 28 28   BUN 12 11   CREATININE 0.7 0.7   CALCIUM 9.0 8.9   ALBUMIN 2.9* 2.9*   BILITOT 0.3 0.4   ALKPHOS 77 75   AST 20 17   ALT 24 23   ANIONGAP 6* 6*       Diagnostic Results:  I have reviewed all pertinent imaging results/findings within the past 24 hours.

## 2025-04-13 NOTE — PROGRESS NOTES
[facility]claughlin - Oncology ([facility])  Hematology  Bone Marrow Transplant  Progress Note    Patient Name: Mitchel Joya  Admission Date: 4/6/2025  Hospital Length of Stay: 7 days  Code Status: Full Code    Subjective:     Interval History: D+5 from [id number] AutoSCT for standard-risk MM. No overnight events. VSS. Overall tolerating well, having BM but none are liquid.     Objective:     Vital Signs (Most Recent):  Temp: 98.5 °F (36.9 °C) (04/13/25 0356)  Pulse: 89 (04/13/25 0356)  Resp: 20 (04/13/25 0356)  BP: 116/82 (04/13/25 0356)  SpO2: 96 % (04/13/25 0356) Vital Signs (24h Range):  Temp:  [98.1 °F (36.7 °C)-98.5 °F (36.9 °C)] 98.5 °F (36.9 °C)  Pulse:  [83-90] 89  Resp:  [18-20] 20  SpO2:  [95 %-98 %] 96 %  BP: (100-123)/(65-82) 116/82     Weight: 97.7 kg (215 lb 8 oz)  Body mass index is 31.82 kg/m².  Body surface area is 2.18 meters squared.    ECOG SCORE           [unfilled]    Intake/Output - Last 3 Shifts         04/11 0700  04/12 0659 04/12 0700  04/13 0659 04/13 0700  04/14 0659    P.O. 630 350 200    Total Intake(mL/kg) 630 (6.3) 350 (3.6) 200 (2)    Urine (mL/kg/hr)  1600 (0.7) 250 (1.1)    Emesis/NG output  0     Other  0     Stool  0 0    Total Output  1600 250    Net +630 -1250 -50           Urine Occurrence 6 x 2 x     Stool Occurrence 5 x 5 x 2 x    Emesis Occurrence 0 x 0 x              Physical Exam  Constitutional:       Appearance: He is well-developed.   HENT:      Head: Normocephalic and atraumatic.      Mouth/Throat:      Pharynx: No oropharyngeal exudate.   Eyes:      General:         Right eye: No discharge.         Left eye: No discharge.      Conjunctiva/sclera: Conjunctivae normal.      Pupils: Pupils are equal, round, and reactive to light.   Cardiovascular:      Rate and Rhythm: Normal rate and regular rhythm.      Heart sounds: Normal heart sounds. No murmur heard.  Pulmonary:      Effort: Pulmonary effort is normal. No respiratory distress.      Breath sounds: Normal breath sounds. No wheezing  or rales.   Abdominal:      General: Bowel sounds are normal. There is no distension.      Palpations: Abdomen is soft.      Tenderness: There is no abdominal tenderness.   Musculoskeletal:         General: No deformity. Normal range of motion.      Cervical back: Normal range of motion and neck supple.   Skin:     General: Skin is warm and dry.      Findings: No erythema or rash.      Comments: Right chest wall vas cath. Dressing c/d/i. No sign of infection to site.   Neurological:      Mental Status: He is alert and oriented to person, place, and time.   Psychiatric:         Behavior: Behavior normal.         Thought Content: Thought content normal.         Judgment: Judgment normal.            Significant Labs:   CBC:   Recent Labs   Lab 04/12/25  0416 04/13/25  0413   WBC 1.00* 0.93*   HGB 10.4* 10.7*   HCT 32.3* 33.2*   * 103*    and CMP:   Recent Labs   Lab 04/12/25 0416 04/13/25  0413    141   K 4.0 3.8    107   CO2 28 28   BUN 12 11   CREATININE 0.7 0.7   CALCIUM 9.0 8.9   ALBUMIN 2.9* 2.9*   BILITOT 0.3 0.4   ALKPHOS 77 75   AST 20 17   ALT 24 23   ANIONGAP 6* 6*       Diagnostic Results:  I have reviewed all pertinent imaging results/findings within the past 24 hours.  Assessment/Plan:     * History of autologous stem cell transplant  Patient of Dr. Hemphill  - Coordinator: Xochilt Diaz  - Conditioning Regimen: Melphalan 200 mg/m2  - Cell Dose: 5-10 x 10^6 CD34/kg  - Maintenance: Korina+Rev  - Caregiver: Partner   - [facility]: [facility]   Admitted 4/6/25 for Jennifer 200 Auto. Today is Day +5.  Stem cell infusion completed  4/08/25 at 11:30. Received 5 bags with a total CD34 dose of 4.39 x10^6/kg, tolerated without issue    Planned conditioning regimen:  Melphalan 200 on Day -1     Antimicrobial Prophylaxis:  Acyclovir starting on Day -1  Levofloxacin starting on Day -1  Fluconazole starting on Day -1  Bactrim starting on Day +30     Growth Factor Support:  Neupogen starting on Day +7      Thrombocytopenia  - Anticipate pancytopenia following chemotherapy.  - Transfuse for plts < 10K.  - Daily CBC while IP.    Anemia due to chemotherapy  - Anticipate pancytopenia following chemotherapy.  - Transfuse for hgb < 7.  - Daily CBC while IP.    Immunocompromised state associated with stem cell transplant  - See Multiple myeloma not having achieved remission   - See Autologous stem cell transplant     Neoplastic (malignant) related fatigue  - PT/OT consulted per BMT protocol    At risk for malnutrition  - RD/Nutrition consulted per BMT protocol    Elevated BP without diagnosis of hypertension  - BP consistently high normal, not on antihypertensives at home.   - Monitoring closely, low threshold to start antihypertensives.  - PRN hydralazine available.  - Stopped IVF 4/9. Will resume with reduced oral intake or with fluid losses through diarrhea or emesis.  - Improved off IVF.    Immunosuppressed due to chemotherapy  - Will start ppx levaquin and diflucan   - On ppx Valtrex at home, no reported history of shingles. Transitioned to ppx acyclovir.     Hypercoagulable state  - On daily ASA at home, will hold while inpatient  - Ppx lovenox until platelets <50k    Hypothyroid  - Continue home synthroid    Hyperlipidemia  - Holding home crestor while inpatient     Peripheral neuropathy  - Continue home Lyrica    Multiple myeloma not having achieved remission  Treatment history:  10/29/24: C1 Korina Vrd (notes state velcade has been held due to neuropathy and lenalidomide has been held due to cytopenias), remained on this until pre-transplant restaging  11/15/24: Palliative radiation to right hip    Restaging:  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  SPEP with 0.16 g/dL and 0.12 g/dL. FLCs normal.   PET/CT (3/7/25) - no new hypermetabolic lesions.    See Autologous Stem cell transplant     Obesity (BMI 30.0-34.9)  Body mass index  is 32.36 kg/m². Morbid obesity complicates all aspects of disease management from diagnostic modalities to treatment. Weight loss encouraged and health benefits explained to patient.          VTE Risk Mitigation (From admission, onward)           Ordered     enoxaparin injection 40 mg  Every 24 hours         04/07/25 1333     heparin, porcine (PF) 100 unit/mL injection flush 300 Units  As needed (PRN)         04/06/25 2212     heparin (porcine) injection 1,600 Units  As needed (PRN)         04/06/25 2203     IP VTE HIGH RISK PATIENT  Once         04/06/25 1824     Place sequential compression device  Until discontinued         04/06/25 1824                    Manuel Bernstein MD  Bone Marrow Transplant  [address] - Oncology ([facility])

## 2025-04-14 PROBLEM — K52.1 CHEMOTHERAPY-INDUCED DIARRHEA: Status: ACTIVE | Noted: 2025-04-14

## 2025-04-14 PROBLEM — T45.1X5A CHEMOTHERAPY-INDUCED DIARRHEA: Status: ACTIVE | Noted: 2025-04-14

## 2025-04-14 PROBLEM — D61.810 PANCYTOPENIA DUE TO CHEMOTHERAPY: Status: ACTIVE | Noted: 2025-04-09

## 2025-04-14 LAB
ABSOLUTE NEUTROPHIL MANUAL (OHS): 0.4 K/UL
ALBUMIN SERPL BCP-MCNC: 2.9 G/DL (ref 3.5–5.2)
ALP SERPL-CCNC: 73 UNIT/L (ref 40–150)
ALT SERPL W/O P-5'-P-CCNC: 21 UNIT/L (ref 10–44)
ANION GAP (OHS): 6 MMOL/L (ref 8–16)
ANISOCYTOSIS BLD QL SMEAR: SLIGHT
AST SERPL-CCNC: 15 UNIT/L (ref 11–45)
BASOPHILS NFR BLD MANUAL: 1.5 %
BILIRUB SERPL-MCNC: 0.3 MG/DL (ref 0.1–1)
BUN SERPL-MCNC: 11 MG/DL (ref 8–23)
C DIFF GDH STL QL: NEGATIVE
C DIFF TOX A+B STL QL IA: NEGATIVE
CALCIUM SERPL-MCNC: 8.9 MG/DL (ref 8.7–10.5)
CHLORIDE SERPL-SCNC: 107 MMOL/L (ref 95–110)
CO2 SERPL-SCNC: 27 MMOL/L (ref 23–29)
CREAT SERPL-MCNC: 0.7 MG/DL (ref 0.5–1.4)
EOSINOPHIL NFR BLD MANUAL: 4.5 % (ref 0–8)
ERYTHROCYTE [DISTWIDTH] IN BLOOD BY AUTOMATED COUNT: 17.7 % (ref 11.5–14.5)
GFR SERPLBLD CREATININE-BSD FMLA CKD-EPI: >60 ML/MIN/1.73/M2
GLUCOSE SERPL-MCNC: 110 MG/DL (ref 70–110)
HCT VFR BLD AUTO: 33.1 % (ref 40–54)
HGB BLD-MCNC: 10.7 GM/DL (ref 14–18)
LYMPHOCYTES NFR BLD MANUAL: 6 % (ref 18–48)
MAGNESIUM SERPL-MCNC: 2.1 MG/DL (ref 1.6–2.6)
MCH RBC QN AUTO: 29.9 PG (ref 27–31)
MCHC RBC AUTO-ENTMCNC: 32.3 G/DL (ref 32–36)
MCV RBC AUTO: 93 FL (ref 82–98)
MONOCYTES NFR BLD MANUAL: 1.5 % (ref 4–15)
NEUTROPHILS NFR BLD MANUAL: 86.6 % (ref 38–73)
NUCLEATED RBC (/100WBC) (OHS): 0 /100 WBC
PHOSPHATE SERPL-MCNC: 3.2 MG/DL (ref 2.7–4.5)
PLATELET # BLD AUTO: 81 K/UL (ref 150–450)
PLATELET BLD QL SMEAR: ABNORMAL
PMV BLD AUTO: 10.1 FL (ref 9.2–12.9)
POIKILOCYTOSIS BLD QL SMEAR: SLIGHT
POTASSIUM SERPL-SCNC: 3.8 MMOL/L (ref 3.5–5.1)
PROT SERPL-MCNC: 5.3 GM/DL (ref 6–8.4)
RBC # BLD AUTO: 3.58 M/UL (ref 4.6–6.2)
SODIUM SERPL-SCNC: 140 MMOL/L (ref 136–145)
SPHEROCYTES BLD QL SMEAR: ABNORMAL
WBC # BLD AUTO: 0.46 K/UL (ref 3.9–12.7)

## 2025-04-14 PROCEDURE — 97116 GAIT TRAINING THERAPY: CPT | Mod: CQ

## 2025-04-14 PROCEDURE — 63600175 PHARM REV CODE 636 W HCPCS: Performed by: NURSE PRACTITIONER

## 2025-04-14 PROCEDURE — 82040 ASSAY OF SERUM ALBUMIN: CPT | Performed by: NURSE PRACTITIONER

## 2025-04-14 PROCEDURE — 25000003 PHARM REV CODE 250: Performed by: NURSE PRACTITIONER

## 2025-04-14 PROCEDURE — 99233 SBSQ HOSP IP/OBS HIGH 50: CPT | Mod: ,,, | Performed by: INTERNAL MEDICINE

## 2025-04-14 PROCEDURE — 25000003 PHARM REV CODE 250

## 2025-04-14 PROCEDURE — 20600001 HC STEP DOWN PRIVATE ROOM

## 2025-04-14 PROCEDURE — 25000003 PHARM REV CODE 250: Performed by: INTERNAL MEDICINE

## 2025-04-14 PROCEDURE — 63600175 PHARM REV CODE 636 W HCPCS: Performed by: INTERNAL MEDICINE

## 2025-04-14 PROCEDURE — 83735 ASSAY OF MAGNESIUM: CPT | Performed by: NURSE PRACTITIONER

## 2025-04-14 PROCEDURE — 84100 ASSAY OF PHOSPHORUS: CPT | Performed by: NURSE PRACTITIONER

## 2025-04-14 PROCEDURE — 85007 BL SMEAR W/DIFF WBC COUNT: CPT | Performed by: INTERNAL MEDICINE

## 2025-04-14 RX ORDER — LOPERAMIDE HYDROCHLORIDE 2 MG/1
2 CAPSULE ORAL 4 TIMES DAILY PRN
Status: DISCONTINUED | OUTPATIENT
Start: 2025-04-14 | End: 2025-04-15

## 2025-04-14 RX ADMIN — ENOXAPARIN SODIUM 40 MG: 40 INJECTION SUBCUTANEOUS at 04:04

## 2025-04-14 RX ADMIN — AMLODIPINE BESYLATE 5 MG: 5 TABLET ORAL at 08:04

## 2025-04-14 RX ADMIN — LOPERAMIDE HYDROCHLORIDE 2 MG: 2 CAPSULE ORAL at 08:04

## 2025-04-14 RX ADMIN — ACYCLOVIR 800 MG: 200 CAPSULE ORAL at 08:04

## 2025-04-14 RX ADMIN — LEVOTHYROXINE SODIUM 88 MCG: 88 TABLET ORAL at 06:04

## 2025-04-14 RX ADMIN — LOPERAMIDE HYDROCHLORIDE 2 MG: 2 CAPSULE ORAL at 04:04

## 2025-04-14 RX ADMIN — Medication 1 DOSE: at 08:04

## 2025-04-14 RX ADMIN — POTASSIUM CHLORIDE 20 MEQ: 1500 TABLET, EXTENDED RELEASE ORAL at 06:04

## 2025-04-14 RX ADMIN — DULOXETINE HYDROCHLORIDE 20 MG: 20 CAPSULE, DELAYED RELEASE ORAL at 08:04

## 2025-04-14 RX ADMIN — FLUCONAZOLE 400 MG: 200 TABLET ORAL at 08:04

## 2025-04-14 RX ADMIN — PROCHLORPERAZINE EDISYLATE 10 MG: 5 INJECTION INTRAMUSCULAR; INTRAVENOUS at 01:04

## 2025-04-14 RX ADMIN — PREGABALIN 75 MG: 75 CAPSULE ORAL at 08:04

## 2025-04-14 RX ADMIN — LEVOFLOXACIN 500 MG: 500 TABLET, FILM COATED ORAL at 08:04

## 2025-04-14 RX ADMIN — PANTOPRAZOLE SODIUM 40 MG: 40 TABLET, DELAYED RELEASE ORAL at 08:04

## 2025-04-14 RX ADMIN — Medication 1 DOSE: at 04:04

## 2025-04-14 RX ADMIN — HEPARIN SODIUM 1600 UNITS: 1000 INJECTION INTRAVENOUS; SUBCUTANEOUS at 01:04

## 2025-04-14 RX ADMIN — Medication 1 DOSE: at 12:04

## 2025-04-14 NOTE — PLAN OF CARE
- Day +6 Jennifer Auto SCT for MM.   - Watery BM *3, PRN imodium given *2.  - Pt said feels zofran is not working for his nausea, PRN compazine given*1 for nausea with full relief.     Went over POC with pt at beginning of shift.  Questions were asked and answered.  AAO x 4.  VSS.  Up to toilet independently. Voiding without difficulty. No complaints of pain.  Pt remained free from injury with bed in low locked position, call light with in reach, non-skid socks, and frequent rounding.  Pt instructed to call for assistance as needed. Denies needs at this time.

## 2025-04-14 NOTE — ASSESSMENT & PLAN NOTE
- Body mass index is 31.64 kg/m². Morbid obesity complicates all aspects of disease management from diagnostic modalities to treatment. Weight loss encouraged and health benefits explained to patient.

## 2025-04-14 NOTE — PROGRESS NOTES
[facility]claughlin - Oncology ([facility])  Adult Nutrition  Progress Note    SUMMARY       Recommendations    --Recommend weekly weights    --Continue Regular diet as tolerated and clinically indicated.     --Will order Boost [facility] Chocolate daily to aid in calorie and protein intake  --Nursing: please continue to document % meal eaten on flowsheets    --RD to monitor weight, po intake    --Encourage good intakes    Goals:   1.  75% nutritional needs met with diet/EN/PN during admission    2. Maintain dry weight during admission    Nutrition Goal Status: progressing towards goal  Communication of RD Recs: other (comment) (POC)    Nutrition Discharge Planning    Nutrition Discharge Planning: Other (comments)  Other (comments): RD will provide education for high protein, high calorie, food safety    Assessment and Plan    Nutrition Problem  Inadequate energy intake    Related to (etiology):   Decreased appetite    Signs and Symptoms (as evidenced by):   Less than 75% EEN/EPN met with diet     Interventions/Recommendations (treatment strategy):  Boost     Nutrition Diagnosis Status:   New    Nutrition Problem  Inadequate oral intake      Related to (etiology):   Difficulty swallowing secondary to phlegm      Signs and Symptoms (as evidenced by):   Intake <70% of EEN      Interventions/Recommendations (treatment strategy):  Collaboration of nutritional care with other providers         Nutrition Diagnosis Status:   Resolving    Malnutrition Assessment                                       Reason for Assessment    Reason For Assessment: RD follow-up  Diagnosis:  (Stem cell transplant candidate)  General Information Comments: Patient assessed today for follow up.  Patient remains admitted for Stem cell transplant.  Patient currently on Regular diet with poor po intake (25-50% x 2 days). 5 lb weight loss x 3 days (2.3%- not yet significant).  Per last RD note, patient reports 50% intake and does enjoy Chocolate Boost.  Will order  "daily. +BM 4/13- diarrhea.    Nutrition/Diet History    Spiritual, Cultural Beliefs, Baptist Practices, Values that Affect Care: no  Food Allergies: NKFA  Factors Affecting Nutritional Intake: decreased appetite, diarrhea    Anthropometrics    Height: 5' 9" (175.3 cm)  Height (inches): 69 in  Height Method: Stated  Weight: 97.2 kg (214 lb 4.6 oz)  Weight (lb): 214.29 lb  Weight Method: Standard Scale  Ideal Body Weight (IBW), Male: 160 lb  % Ideal Body Weight, Male (lb): 138.41 %  BMI (Calculated): 31.6  BMI Grade: 30 - 34.9- obesity - grade I       Lab/Procedures/Meds    Pertinent Labs Reviewed: reviewed  Pertinent Labs Comments: Amlodipine, levothyroxine, pantoprazole, sodium bicarb  Pertinent Medications Reviewed: reviewed    Estimated/Assessed Needs    Weight Used For Calorie Calculations: 97.2 kg (214 lb 4.6 oz)  Energy Calorie Requirements (kcal): 1362-6966 kcal  Energy Need Method: Carbon-St Jeor (miff + miff x 1.25 (for increased energy needs))  Protein Requirements:  g ((1.0-1.5 g/kg))  Weight Used For Protein Calculations: 97.2 kg (214 lb 4.6 oz)  Fluid Requirements (mL): per MD  Estimated Fluid Requirement Method: RDA Method  RDA Method (mL): 1742         Nutrition Prescription Ordered    Current Diet Order: Adult regular    Evaluation of Received Nutrient/Fluid Intake    I/O: .  Energy Calories Required: not meeting needs  Protein Required: not meeting needs  Fluid Required: not meeting needs  Comments: -  Tolerance: tolerating  % Intake of Estimated Energy Needs: 25 - 50 %  % Meal Intake: 25 - 50 %    Nutrition Risk    Level of Risk/Frequency of Follow-up: moderate     Monitor and Evaluation    Monitor and Evaluation: Nutrition focused physical findings, Lipid profile, Inflammatory profile, Glucose/endocrine profile, Gastrointestinal profile, Electrolyte and renal panel, Weight, Energy intake, Food and beverage intake     Nutrition Follow-Up    RD Follow-up?: Yes      "

## 2025-04-14 NOTE — PLAN OF CARE
Recommendations    --Recommend weekly weights    --Continue Regular diet as tolerated and clinically indicated.     --Will order Boost [facility] Chocolate daily to aid in calorie and protein intake  --Nursing: please continue to document % meal eaten on flowsheets    --RD to monitor weight, po intake    --Encourage good intakes    Goals:   1.  75% nutritional needs met with diet/EN/PN during admission    2. Maintain dry weight during admission    Nutrition Goal Status: progressing towards goal

## 2025-04-14 NOTE — PT/OT/SLP PROGRESS
Physical Therapy Treatment    Patient Name:  Mitchel Joya   MRN:  [id number]    Recommendations:     Discharge Recommendations: No Therapy Indicated  Discharge Equipment Recommendations: none  Barriers to discharge: None    Assessment:     Mitchel Joya is a 65 y.o. male admitted with a medical diagnosis of History of autologous stem cell transplant.  He presents with the following impairments/functional limitations:  (at risk for deconditioning) Pt tolerated treatment session well today. Pt requiring little to no assistance for bed mobility, transfers and gait training. Pt was able to increase distance ambulated during today's session. Patient remains appropriate for continued skilled services within the acute environment and goals remain appropriate.   .    Rehab Prognosis: Good; patient would benefit from acute skilled PT services to address these deficits and reach maximum level of function.    Recent Surgery: * No surgery found *      Plan:     During this hospitalization, patient to be seen 1 x/week to address the identified rehab impairments via therapeutic activities, gait training, therapeutic exercises and progress toward the following goals:    Plan of Care Expires:  05/07/25    Subjective     Chief Complaint: None stated  Patient/Family Comments/goals: pt agreeable to PT   Pain/Comfort:  Pain Rating 1: 0/10      Objective:     Communicated with Rn prior to session.  Patient found supine with  (no active lines) upon PT entry to room.     General Precautions: Standard, fall  Orthopedic Precautions: N/A  Braces: N/A  Respiratory Status: Room air     Functional Mobility:  Bed Mobility:     Supine to Sit: independence    Transfers:     Sit to Stand:  independence with no AD  Gait: 660 ft supervision with no AD     Pt performed 10 repetitions of seated B LE exercises consisting of: Marching, LAQ, ABD/ADD, heel raises, and toe raises.         AM-PAC 6 CLICK MOBILITY  Turning over in bed (including adjusting  bedclothes, sheets and blankets)?: 4  Sitting down on and standing up from a chair with arms (e.g., wheelchair, bedside commode, etc.): 4  Moving from lying on back to sitting on the side of the bed?: 4  Moving to and from a bed to a chair (including a wheelchair)?: 4  Need to walk in hospital room?: 4  Climbing 3-5 steps with a railing?: 4  Basic Mobility Total Score: 24       Treatment & Education:  Therapist provided instruction and educated for safety during transfers and gait training. As well as proper body mechanics, energy conservation, and fall prevention strategies during tasks listed above, and the effects of prolonged immobility and the importance of performing EOB/OOB activity and exercises to promote healing and reduce recovery time.       Patient left up in chair with all lines intact, call button in reach, and Rn notified..    GOALS:   Multidisciplinary Problems       Physical Therapy Goals          Problem: Physical Therapy    Goal Priority Disciplines Outcome Interventions   Physical Therapy Goal     PT, PT/OT Progressing    Description: Goals to be met by:      Patient will increase functional independence with mobility by performin. Gait  x 600 feet with Modified [place] using no AD.   2. Lower extremity exercise program x15 reps per handout, with independence                         DME Justifications:  No DME recommended requiring DME justifications    Time Tracking:     PT Received On: 25  PT Start Time: 59     PT Stop Time: 1011  PT Total Time (min): 12 min     Billable Minutes: Gait Training 12    Treatment Type: Treatment  PT/PTA: PTA     Number of PTA visits since last PT visit: 2025

## 2025-04-14 NOTE — SUBJECTIVE & OBJECTIVE
Subjective:     Interval History: Day +6 from a Jennifer 200 auto SCT for MM. Remains afebrile. VSS. Having infrequent c-diff neg diarrhea. PRN imodium available. Oral intake remains good. No oral mucositis. Will resume IVF with worsening oral fluid intake or increased diarrhea.    Objective:     Vital Signs (Most Recent):  Temp: 98.8 °F (37.1 °C) (04/14/25 0732)  Pulse: 87 (04/14/25 0732)  Resp: 18 (04/14/25 0732)  BP: 115/66 (04/14/25 0732)  SpO2: 98 % (04/14/25 0732) Vital Signs (24h Range):  Temp:  [98.1 °F (36.7 °C)-98.8 °F (37.1 °C)] 98.8 °F (37.1 °C)  Pulse:  [71-94] 87  Resp:  [18-22] 18  SpO2:  [95 %-99 %] 98 %  BP: (104-130)/(66-83) 115/66     Weight: 97.2 kg (214 lb 4.6 oz)  Body mass index is 31.64 kg/m².  Body surface area is 2.18 meters squared.    ECOG SCORE           [unfilled]    Intake/Output - Last 3 Shifts         04/12 0700  04/13 0659 04/13 0700 04/14 0659 04/14 0700  04/15 0659    P.O. 350 350     Total Intake(mL/kg) 350 (3.6) 350 (3.6)     Urine (mL/kg/hr) 1600 (0.7) 250 (0.1)     Emesis/NG output 0      Other 0      Stool 0 0     Total Output 1600 250     Net -1250 +100            Urine Occurrence 2 x 2 x     Stool Occurrence 5 x 3 x     Emesis Occurrence 0 x 0 x              Physical Exam  Constitutional:       Appearance: He is well-developed.   HENT:      Head: Normocephalic and atraumatic.      Mouth/Throat:      Pharynx: No oropharyngeal exudate.   Eyes:      General:         Right eye: No discharge.         Left eye: No discharge.      Conjunctiva/sclera: Conjunctivae normal.      Pupils: Pupils are equal, round, and reactive to light.   Cardiovascular:      Rate and Rhythm: Normal rate and regular rhythm.      Heart sounds: Normal heart sounds. No murmur heard.  Pulmonary:      Effort: Pulmonary effort is normal. No respiratory distress.      Breath sounds: Normal breath sounds. No wheezing or rales.   Abdominal:      General: Bowel sounds are normal. There is no distension.       The requested forms have been signed by Dr. Vo and faxed to [facility] today.   Palpations: Abdomen is soft.      Tenderness: There is no abdominal tenderness.   Musculoskeletal:         General: No deformity. Normal range of motion.      Cervical back: Normal range of motion and neck supple.   Skin:     General: Skin is warm and dry.      Findings: No erythema or rash.      Comments: Right chest wall vas cath. Dressing c/d/i. No sign of infection to site.   Neurological:      Mental Status: He is alert and oriented to person, place, and time.   Psychiatric:         Behavior: Behavior normal.         Thought Content: Thought content normal.         Judgment: Judgment normal.            Significant Labs:   CBC:   Recent Labs   Lab 04/13/25 0413 04/14/25 0417   WBC 0.93* 0.46*   HGB 10.7* 10.7*   HCT 33.2* 33.1*   * 81*    and CMP:   Recent Labs   Lab 04/13/25 0413 04/14/25 0417    140   K 3.8 3.8    107   CO2 28 27   BUN 11 11   CREATININE 0.7 0.7   CALCIUM 8.9 8.9   ALBUMIN 2.9* 2.9*   BILITOT 0.4 0.3   ALKPHOS 75 73   AST 17 15   ALT 23 21   ANIONGAP 6* 6*       Diagnostic Results:  None

## 2025-04-14 NOTE — PROGRESS NOTES
[facility] ([facility])  Hematology  Bone Marrow Transplant  Progress Note    Patient Name: Mitchel Joya  Admission Date: 4/6/2025  Hospital Length of Stay: 8 days  Code Status: Full Code    Subjective:     Interval History: Day +6 from a Jennifer 200 auto SCT for MM. Remains afebrile. VSS. Having infrequent c-diff neg diarrhea. PRN imodium available. Oral intake remains good. No oral mucositis. Will resume IVF with worsening oral fluid intake or increased diarrhea.    Objective:     Vital Signs (Most Recent):  Temp: 98.8 °F (37.1 °C) (04/14/25 0732)  Pulse: 87 (04/14/25 0732)  Resp: 18 (04/14/25 0732)  BP: 115/66 (04/14/25 0732)  SpO2: 98 % (04/14/25 0732) Vital Signs (24h Range):  Temp:  [98.1 °F (36.7 °C)-98.8 °F (37.1 °C)] 98.8 °F (37.1 °C)  Pulse:  [71-94] 87  Resp:  [18-22] 18  SpO2:  [95 %-99 %] 98 %  BP: (104-130)/(66-83) 115/66     Weight: 97.2 kg (214 lb 4.6 oz)  Body mass index is 31.64 kg/m².  Body surface area is 2.18 meters squared.    ECOG SCORE           [unfilled]    Intake/Output - Last 3 Shifts         04/12 0700 04/13 0659 04/13 0700 04/14 0659 04/14 0700  04/15 0659    P.O. 350 350     Total Intake(mL/kg) 350 (3.6) 350 (3.6)     Urine (mL/kg/hr) 1600 (0.7) 250 (0.1)     Emesis/NG output 0      Other 0      Stool 0 0     Total Output 1600 250     Net -1250 +100            Urine Occurrence 2 x 2 x     Stool Occurrence 5 x 3 x     Emesis Occurrence 0 x 0 x              Physical Exam  Constitutional:       Appearance: He is well-developed.   HENT:      Head: Normocephalic and atraumatic.      Mouth/Throat:      Pharynx: No oropharyngeal exudate.   Eyes:      General:         Right eye: No discharge.         Left eye: No discharge.      Conjunctiva/sclera: Conjunctivae normal.      Pupils: Pupils are equal, round, and reactive to light.   Cardiovascular:      Rate and Rhythm: Normal rate and regular rhythm.      Heart sounds: Normal heart sounds. No murmur heard.  Pulmonary:      Effort: Pulmonary  effort is normal. No respiratory distress.      Breath sounds: Normal breath sounds. No wheezing or rales.   Abdominal:      General: Bowel sounds are normal. There is no distension.      Palpations: Abdomen is soft.      Tenderness: There is no abdominal tenderness.   Musculoskeletal:         General: No deformity. Normal range of motion.      Cervical back: Normal range of motion and neck supple.   Skin:     General: Skin is warm and dry.      Findings: No erythema or rash.      Comments: Right chest wall vas cath. Dressing c/d/i. No sign of infection to site.   Neurological:      Mental Status: He is alert and oriented to person, place, and time.   Psychiatric:         Behavior: Behavior normal.         Thought Content: Thought content normal.         Judgment: Judgment normal.            Significant Labs:   CBC:   Recent Labs   Lab 04/13/25 0413 04/14/25 0417   WBC 0.93* 0.46*   HGB 10.7* 10.7*   HCT 33.2* 33.1*   * 81*    and CMP:   Recent Labs   Lab 04/13/25 0413 04/14/25 0417    140   K 3.8 3.8    107   CO2 28 27   BUN 11 11   CREATININE 0.7 0.7   CALCIUM 8.9 8.9   ALBUMIN 2.9* 2.9*   BILITOT 0.4 0.3   ALKPHOS 75 73   AST 17 15   ALT 23 21   ANIONGAP 6* 6*       Diagnostic Results:  None  Assessment/Plan:     * History of autologous stem cell transplant  Patient of Dr. Hemphill  - Coordinator: Xochilt Diaz  - Conditioning Regimen: Melphalan 200 mg/m2  - Cell Dose: 5-10 x 10^6 CD34/kg  - Maintenance: Korina+Rev  - Caregiver: Partner   - [facility]: [facility]   - Admitted 4/6/25 for Jennifer 200 Auto. Today is Day +6.  - Stem cell infusion completed  4/08/25 at 11:30. Received 5 bags with a total CD34 dose of 4.39 x10^6/kg, tolerated without issue    Planned conditioning regimen:  Melphalan 200 on Day -1     Antimicrobial Prophylaxis:  Acyclovir starting on Day -1  Levofloxacin starting on Day -1  Fluconazole starting on Day -1  Bactrim starting on Day +30     Growth Factor Support:  Neupogen  starting on Day +7     Multiple myeloma not having achieved remission  Treatment history:  10/29/24: C1 Korina Vrd (notes state velcade has been held due to neuropathy and lenalidomide has been held due to cytopenias), remained on this until pre-transplant restaging  11/15/24: Palliative radiation to right hip    Restaging:  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  SPEP with 0.16 g/dL and 0.12 g/dL. FLCs normal.   PET/CT (3/7/25) - no new hypermetabolic lesions.    See Autologous Stem cell transplant     Pancytopenia due to chemotherapy  - Anticipated following chemotherapy.  - Transfuse for hgb < 7 or plts < 10K.  - Daily CBC while IP.  - Continuing antimicrobial ppx.    Chemotherapy-induced diarrhea  - C-diff neg  - Started Imodium QID, PRN    Elevated BP without diagnosis of hypertension  - BP consistently high normal, not on antihypertensives at home.   - Monitoring closely, low threshold to start antihypertensives.  - PRN hydralazine available.  - Stopped IVF 4/9. Will resume with reduced oral intake or with fluid losses through diarrhea or emesis.  - Improved off IVF.    Thrombocytopenia  - Anticipate pancytopenia following chemotherapy.  - Transfuse for plts < 10K.  - Daily CBC while IP.    Immunocompromised state associated with stem cell transplant  - See Multiple myeloma not having achieved remission   - See Autologous stem cell transplant     Neoplastic (malignant) related fatigue  - PT/OT consulted per BMT protocol    At risk for malnutrition  - RD/Nutrition consulted per BMT protocol    Immunosuppressed due to chemotherapy  - Will start ppx levaquin and diflucan   - On ppx Valtrex at home, no reported history of shingles. Transitioned to ppx acyclovir.     Hypercoagulable state  - On daily ASA at home, will hold while inpatient  - Ppx lovenox until platelets <50k    Hypothyroid  - Continue home  synthroid    Hyperlipidemia  - Holding home crestor while inpatient     Peripheral neuropathy  - Continue home Lyrica    Obesity (BMI 30.0-34.9)  - Body mass index is 31.64 kg/m². Morbid obesity complicates all aspects of disease management from diagnostic modalities to treatment. Weight loss encouraged and health benefits explained to patient.          VTE Risk Mitigation (From admission, onward)           Ordered     enoxaparin injection 40 mg  Every 24 hours         04/07/25 1333     heparin, porcine (PF) 100 unit/mL injection flush 300 Units  As needed (PRN)         04/06/25 2212     heparin (porcine) injection 1,600 Units  As needed (PRN)         04/06/25 2203     IP VTE HIGH RISK PATIENT  Once         04/06/25 1824     Place sequential compression device  Until discontinued         04/06/25 1824                    Disposition: Inpatient for autologous SCT. Awaiting neutrophil engraftment.    Gina Harding, NP  Bone Marrow Transplant  [facility] ([facility])

## 2025-04-14 NOTE — ASSESSMENT & PLAN NOTE
- Anticipated following chemotherapy.  - Transfuse for hgb < 7 or plts < 10K.  - Daily CBC while IP.  - Continuing antimicrobial ppx.

## 2025-04-14 NOTE — PT/OT/SLP PROGRESS
Occupational Therapy      Patient Name:  Mitchel Joya   MRN:  [id number]    Patient not seen today secondary to pt. Reported just returned to bed as he felt nauseated. Trudy left in pt. Room for next visit with OT for instruction on there ex. Therapist unable to return on this date.   4/14/2025

## 2025-04-14 NOTE — PLAN OF CARE
Day +6 Jennifer Auto SCT for MM. Patient AAOX4, VSS, afebrile, on room air. Patient had no concerns over night. Melatonin given prn x1 for sleep. Patient up to the bathroom independently, free from falls and injuries. I&O's monitored as ordered. Cdiff negative. Bed in lowest position, side rails up x2, non-skid socks on, call light in reach. Bed alarm refused, patient educated on importance and verbalized understanding. Patient educated to use call light for any needs, patient verbalizes understanding. There are no concerns at this time. Plan of care on going.    Potassium 3.8, electrolytes scheduled and replaced.

## 2025-04-15 PROBLEM — R11.0 CHEMOTHERAPY-INDUCED NAUSEA: Status: ACTIVE | Noted: 2025-04-15

## 2025-04-15 PROBLEM — T45.1X5A CHEMOTHERAPY-INDUCED NAUSEA: Status: ACTIVE | Noted: 2025-04-15

## 2025-04-15 LAB
ABSOLUTE NEUTROPHIL MANUAL (OHS): 0 K/UL
ALBUMIN SERPL BCP-MCNC: 2.9 G/DL (ref 3.5–5.2)
ALP SERPL-CCNC: 74 UNIT/L (ref 40–150)
ALT SERPL W/O P-5'-P-CCNC: 20 UNIT/L (ref 10–44)
ANION GAP (OHS): 8 MMOL/L (ref 8–16)
ANISOCYTOSIS BLD QL SMEAR: SLIGHT
AST SERPL-CCNC: 15 UNIT/L (ref 11–45)
BILIRUB SERPL-MCNC: 0.3 MG/DL (ref 0.1–1)
BUN SERPL-MCNC: 8 MG/DL (ref 8–23)
CALCIUM SERPL-MCNC: 8.8 MG/DL (ref 8.7–10.5)
CHLORIDE SERPL-SCNC: 107 MMOL/L (ref 95–110)
CO2 SERPL-SCNC: 25 MMOL/L (ref 23–29)
CREAT SERPL-MCNC: 0.6 MG/DL (ref 0.5–1.4)
EOSINOPHIL NFR BLD MANUAL: 13.3 % (ref 0–8)
ERYTHROCYTE [DISTWIDTH] IN BLOOD BY AUTOMATED COUNT: 17.3 % (ref 11.5–14.5)
GFR SERPLBLD CREATININE-BSD FMLA CKD-EPI: >60 ML/MIN/1.73/M2
GLUCOSE SERPL-MCNC: 103 MG/DL (ref 70–110)
HCT VFR BLD AUTO: 32.7 % (ref 40–54)
HGB BLD-MCNC: 10.5 GM/DL (ref 14–18)
LYMPHOCYTES NFR BLD MANUAL: 66.7 % (ref 18–48)
MAGNESIUM SERPL-MCNC: 2 MG/DL (ref 1.6–2.6)
MCH RBC QN AUTO: 29.3 PG (ref 27–31)
MCHC RBC AUTO-ENTMCNC: 32.1 G/DL (ref 32–36)
MCV RBC AUTO: 91 FL (ref 82–98)
NEUTROPHILS NFR BLD MANUAL: 20 % (ref 38–73)
NUCLEATED RBC (/100WBC) (OHS): 0 /100 WBC
OVALOCYTES BLD QL SMEAR: ABNORMAL
PHOSPHATE SERPL-MCNC: 3.1 MG/DL (ref 2.7–4.5)
PLATELET # BLD AUTO: 66 K/UL (ref 150–450)
PLATELET BLD QL SMEAR: ABNORMAL
PMV BLD AUTO: 10.7 FL (ref 9.2–12.9)
POIKILOCYTOSIS BLD QL SMEAR: SLIGHT
POTASSIUM SERPL-SCNC: 3.7 MMOL/L (ref 3.5–5.1)
PROT SERPL-MCNC: 5.4 GM/DL (ref 6–8.4)
RBC # BLD AUTO: 3.58 M/UL (ref 4.6–6.2)
SODIUM SERPL-SCNC: 140 MMOL/L (ref 136–145)
WBC # BLD AUTO: 0.1 K/UL (ref 3.9–12.7)

## 2025-04-15 PROCEDURE — 97110 THERAPEUTIC EXERCISES: CPT

## 2025-04-15 PROCEDURE — 94761 N-INVAS EAR/PLS OXIMETRY MLT: CPT

## 2025-04-15 PROCEDURE — 25000003 PHARM REV CODE 250: Performed by: INTERNAL MEDICINE

## 2025-04-15 PROCEDURE — 85027 COMPLETE CBC AUTOMATED: CPT | Performed by: INTERNAL MEDICINE

## 2025-04-15 PROCEDURE — 84100 ASSAY OF PHOSPHORUS: CPT | Performed by: NURSE PRACTITIONER

## 2025-04-15 PROCEDURE — 63600175 PHARM REV CODE 636 W HCPCS: Mod: JZ,TB | Performed by: INTERNAL MEDICINE

## 2025-04-15 PROCEDURE — 20600001 HC STEP DOWN PRIVATE ROOM

## 2025-04-15 PROCEDURE — 25000003 PHARM REV CODE 250

## 2025-04-15 PROCEDURE — 25000003 PHARM REV CODE 250: Performed by: NURSE PRACTITIONER

## 2025-04-15 PROCEDURE — 63600175 PHARM REV CODE 636 W HCPCS: Performed by: NURSE PRACTITIONER

## 2025-04-15 PROCEDURE — 83735 ASSAY OF MAGNESIUM: CPT | Performed by: NURSE PRACTITIONER

## 2025-04-15 PROCEDURE — 80053 COMPREHEN METABOLIC PANEL: CPT | Performed by: NURSE PRACTITIONER

## 2025-04-15 PROCEDURE — 99233 SBSQ HOSP IP/OBS HIGH 50: CPT | Mod: ,,, | Performed by: INTERNAL MEDICINE

## 2025-04-15 RX ORDER — LOPERAMIDE HYDROCHLORIDE 2 MG/1
2 CAPSULE ORAL 4 TIMES DAILY
Status: DISCONTINUED | OUTPATIENT
Start: 2025-04-15 | End: 2025-04-21

## 2025-04-15 RX ORDER — DIPHENOXYLATE HYDROCHLORIDE AND ATROPINE SULFATE 2.5; .025 MG/1; MG/1
1 TABLET ORAL 4 TIMES DAILY PRN
Status: DISCONTINUED | OUTPATIENT
Start: 2025-04-15 | End: 2025-04-17

## 2025-04-15 RX ORDER — ONDANSETRON HYDROCHLORIDE 2 MG/ML
8 INJECTION, SOLUTION INTRAVENOUS EVERY 8 HOURS
Status: DISCONTINUED | OUTPATIENT
Start: 2025-04-15 | End: 2025-04-21

## 2025-04-15 RX ORDER — DICYCLOMINE HYDROCHLORIDE 10 MG/1
10 CAPSULE ORAL 4 TIMES DAILY
Status: DISCONTINUED | OUTPATIENT
Start: 2025-04-15 | End: 2025-04-21

## 2025-04-15 RX ADMIN — ACYCLOVIR 800 MG: 200 CAPSULE ORAL at 09:04

## 2025-04-15 RX ADMIN — DICYCLOMINE HYDROCHLORIDE 10 MG: 10 CAPSULE ORAL at 09:04

## 2025-04-15 RX ADMIN — DULOXETINE HYDROCHLORIDE 20 MG: 20 CAPSULE, DELAYED RELEASE ORAL at 08:04

## 2025-04-15 RX ADMIN — ONDANSETRON 8 MG: 2 INJECTION INTRAMUSCULAR; INTRAVENOUS at 02:04

## 2025-04-15 RX ADMIN — LOPERAMIDE HYDROCHLORIDE 2 MG: 2 CAPSULE ORAL at 08:04

## 2025-04-15 RX ADMIN — ACYCLOVIR 800 MG: 200 CAPSULE ORAL at 08:04

## 2025-04-15 RX ADMIN — Medication 6 MG: at 12:04

## 2025-04-15 RX ADMIN — PREGABALIN 75 MG: 75 CAPSULE ORAL at 09:04

## 2025-04-15 RX ADMIN — DICYCLOMINE HYDROCHLORIDE 10 MG: 10 CAPSULE ORAL at 12:04

## 2025-04-15 RX ADMIN — Medication 6 MG: at 09:04

## 2025-04-15 RX ADMIN — FLUCONAZOLE 400 MG: 200 TABLET ORAL at 08:04

## 2025-04-15 RX ADMIN — LEVOFLOXACIN 500 MG: 500 TABLET, FILM COATED ORAL at 08:04

## 2025-04-15 RX ADMIN — LOPERAMIDE HYDROCHLORIDE 2 MG: 2 CAPSULE ORAL at 12:04

## 2025-04-15 RX ADMIN — FILGRASTIM 480 MCG: 480 INJECTION, SOLUTION INTRAVENOUS; SUBCUTANEOUS at 08:04

## 2025-04-15 RX ADMIN — Medication 1 DOSE: at 05:04

## 2025-04-15 RX ADMIN — LOPERAMIDE HYDROCHLORIDE 2 MG: 2 CAPSULE ORAL at 05:04

## 2025-04-15 RX ADMIN — Medication 1 DOSE: at 09:04

## 2025-04-15 RX ADMIN — Medication 1 DOSE: at 12:04

## 2025-04-15 RX ADMIN — ENOXAPARIN SODIUM 40 MG: 40 INJECTION SUBCUTANEOUS at 05:04

## 2025-04-15 RX ADMIN — LEVOTHYROXINE SODIUM 88 MCG: 88 TABLET ORAL at 05:04

## 2025-04-15 RX ADMIN — ONDANSETRON 8 MG: 2 INJECTION INTRAMUSCULAR; INTRAVENOUS at 09:04

## 2025-04-15 RX ADMIN — PANTOPRAZOLE SODIUM 40 MG: 40 TABLET, DELAYED RELEASE ORAL at 08:04

## 2025-04-15 RX ADMIN — POTASSIUM CHLORIDE 20 MEQ: 1500 TABLET, EXTENDED RELEASE ORAL at 05:04

## 2025-04-15 RX ADMIN — LOPERAMIDE HYDROCHLORIDE 2 MG: 2 CAPSULE ORAL at 01:04

## 2025-04-15 RX ADMIN — HEPARIN SODIUM 1600 UNITS: 1000 INJECTION INTRAVENOUS; SUBCUTANEOUS at 09:04

## 2025-04-15 RX ADMIN — HEPARIN SODIUM 1600 UNITS: 1000 INJECTION INTRAVENOUS; SUBCUTANEOUS at 02:04

## 2025-04-15 RX ADMIN — DICYCLOMINE HYDROCHLORIDE 10 MG: 10 CAPSULE ORAL at 05:04

## 2025-04-15 RX ADMIN — Medication 1 DOSE: at 08:04

## 2025-04-15 RX ADMIN — LOPERAMIDE HYDROCHLORIDE 2 MG: 2 CAPSULE ORAL at 09:04

## 2025-04-15 RX ADMIN — ALUMINUM HYDROXIDE, MAGNESIUM HYDROXIDE, AND SIMETHICONE 30 ML: 200; 200; 20 SUSPENSION ORAL at 10:04

## 2025-04-15 RX ADMIN — AMLODIPINE BESYLATE 5 MG: 5 TABLET ORAL at 08:04

## 2025-04-15 NOTE — ASSESSMENT & PLAN NOTE
- Anticipate pancytopenia following chemotherapy.  - Transfuse for plts < 10K.  - hold anticoagulation when platelets <50k  - Daily CBC while IP.

## 2025-04-15 NOTE — PLAN OF CARE
Day +7 Jennifer Auto SCT for MM. Patient AAOX4, VSS, afebrile, on room air. Melatonin given prn x1 for sleep and imodium x1. Patient up to the bathroom independently, free from falls and injuries. I&O's monitored as ordered. Bed in lowest position, side rails up x2, non-skid socks on, call light in reach. Bed alarm refused, patient educated on importance and verbalized understanding. Patient educated to use call light for any needs, patient verbalizes understanding. There are no concerns at this time. Plan of care on going.    Potassium 3.7, electrolytes scheduled and replaced.

## 2025-04-15 NOTE — ASSESSMENT & PLAN NOTE
- has PRN Zofran and Compazine  - worsening nausea with episode of vomiting this am  - will change Zofran to ATC

## 2025-04-15 NOTE — ASSESSMENT & PLAN NOTE
Patient of Dr. Hemphill  - Coordinator: Xochilt Diaz  - Conditioning Regimen: Melphalan 200 mg/m2  - Cell Dose: 5-10 x 10^6 CD34/kg  - Maintenance: Korina+Rev  - Caregiver: Partner   - [facility]: [facility]   - Admitted 4/6/25 for Jennifer 200 Auto. Today is Day +7.  - Stem cell infusion completed  4/08/25 at 11:30. Received 5 bags with a total CD34 dose of 4.39 x10^6/kg, tolerated without issue    Planned conditioning regimen:  Melphalan 200 on Day -1     Antimicrobial Prophylaxis:  Acyclovir starting on Day -1  Levofloxacin starting on Day -1  Fluconazole starting on Day -1  Bactrim starting on Day +30     Growth Factor Support:  Neupogen starting on Day +7

## 2025-04-15 NOTE — PROGRESS NOTES
[facility] ([facility])  Hematology  Bone Marrow Transplant  Progress Note    Patient Name: Mitchel Joya  Admission Date: 4/6/2025  Hospital Length of Stay: 9 days  Code Status: Full Code    Subjective:     Interval History: Day +7 from a Jennifer 200 auto SCT for MM. Multiple GI complaints. Reports abdominal cramping, Bentyl started. Vomited this am, changed Zofran to ATC. Continues with diarrhea, BMx5. Will schedule Imodium and added lomotil PRN. VSS, BP improved. Stopping Amlodipine.     Objective:     Vital Signs (Most Recent):  Temp: 98.6 °F (37 °C) (04/15/25 1124)  Pulse: 98 (04/15/25 1124)  Resp: 17 (04/15/25 1124)  BP: 97/66 (04/15/25 1124)  SpO2: 97 % (04/15/25 1124) Vital Signs (24h Range):  Temp:  [98 °F (36.7 °C)-98.6 °F (37 °C)] 98.6 °F (37 °C)  Pulse:  [79-98] 98  Resp:  [17-20] 17  SpO2:  [96 %-99 %] 97 %  BP: ()/(60-85) 97/66     Weight: 97.2 kg (214 lb 4.6 oz)  Body mass index is 31.64 kg/m².  Body surface area is 2.18 meters squared.      Intake/Output - Last 3 Shifts         04/13 0700  04/14 0659 04/14 0700  04/15 0659 04/15 0700  04/16 0659    P.O. 350 550 400    Total Intake(mL/kg) 350 (3.6) 550 (5.7) 400 (4.1)    Urine (mL/kg/hr) 250 (0.1) 500 (0.2) 300 (0.5)    Emesis/NG output  0 0    Other  0     Stool 0 0 0    Total Output 250 500 300    Net +100 +50 +100           Urine Occurrence 2 x 4 x 2 x    Stool Occurrence 3 x 5 x 3 x    Emesis Occurrence 0 x 0 x 1 x             Physical Exam  Constitutional:       Appearance: He is well-developed.   HENT:      Head: Normocephalic and atraumatic.      Mouth/Throat:      Pharynx: No oropharyngeal exudate.   Eyes:      General:         Right eye: No discharge.         Left eye: No discharge.      Conjunctiva/sclera: Conjunctivae normal.      Pupils: Pupils are equal, round, and reactive to light.   Cardiovascular:      Rate and Rhythm: Normal rate and regular rhythm.      Heart sounds: Normal heart sounds. No murmur heard.  Pulmonary:       Effort: Pulmonary effort is normal. No respiratory distress.      Breath sounds: Normal breath sounds. No wheezing or rales.   Abdominal:      General: Bowel sounds are normal. There is no distension.      Palpations: Abdomen is soft.      Tenderness: There is no abdominal tenderness.   Musculoskeletal:         General: No deformity. Normal range of motion.      Cervical back: Normal range of motion and neck supple.   Skin:     General: Skin is warm and dry.      Findings: No erythema or rash.      Comments: Right chest wall vas cath. Dressing c/d/i. No sign of infection to site.   Neurological:      Mental Status: He is alert and oriented to person, place, and time.   Psychiatric:         Behavior: Behavior normal.         Thought Content: Thought content normal.         Judgment: Judgment normal.            Significant Labs:   CBC:   Recent Labs   Lab 04/14/25  0417 04/15/25  0413   WBC 0.46* 0.10*   HGB 10.7* 10.5*   HCT 33.1* 32.7*   PLT 81* 66*    and CMP:   Recent Labs   Lab 04/14/25  0417 04/15/25  0413    140   K 3.8 3.7    107   CO2 27 25   BUN 11 8   CREATININE 0.7 0.6   CALCIUM 8.9 8.8   ALBUMIN 2.9* 2.9*   BILITOT 0.3 0.3   ALKPHOS 73 74   AST 15 15   ALT 21 20   ANIONGAP 6* 8       Diagnostic Results:  None  Assessment/Plan:     * History of autologous stem cell transplant  Patient of Dr. Hemphill  - Coordinator: Xochilt Diaz  - Conditioning Regimen: Melphalan 200 mg/m2  - Cell Dose: 5-10 x 10^6 CD34/kg  - Maintenance: Korina+Rev  - Caregiver: Partner   - [facility]   - Admitted 4/6/25 for Jennifer 200 Auto. Today is Day +7.  - Stem cell infusion completed  4/08/25 at 11:30. Received 5 bags with a total CD34 dose of 4.39 x10^6/kg, tolerated without issue    Planned conditioning regimen:  Melphalan 200 on Day -1     Antimicrobial Prophylaxis:  Acyclovir starting on Day -1  Levofloxacin starting on Day -1  Fluconazole starting on Day -1  Bactrim starting on Day +30     Growth Factor  Support:  Neupogen starting on Day +7     Multiple myeloma not having achieved remission  Treatment history:  10/29/24: C1 Korina Vrd (notes state velcade has been held due to neuropathy and lenalidomide has been held due to cytopenias), remained on this until pre-transplant restaging  11/15/24: Palliative radiation to right hip    Restaging:  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  SPEP with 0.16 g/dL and 0.12 g/dL. FLCs normal.   PET/CT (3/7/25) - no new hypermetabolic lesions.    See Autologous Stem cell transplant     Immunosuppressed due to chemotherapy  - Will start ppx levaquin and diflucan   - On ppx Valtrex at home, no reported history of shingles. Transitioned to ppx acyclovir.     Hypercoagulable state  - On daily ASA at home, will hold while inpatient  - Ppx lovenox until platelets <50k    Immunocompromised state associated with stem cell transplant  - See Multiple myeloma not having achieved remission   - See Autologous stem cell transplant     Pancytopenia due to chemotherapy  - Anticipated following chemotherapy.  - Transfuse for hgb < 7 or plts < 10K.  - Daily CBC while IP.  - Continuing antimicrobial ppx.    Elevated BP without diagnosis of hypertension  - BP consistently high normal, not on antihypertensives at home.   - Monitoring closely  - amlodipine started on 4/10  - PRN hydralazine available.  - Stopped IVF 4/9. Will resume with reduced oral intake or with fluid losses through diarrhea or emesis.  - Improved off IVF.  - BP has normalized, will stop amlodipine     Thrombocytopenia  - Anticipate pancytopenia following chemotherapy.  - Transfuse for plts < 10K.  - hold anticoagulation when platelets <50k  - Daily CBC while IP.    Chemotherapy-induced nausea  - has PRN Zofran and Compazine  - worsening nausea with episode of vomiting this am  - will change Zofran to ATC    Hypothyroid  - Continue home  synthroid    Chemotherapy-induced diarrhea  - C-diff neg  - Started Imodium QID, PRN  - will change Imodium to ATC and PRN Lomotil added     Hyperlipidemia  - Holding home crestor while inpatient     At risk for malnutrition  - RD/Nutrition consulted per BMT protocol    Neoplastic (malignant) related fatigue  - PT/OT consulted per BMT protocol    Peripheral neuropathy  - Continue home Lyrica    Obesity (BMI 30.0-34.9)  - Body mass index is 31.64 kg/m². Morbid obesity complicates all aspects of disease management from diagnostic modalities to treatment. Weight loss encouraged and health benefits explained to patient.          VTE Risk Mitigation (From admission, onward)           Ordered     enoxaparin injection 40 mg  Every 24 hours         04/07/25 1333     heparin, porcine (PF) 100 unit/mL injection flush 300 Units  As needed (PRN)         04/06/25 2212     heparin (porcine) injection 1,600 Units  As needed (PRN)         04/06/25 2203     IP VTE HIGH RISK PATIENT  Once         04/06/25 1824     Place sequential compression device  Until discontinued         04/06/25 1824                    Disposition: inpatient    Dulce Maria Saul NP  Bone Marrow Transplant  [facility] (Hospital)

## 2025-04-15 NOTE — PT/OT/SLP PROGRESS
Occupational Therapy   Treatment    Name: Mitchel Joya  MRN: [id number]  Admitting Diagnosis:  History of autologous stem cell transplant       Recommendations:     Discharge Recommendations: No Therapy Indicated  Discharge Equipment Recommendations:  none  Barriers to discharge:  None    Assessment:     Mitchel Joya is a 65 y.o. male with a medical diagnosis of History of autologous stem cell transplant.  He presents with good participation and motivation. Performance deficits affecting function are  (potential for decline in independence with ADL's).     Rehab Prognosis:  Good; patient would benefit from acute skilled OT services to address these deficits and reach maximum level of function.       Plan:     Patient to be seen 1 x/week to address the above listed problems via self-care/home management, therapeutic activities, therapeutic exercises  Plan of Care Expires: 05/15/25  Plan of Care Reviewed with: patient    Subjective     Chief Complaint: diarrhea & vomiting this morning  Patient/Family Comments/goals: Pt reported that he did not feel like he would be up to walking around but was willing to do theraband exercises.  Pain/Comfort:  Pain Rating 1: 0/10  Pain Rating Post-Intervention 1: 0/10    Objective:     Communicated with: RN prior to session.  Patient found up in chair with central line (no family present) upon OT entry to room.    General Precautions: Standard, fall    Orthopedic Precautions:N/A  Braces: N/A  Respiratory Status: Room air     Occupational Performance:       [facility] 6 Click ADL: 24    Treatment & Education:  Pt reported frequent diarrhea & vomiting this morning. Pt was agreeable to theraband exercises.  Provided pt with HEP for theraband exercises.  Provided verbal & demonstrated instruction on theraband exercises. Pt performed theraband exercises with BUE in 6 planes x 10 reps each while seated in chair. Provided education on no theraband exercises when platelets are below 10.  Provided  education on safety during mobility & with theraband exercises. Provided education to have nursing (A) if dizzy or weaker than normal rather than getting up on his own.  Pt had no further questions & when asked whether there were any concerns pt reported none.      Patient left seated in chair with all lines intact, call button in reach, and RN notified    GOALS:   Multidisciplinary Problems       Occupational Therapy Goals          Problem: Occupational Therapy    Goal Priority Disciplines Outcome Interventions   Occupational Therapy Goal     OT, PT/OT Progressing    Description: Goals to be met by: 04-21-25     Patient will increase functional independence with ADLs by performing:    LE Dressing with [place].  Grooming while standing at sink with [place].  Toileting from toilet with [place] for hygiene and clothing management.   Step transfer with [place]  Toilet transfer to toilet with [place].  Pt. To be independent with HEP to maintain level of endurance                         DME Justifications:  No DME recommended requiring DME justifications    Time Tracking:     OT Date of Treatment: 04/15/25  OT Start Time: 1420  OT Stop Time: 1438  OT Total Time (min): 18 min    Billable Minutes:Therapeutic Exercise 18    OT/ALIVIA: OT     Number of ALIVIA visits since last OT visit: 0    4/15/2025

## 2025-04-15 NOTE — PLAN OF CARE
Problem: Occupational Therapy  Goal: Occupational Therapy Goal  Description: Goals to be met by: 04-21-25     Patient will increase functional independence with ADLs by performing:    LE Dressing with [place].  Grooming while standing at sink with [place].  Toileting from toilet with [place] for hygiene and clothing management.   Step transfer with [place]  Toilet transfer to toilet with [place].  Pt. To be independent with HEP to maintain level of endurance    Outcome: Progressing     Goals remain appropriate

## 2025-04-15 NOTE — ASSESSMENT & PLAN NOTE
- BP consistently high normal, not on antihypertensives at home.   - Monitoring closely  - amlodipine started on 4/10  - PRN hydralazine available.  - Stopped IVF 4/9. Will resume with reduced oral intake or with fluid losses through diarrhea or emesis.  - Improved off IVF.  - BP has normalized, will stop amlodipine

## 2025-04-15 NOTE — PLAN OF CARE
Patient is AAOx4. Day +7 from a Jennifer 200 auto SCT for MM; no acute events this shift. Up, independent and family at bedside. Afebrile & VSS on room air. PRN maalox given x1. Ambulates independently to bathroom; educated on importance of I/O recording. CHG wipes provided and encouraged use. Tolerating diet with good intake and voiding without difficulty. Pt remaining free from falls or injury throughout shift; bed locked and in lowest position; call light within reach. POC reviewed with patient and family at bedside. Patient involved in care. All needs addressed. Frequent rounding performed. Patient is stable at this time.

## 2025-04-15 NOTE — SUBJECTIVE & OBJECTIVE
Subjective:     Interval History: Day +7 from a Jennifer 200 auto SCT for MM. Multiple GI complaints. Reports abdominal cramping, Bentyl started. Vomited this am, changed Zofran to ATC. Continues with diarrhea, BMx5. Will schedule Imodium and added lomotil PRN. VSS, BP improved. Stopping Amlodipine.     Objective:     Vital Signs (Most Recent):  Temp: 98.6 °F (37 °C) (04/15/25 1124)  Pulse: 98 (04/15/25 1124)  Resp: 17 (04/15/25 1124)  BP: 97/66 (04/15/25 1124)  SpO2: 97 % (04/15/25 1124) Vital Signs (24h Range):  Temp:  [98 °F (36.7 °C)-98.6 °F (37 °C)] 98.6 °F (37 °C)  Pulse:  [79-98] 98  Resp:  [17-20] 17  SpO2:  [96 %-99 %] 97 %  BP: ()/(60-85) 97/66     Weight: 97.2 kg (214 lb 4.6 oz)  Body mass index is 31.64 kg/m².  Body surface area is 2.18 meters squared.      Intake/Output - Last 3 Shifts         04/13 0700  04/14 0659 04/14 0700  04/15 0659 04/15 0700  04/16 0659    P.O. 350 550 400    Total Intake(mL/kg) 350 (3.6) 550 (5.7) 400 (4.1)    Urine (mL/kg/hr) 250 (0.1) 500 (0.2) 300 (0.5)    Emesis/NG output  0 0    Other  0     Stool 0 0 0    Total Output 250 500 300    Net +100 +50 +100           Urine Occurrence 2 x 4 x 2 x    Stool Occurrence 3 x 5 x 3 x    Emesis Occurrence 0 x 0 x 1 x             Physical Exam  Constitutional:       Appearance: He is well-developed.   HENT:      Head: Normocephalic and atraumatic.      Mouth/Throat:      Pharynx: No oropharyngeal exudate.   Eyes:      General:         Right eye: No discharge.         Left eye: No discharge.      Conjunctiva/sclera: Conjunctivae normal.      Pupils: Pupils are equal, round, and reactive to light.   Cardiovascular:      Rate and Rhythm: Normal rate and regular rhythm.      Heart sounds: Normal heart sounds. No murmur heard.  Pulmonary:      Effort: Pulmonary effort is normal. No respiratory distress.      Breath sounds: Normal breath sounds. No wheezing or rales.   Abdominal:      General: Bowel sounds are normal. There is no  distension.      Palpations: Abdomen is soft.      Tenderness: There is no abdominal tenderness.   Musculoskeletal:         General: No deformity. Normal range of motion.      Cervical back: Normal range of motion and neck supple.   Skin:     General: Skin is warm and dry.      Findings: No erythema or rash.      Comments: Right chest wall vas cath. Dressing c/d/i. No sign of infection to site.   Neurological:      Mental Status: He is alert and oriented to person, place, and time.   Psychiatric:         Behavior: Behavior normal.         Thought Content: Thought content normal.         Judgment: Judgment normal.            Significant Labs:   CBC:   Recent Labs   Lab 04/14/25  0417 04/15/25  0413   WBC 0.46* 0.10*   HGB 10.7* 10.5*   HCT 33.1* 32.7*   PLT 81* 66*    and CMP:   Recent Labs   Lab 04/14/25  0417 04/15/25  0413    140   K 3.8 3.7    107   CO2 27 25   BUN 11 8   CREATININE 0.7 0.6   CALCIUM 8.9 8.8   ALBUMIN 2.9* 2.9*   BILITOT 0.3 0.3   ALKPHOS 73 74   AST 15 15   ALT 21 20   ANIONGAP 6* 8       Diagnostic Results:  None

## 2025-04-16 PROBLEM — I95.9 HYPOTENSION: Status: ACTIVE | Noted: 2025-04-16

## 2025-04-16 PROBLEM — G47.00 INSOMNIA: Status: ACTIVE | Noted: 2025-04-16

## 2025-04-16 LAB
ABSOLUTE EOSINOPHIL (OHS): 0 K/UL
ABSOLUTE MONOCYTE (OHS): 0.02 K/UL (ref 0.3–1)
ABSOLUTE NEUTROPHIL COUNT (OHS): 0.01 K/UL (ref 1.8–7.7)
ALBUMIN SERPL BCP-MCNC: 2.9 G/DL (ref 3.5–5.2)
ALP SERPL-CCNC: 76 UNIT/L (ref 40–150)
ALT SERPL W/O P-5'-P-CCNC: 20 UNIT/L (ref 10–44)
ANION GAP (OHS): 9 MMOL/L (ref 8–16)
AST SERPL-CCNC: 12 UNIT/L (ref 11–45)
BASOPHILS # BLD AUTO: 0 K/UL
BASOPHILS NFR BLD AUTO: 0 %
BILIRUB SERPL-MCNC: 0.3 MG/DL (ref 0.1–1)
BLOOD GROUP ANTIBODIES SERPL: NORMAL
BUN SERPL-MCNC: 9 MG/DL (ref 8–23)
CALCIUM SERPL-MCNC: 9.1 MG/DL (ref 8.7–10.5)
CHLORIDE SERPL-SCNC: 107 MMOL/L (ref 95–110)
CO2 SERPL-SCNC: 24 MMOL/L (ref 23–29)
CREAT SERPL-MCNC: 0.6 MG/DL (ref 0.5–1.4)
DAT IGG-SP REAG RBC-IMP: NORMAL
ERYTHROCYTE [DISTWIDTH] IN BLOOD BY AUTOMATED COUNT: 17.1 % (ref 11.5–14.5)
GFR SERPLBLD CREATININE-BSD FMLA CKD-EPI: >60 ML/MIN/1.73/M2
GLUCOSE SERPL-MCNC: 105 MG/DL (ref 70–110)
HCT VFR BLD AUTO: 30.4 % (ref 40–54)
HGB BLD-MCNC: 10.1 GM/DL (ref 14–18)
IMM GRANULOCYTES # BLD AUTO: 0 K/UL (ref 0–0.04)
IMM GRANULOCYTES NFR BLD AUTO: 0 % (ref 0–0.5)
INDIRECT COOMBS: ABNORMAL
LYMPHOCYTES # BLD AUTO: 0.06 K/UL (ref 1–4.8)
MAGNESIUM SERPL-MCNC: 2 MG/DL (ref 1.6–2.6)
MCH RBC QN AUTO: 29.9 PG (ref 27–31)
MCHC RBC AUTO-ENTMCNC: 33.2 G/DL (ref 32–36)
MCV RBC AUTO: 90 FL (ref 82–98)
NUCLEATED RBC (/100WBC) (OHS): 0 /100 WBC
PHOSPHATE SERPL-MCNC: 3.4 MG/DL (ref 2.7–4.5)
PLATELET # BLD AUTO: 40 K/UL (ref 150–450)
PLATELET BLD QL SMEAR: ABNORMAL
PMV BLD AUTO: 11.5 FL (ref 9.2–12.9)
POTASSIUM SERPL-SCNC: 3.9 MMOL/L (ref 3.5–5.1)
PROT SERPL-MCNC: 5.6 GM/DL (ref 6–8.4)
RBC # BLD AUTO: 3.38 M/UL (ref 4.6–6.2)
RELATIVE EOSINOPHIL (OHS): 0 %
RELATIVE LYMPHOCYTE (OHS): 66.7 % (ref 18–48)
RELATIVE MONOCYTE (OHS): 22.2 % (ref 4–15)
RELATIVE NEUTROPHIL (OHS): 11.1 % (ref 38–73)
RH BLD: ABNORMAL
SODIUM SERPL-SCNC: 140 MMOL/L (ref 136–145)
SPECIMEN OUTDATE: ABNORMAL
WBC # BLD AUTO: 0.09 K/UL (ref 3.9–12.7)

## 2025-04-16 PROCEDURE — 25000003 PHARM REV CODE 250: Performed by: INTERNAL MEDICINE

## 2025-04-16 PROCEDURE — 86870 RBC ANTIBODY IDENTIFICATION: CPT | Performed by: INTERNAL MEDICINE

## 2025-04-16 PROCEDURE — 99233 SBSQ HOSP IP/OBS HIGH 50: CPT | Mod: ,,, | Performed by: INTERNAL MEDICINE

## 2025-04-16 PROCEDURE — 25000003 PHARM REV CODE 250: Performed by: NURSE PRACTITIONER

## 2025-04-16 PROCEDURE — 63600175 PHARM REV CODE 636 W HCPCS: Performed by: INTERNAL MEDICINE

## 2025-04-16 PROCEDURE — 86880 COOMBS TEST DIRECT: CPT | Performed by: INTERNAL MEDICINE

## 2025-04-16 PROCEDURE — 63600175 PHARM REV CODE 636 W HCPCS: Performed by: NURSE PRACTITIONER

## 2025-04-16 PROCEDURE — 86900 BLOOD TYPING SEROLOGIC ABO: CPT | Performed by: INTERNAL MEDICINE

## 2025-04-16 PROCEDURE — 83735 ASSAY OF MAGNESIUM: CPT | Performed by: NURSE PRACTITIONER

## 2025-04-16 PROCEDURE — 85025 COMPLETE CBC W/AUTO DIFF WBC: CPT | Performed by: INTERNAL MEDICINE

## 2025-04-16 PROCEDURE — 20600001 HC STEP DOWN PRIVATE ROOM

## 2025-04-16 PROCEDURE — 80053 COMPREHEN METABOLIC PANEL: CPT | Performed by: NURSE PRACTITIONER

## 2025-04-16 PROCEDURE — 84100 ASSAY OF PHOSPHORUS: CPT | Performed by: NURSE PRACTITIONER

## 2025-04-16 RX ORDER — MIRTAZAPINE 7.5 MG/1
7.5 TABLET, FILM COATED ORAL NIGHTLY
Status: DISCONTINUED | OUTPATIENT
Start: 2025-04-16 | End: 2025-04-18

## 2025-04-16 RX ORDER — SODIUM CHLORIDE 9 MG/ML
INJECTION, SOLUTION INTRAVENOUS CONTINUOUS
Status: DISCONTINUED | OUTPATIENT
Start: 2025-04-16 | End: 2025-04-21

## 2025-04-16 RX ADMIN — Medication 1 DOSE: at 08:04

## 2025-04-16 RX ADMIN — DULOXETINE HYDROCHLORIDE 20 MG: 20 CAPSULE, DELAYED RELEASE ORAL at 08:04

## 2025-04-16 RX ADMIN — ONDANSETRON 8 MG: 2 INJECTION INTRAMUSCULAR; INTRAVENOUS at 06:04

## 2025-04-16 RX ADMIN — HEPARIN SODIUM 1600 UNITS: 1000 INJECTION INTRAVENOUS; SUBCUTANEOUS at 03:04

## 2025-04-16 RX ADMIN — Medication 1 DOSE: at 09:04

## 2025-04-16 RX ADMIN — LEVOTHYROXINE SODIUM 88 MCG: 88 TABLET ORAL at 06:04

## 2025-04-16 RX ADMIN — PREGABALIN 75 MG: 75 CAPSULE ORAL at 09:04

## 2025-04-16 RX ADMIN — DICYCLOMINE HYDROCHLORIDE 10 MG: 10 CAPSULE ORAL at 08:04

## 2025-04-16 RX ADMIN — Medication 1 DOSE: at 04:04

## 2025-04-16 RX ADMIN — LOPERAMIDE HYDROCHLORIDE 2 MG: 2 CAPSULE ORAL at 12:04

## 2025-04-16 RX ADMIN — DICYCLOMINE HYDROCHLORIDE 10 MG: 10 CAPSULE ORAL at 12:04

## 2025-04-16 RX ADMIN — MIRTAZAPINE 7.5 MG: 7.5 TABLET, FILM COATED ORAL at 09:04

## 2025-04-16 RX ADMIN — ACYCLOVIR 800 MG: 200 CAPSULE ORAL at 08:04

## 2025-04-16 RX ADMIN — DIPHENOXYLATE HYDROCHLORIDE AND ATROPINE SULFATE 1 TABLET: 2.5; .025 TABLET ORAL at 09:04

## 2025-04-16 RX ADMIN — SODIUM CHLORIDE: 9 INJECTION, SOLUTION INTRAVENOUS at 08:04

## 2025-04-16 RX ADMIN — DICYCLOMINE HYDROCHLORIDE 10 MG: 10 CAPSULE ORAL at 09:04

## 2025-04-16 RX ADMIN — FLUCONAZOLE 400 MG: 200 TABLET ORAL at 08:04

## 2025-04-16 RX ADMIN — SODIUM CHLORIDE: 9 INJECTION, SOLUTION INTRAVENOUS at 09:04

## 2025-04-16 RX ADMIN — LOPERAMIDE HYDROCHLORIDE 2 MG: 2 CAPSULE ORAL at 04:04

## 2025-04-16 RX ADMIN — LOPERAMIDE HYDROCHLORIDE 2 MG: 2 CAPSULE ORAL at 08:04

## 2025-04-16 RX ADMIN — FILGRASTIM 480 MCG: 480 INJECTION, SOLUTION INTRAVENOUS; SUBCUTANEOUS at 08:04

## 2025-04-16 RX ADMIN — ONDANSETRON 8 MG: 2 INJECTION INTRAMUSCULAR; INTRAVENOUS at 09:04

## 2025-04-16 RX ADMIN — DICYCLOMINE HYDROCHLORIDE 10 MG: 10 CAPSULE ORAL at 04:04

## 2025-04-16 RX ADMIN — LOPERAMIDE HYDROCHLORIDE 2 MG: 2 CAPSULE ORAL at 09:04

## 2025-04-16 RX ADMIN — LEVOFLOXACIN 500 MG: 500 TABLET, FILM COATED ORAL at 08:04

## 2025-04-16 RX ADMIN — ACYCLOVIR 800 MG: 200 CAPSULE ORAL at 09:04

## 2025-04-16 RX ADMIN — HEPARIN SODIUM 1600 UNITS: 1000 INJECTION INTRAVENOUS; SUBCUTANEOUS at 06:04

## 2025-04-16 RX ADMIN — PANTOPRAZOLE SODIUM 40 MG: 40 TABLET, DELAYED RELEASE ORAL at 08:04

## 2025-04-16 RX ADMIN — Medication 1 DOSE: at 12:04

## 2025-04-16 RX ADMIN — ONDANSETRON 8 MG: 2 INJECTION INTRAMUSCULAR; INTRAVENOUS at 02:04

## 2025-04-16 NOTE — PROGRESS NOTES
[facility] ([facility])  Hematology  Bone Marrow Transplant  Progress Note    Patient Name: Mitchel Joya  Admission Date: 4/6/2025  Hospital Length of Stay: 10 days  Code Status: Full Code    Subjective:     Interval History: Day +8 from a Jennifer 200 auto SCT for MM. Reports nausea improved with ATC Zofran. BMx7. Hypotensive overnight. Will restart IVF. Stopped lovenox as platelets <50k.     Objective:     Vital Signs (Most Recent):  Temp: 98 °F (36.7 °C) (04/16/25 1107)  Pulse: 95 (04/16/25 1107)  Resp: 20 (04/16/25 1107)  BP: 105/71 (04/16/25 1107)  SpO2: 100 % (04/16/25 1107) Vital Signs (24h Range):  Temp:  [97.7 °F (36.5 °C)-98.8 °F (37.1 °C)] 98 °F (36.7 °C)  Pulse:  [86-95] 95  Resp:  [17-20] 20  SpO2:  [98 %-100 %] 100 %  BP: ()/(57-73) 105/71     Weight: 96.4 kg (212 lb 8.4 oz)  Body mass index is 31.38 kg/m².  Body surface area is 2.17 meters squared.      Intake/Output - Last 3 Shifts         04/14 0700  04/15 0659 04/15 0700  04/16 0659 04/16 0700  04/17 0659    P.O. 550 1400 250    I.V. (mL/kg)   181.7 (1.9)    Total Intake(mL/kg) 550 (5.7) 1400 (14.5) 431.7 (4.5)    Urine (mL/kg/hr) 500 (0.2) 800 (0.3)     Emesis/NG output 0 0     Other 0      Stool 0 0     Total Output 500 800     Net +50 +600 +431.7           Urine Occurrence 4 x 6 x 1 x    Stool Occurrence 5 x 7 x 1 x    Emesis Occurrence 0 x 1 x 0 x             Physical Exam  Constitutional:       Appearance: He is well-developed.   HENT:      Head: Normocephalic and atraumatic.      Mouth/Throat:      Pharynx: No oropharyngeal exudate.   Eyes:      General:         Right eye: No discharge.         Left eye: No discharge.      Conjunctiva/sclera: Conjunctivae normal.      Pupils: Pupils are equal, round, and reactive to light.   Cardiovascular:      Rate and Rhythm: Normal rate and regular rhythm.      Heart sounds: Normal heart sounds. No murmur heard.  Pulmonary:      Effort: Pulmonary effort is normal. No respiratory distress.       Breath sounds: Normal breath sounds. No wheezing or rales.   Abdominal:      General: Bowel sounds are normal. There is no distension.      Palpations: Abdomen is soft.      Tenderness: There is no abdominal tenderness.   Musculoskeletal:         General: No deformity. Normal range of motion.      Cervical back: Normal range of motion and neck supple.   Skin:     General: Skin is warm and dry.      Findings: No erythema or rash.      Comments: Right chest wall vas cath. Dressing c/d/i. No sign of infection to site.   Neurological:      Mental Status: He is alert and oriented to person, place, and time.   Psychiatric:         Behavior: Behavior normal.         Thought Content: Thought content normal.         Judgment: Judgment normal.            Significant Labs:   CBC:   Recent Labs   Lab 04/15/25  0413 04/16/25  0333   WBC 0.10* 0.09*   HGB 10.5* 10.1*   HCT 32.7* 30.4*   PLT 66* 40*    and CMP:   Recent Labs   Lab 04/15/25  0413 04/16/25  0333    140   K 3.7 3.9    107   CO2 25 24   BUN 8 9   CREATININE 0.6 0.6   CALCIUM 8.8 9.1   ALBUMIN 2.9* 2.9*   BILITOT 0.3 0.3   ALKPHOS 74 76   AST 15 12   ALT 20 20   ANIONGAP 8 9       Diagnostic Results:  None  Assessment/Plan:     * History of autologous stem cell transplant  Patient of Dr. Hemphill  - Coordinator: Xochilt Diaz  - Conditioning Regimen: Melphalan 200 mg/m2  - Cell Dose: 5-10 x 10^6 CD34/kg  - Maintenance: Korina+Rev  - Caregiver: Partner   - Housing: [facility]   - Admitted 4/6/25 for Jennifer 200 Auto. Today is Day +8.  - Stem cell infusion completed  4/08/25 at 11:30. Received 5 bags with a total CD34 dose of 4.39 x10^6/kg, tolerated without issue    Planned conditioning regimen:  Melphalan 200 on Day -1     Antimicrobial Prophylaxis:  Acyclovir starting on Day -1  Levofloxacin starting on Day -1  Fluconazole starting on Day -1  Bactrim starting on Day +30     Growth Factor Support:  Neupogen starting on Day +7     Multiple myeloma not having  achieved remission  Treatment history:  10/29/24: C1 Korina Vrd (notes state velcade has been held due to neuropathy and lenalidomide has been held due to cytopenias), remained on this until pre-transplant restaging  11/15/24: Palliative radiation to right hip    Restaging:  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  SPEP with 0.16 g/dL and 0.12 g/dL. FLCs normal.   PET/CT (3/7/25) - no new hypermetabolic lesions.    See Autologous Stem cell transplant     Immunosuppressed due to chemotherapy  - Will start ppx levaquin and diflucan   - On ppx Valtrex at home, no reported history of shingles. Transitioned to ppx acyclovir.     Hypercoagulable state  - On daily ASA at home, will hold while inpatient  - Ppx lovenox until platelets <50k, stopped today 4/15    Immunocompromised state associated with stem cell transplant  - See Multiple myeloma not having achieved remission   - See Autologous stem cell transplant     Chemotherapy-induced diarrhea  - C-diff neg  - Started Imodium QID, PRN  - changed Imodium to ATC on 4/15 and PRN Lomotil added   - IVF restarted 4/16    Pancytopenia due to chemotherapy  - Anticipated following chemotherapy.  - Transfuse for hgb < 7 or plts < 10K.  - Daily CBC while IP.  - Continuing antimicrobial ppx.    Chemotherapy-induced nausea  - has PRN Zofran and Compazine  - Zofran to ATC on 4/15    Thrombocytopenia  - Anticipate pancytopenia following chemotherapy.  - Transfuse for plts < 10K.  - hold anticoagulation when platelets <50k  - Daily CBC while IP.    At risk for malnutrition  - RD/Nutrition consulted per BMT protocol    Hypotension  - likely due to volume depletion, diarrhea and poor po intake  - amlodipine stopped 4/15  - will restart IVF    Elevated BP without diagnosis of hypertension  - BP consistently high normal, not on antihypertensives at home.   - Monitoring closely  - amlodipine started on  4/10  - PRN hydralazine available.  - Stopped IVF 4/9. Will resume with reduced oral intake or with fluid losses through diarrhea or emesis.  - BP has normalized, amlodipine stopped 4/15   - now hypotensive, IVF restarted    Insomnia  - reports minimal relief with PRN Melatonin  - cluster care at night     Neoplastic (malignant) related fatigue  - PT/OT consulted per BMT protocol    Peripheral neuropathy  - Continue home Lyrica    Hypothyroid  - Continue home synthroid    Hyperlipidemia  - Holding home crestor while inpatient     Obesity (BMI 30.0-34.9)  - Body mass index is 31.38 kg/m². Morbid obesity complicates all aspects of disease management from diagnostic modalities to treatment. Weight loss encouraged and health benefits explained to patient.          VTE Risk Mitigation (From admission, onward)           Ordered     heparin, porcine (PF) 100 unit/mL injection flush 300 Units  As needed (PRN)         04/06/25 2212     heparin (porcine) injection 1,600 Units  As needed (PRN)         04/06/25 2203     IP VTE HIGH RISK PATIENT  Once         04/06/25 1824     Place sequential compression device  Until discontinued         04/06/25 1824                    Disposition: inpatient     Dulce Maria Saul NP  Bone Marrow Transplant  [facility] ([facility])

## 2025-04-16 NOTE — PLAN OF CARE
Patient is AAOx4. Day +8 from a Jennifer 200 auto SCT for MM; no acute events this shift. Up, independent and family at bedside. Afebrile & VSS on room air. No PRNs given. NS going @ 75 mL/hr. Ambulates independently to bathroom; educated on importance of I/O recording. CHG wipes provided and encouraged use. Tolerating diet with good intake and voiding without difficulty. Pt remaining free from falls or injury throughout shift; bed locked and in lowest position; call light within reach. POC reviewed with patient and family at bedside. Patient involved in care. All needs addressed. Frequent rounding performed. Patient is stable at this time.

## 2025-04-16 NOTE — ASSESSMENT & PLAN NOTE
- likely due to volume depletion, diarrhea and poor po intake  - amlodipine stopped 4/15  - will restart IVF

## 2025-04-16 NOTE — SUBJECTIVE & OBJECTIVE
Subjective:     Interval History: Day +8 from a Jennifer 200 auto SCT for MM. Reports nausea improved with ATC Zofran. BMx7. Hypotensive overnight. Will restart IVF. Stopped lovenox as platelets <50k.     Objective:     Vital Signs (Most Recent):  Temp: 98 °F (36.7 °C) (04/16/25 1107)  Pulse: 95 (04/16/25 1107)  Resp: 20 (04/16/25 1107)  BP: 105/71 (04/16/25 1107)  SpO2: 100 % (04/16/25 1107) Vital Signs (24h Range):  Temp:  [97.7 °F (36.5 °C)-98.8 °F (37.1 °C)] 98 °F (36.7 °C)  Pulse:  [86-95] 95  Resp:  [17-20] 20  SpO2:  [98 %-100 %] 100 %  BP: ()/(57-73) 105/71     Weight: 96.4 kg (212 lb 8.4 oz)  Body mass index is 31.38 kg/m².  Body surface area is 2.17 meters squared.      Intake/Output - Last 3 Shifts         04/14 0700  04/15 0659 04/15 0700  04/16 0659 04/16 0700  04/17 0659    P.O. 550 1400 250    I.V. (mL/kg)   181.7 (1.9)    Total Intake(mL/kg) 550 (5.7) 1400 (14.5) 431.7 (4.5)    Urine (mL/kg/hr) 500 (0.2) 800 (0.3)     Emesis/NG output 0 0     Other 0      Stool 0 0     Total Output 500 800     Net +50 +600 +431.7           Urine Occurrence 4 x 6 x 1 x    Stool Occurrence 5 x 7 x 1 x    Emesis Occurrence 0 x 1 x 0 x             Physical Exam  Constitutional:       Appearance: He is well-developed.   HENT:      Head: Normocephalic and atraumatic.      Mouth/Throat:      Pharynx: No oropharyngeal exudate.   Eyes:      General:         Right eye: No discharge.         Left eye: No discharge.      Conjunctiva/sclera: Conjunctivae normal.      Pupils: Pupils are equal, round, and reactive to light.   Cardiovascular:      Rate and Rhythm: Normal rate and regular rhythm.      Heart sounds: Normal heart sounds. No murmur heard.  Pulmonary:      Effort: Pulmonary effort is normal. No respiratory distress.      Breath sounds: Normal breath sounds. No wheezing or rales.   Abdominal:      General: Bowel sounds are normal. There is no distension.      Palpations: Abdomen is soft.      Tenderness: There is no  abdominal tenderness.   Musculoskeletal:         General: No deformity. Normal range of motion.      Cervical back: Normal range of motion and neck supple.   Skin:     General: Skin is warm and dry.      Findings: No erythema or rash.      Comments: Right chest wall vas cath. Dressing c/d/i. No sign of infection to site.   Neurological:      Mental Status: He is alert and oriented to person, place, and time.   Psychiatric:         Behavior: Behavior normal.         Thought Content: Thought content normal.         Judgment: Judgment normal.            Significant Labs:   CBC:   Recent Labs   Lab 04/15/25  0413 04/16/25  0333   WBC 0.10* 0.09*   HGB 10.5* 10.1*   HCT 32.7* 30.4*   PLT 66* 40*    and CMP:   Recent Labs   Lab 04/15/25  0413 04/16/25  0333    140   K 3.7 3.9    107   CO2 25 24   BUN 8 9   CREATININE 0.6 0.6   CALCIUM 8.8 9.1   ALBUMIN 2.9* 2.9*   BILITOT 0.3 0.3   ALKPHOS 74 76   AST 15 12   ALT 20 20   ANIONGAP 8 9       Diagnostic Results:  None

## 2025-04-16 NOTE — ASSESSMENT & PLAN NOTE
- C-diff neg  - Started Imodium QID, PRN  - changed Imodium to ATC on 4/15 and PRN Lomotil added   - IVF restarted 4/16

## 2025-04-16 NOTE — ASSESSMENT & PLAN NOTE
- Body mass index is 31.38 kg/m². Morbid obesity complicates all aspects of disease management from diagnostic modalities to treatment. Weight loss encouraged and health benefits explained to patient.

## 2025-04-16 NOTE — PLAN OF CARE
- Day +8 Jennifer Auto SCT for MM.   - Watery BM *3, PRN imodium given *1.  - scheduled zofran given for nausea.  - PRN melatonin given.     Went over POC with pt at beginning of shift.  Questions were asked and answered.  AAO x 4.  VSS.  Up to toilet independently. Voiding without difficulty. No complaints of pain.  Pt remained free from injury with bed in low locked position, call light with in reach, non-skid socks, and frequent rounding.  Pt instructed to call for assistance as needed. Wife at bedside. Denies needs at this time.

## 2025-04-16 NOTE — PROGRESS NOTES
Attempted to see patient for follow up. Sleeping soundly at this time.  Will re-attempt.  Will continue to follow and assist as needed.

## 2025-04-16 NOTE — ASSESSMENT & PLAN NOTE
- On daily ASA at home, will hold while inpatient  - Ppx lovenox until platelets <50k, stopped today 4/15

## 2025-04-16 NOTE — ASSESSMENT & PLAN NOTE
Patient of Dr. Hemphill  - Coordinator: Xochilt Diaz  - Conditioning Regimen: Melphalan 200 mg/m2  - Cell Dose: 5-10 x 10^6 CD34/kg  - Maintenance: Korina+Rev  - Caregiver: Partner   - Housing: [facility]   - Admitted 4/6/25 for Jennifer 200 Auto. Today is Day +8.  - Stem cell infusion completed  4/08/25 at 11:30. Received 5 bags with a total CD34 dose of 4.39 x10^6/kg, tolerated without issue    Planned conditioning regimen:  Melphalan 200 on Day -1     Antimicrobial Prophylaxis:  Acyclovir starting on Day -1  Levofloxacin starting on Day -1  Fluconazole starting on Day -1  Bactrim starting on Day +30     Growth Factor Support:  Neupogen starting on Day +7

## 2025-04-16 NOTE — ASSESSMENT & PLAN NOTE
- BP consistently high normal, not on antihypertensives at home.   - Monitoring closely  - amlodipine started on 4/10  - PRN hydralazine available.  - Stopped IVF 4/9. Will resume with reduced oral intake or with fluid losses through diarrhea or emesis.  - BP has normalized, amlodipine stopped 4/15   - now hypotensive, IVF restarted

## 2025-04-17 LAB
ABSOLUTE EOSINOPHIL (OHS): 0 K/UL
ABSOLUTE MONOCYTE (OHS): 0.01 K/UL (ref 0.3–1)
ABSOLUTE NEUTROPHIL COUNT (OHS): 0 K/UL (ref 1.8–7.7)
ALBUMIN SERPL BCP-MCNC: 2.7 G/DL (ref 3.5–5.2)
ALP SERPL-CCNC: 75 UNIT/L (ref 40–150)
ALT SERPL W/O P-5'-P-CCNC: 19 UNIT/L (ref 10–44)
ANION GAP (OHS): 6 MMOL/L (ref 8–16)
AST SERPL-CCNC: 12 UNIT/L (ref 11–45)
BASOPHILS # BLD AUTO: 0.01 K/UL
BASOPHILS NFR BLD AUTO: 14.3 %
BILIRUB SERPL-MCNC: 0.3 MG/DL (ref 0.1–1)
BUN SERPL-MCNC: 8 MG/DL (ref 8–23)
CALCIUM SERPL-MCNC: 8.9 MG/DL (ref 8.7–10.5)
CHLORIDE SERPL-SCNC: 108 MMOL/L (ref 95–110)
CO2 SERPL-SCNC: 26 MMOL/L (ref 23–29)
CREAT SERPL-MCNC: 0.7 MG/DL (ref 0.5–1.4)
ERYTHROCYTE [DISTWIDTH] IN BLOOD BY AUTOMATED COUNT: 16.7 % (ref 11.5–14.5)
GFR SERPLBLD CREATININE-BSD FMLA CKD-EPI: >60 ML/MIN/1.73/M2
GLUCOSE SERPL-MCNC: 103 MG/DL (ref 70–110)
HCT VFR BLD AUTO: 29.6 % (ref 40–54)
HGB BLD-MCNC: 9.8 GM/DL (ref 14–18)
IMM GRANULOCYTES # BLD AUTO: 0 K/UL (ref 0–0.04)
IMM GRANULOCYTES NFR BLD AUTO: 0 % (ref 0–0.5)
LYMPHOCYTES # BLD AUTO: 0.05 K/UL (ref 1–4.8)
MAGNESIUM SERPL-MCNC: 2 MG/DL (ref 1.6–2.6)
MCH RBC QN AUTO: 30.3 PG (ref 27–31)
MCHC RBC AUTO-ENTMCNC: 33.1 G/DL (ref 32–36)
MCV RBC AUTO: 92 FL (ref 82–98)
NUCLEATED RBC (/100WBC) (OHS): 0 /100 WBC
PHOSPHATE SERPL-MCNC: 3.1 MG/DL (ref 2.7–4.5)
PLATELET # BLD AUTO: 15 K/UL (ref 150–450)
PLATELET BLD QL SMEAR: ABNORMAL
PMV BLD AUTO: 10.7 FL (ref 9.2–12.9)
POTASSIUM SERPL-SCNC: 3.5 MMOL/L (ref 3.5–5.1)
PROT SERPL-MCNC: 5.4 GM/DL (ref 6–8.4)
RBC # BLD AUTO: 3.23 M/UL (ref 4.6–6.2)
RELATIVE EOSINOPHIL (OHS): 0 %
RELATIVE LYMPHOCYTE (OHS): 71.4 % (ref 18–48)
RELATIVE MONOCYTE (OHS): 14.3 % (ref 4–15)
RELATIVE NEUTROPHIL (OHS): 0 % (ref 38–73)
SODIUM SERPL-SCNC: 140 MMOL/L (ref 136–145)
WBC # BLD AUTO: 0.07 K/UL (ref 3.9–12.7)

## 2025-04-17 PROCEDURE — 25000003 PHARM REV CODE 250: Performed by: INTERNAL MEDICINE

## 2025-04-17 PROCEDURE — 83735 ASSAY OF MAGNESIUM: CPT | Performed by: NURSE PRACTITIONER

## 2025-04-17 PROCEDURE — 25000003 PHARM REV CODE 250: Performed by: NURSE PRACTITIONER

## 2025-04-17 PROCEDURE — 63600175 PHARM REV CODE 636 W HCPCS: Performed by: INTERNAL MEDICINE

## 2025-04-17 PROCEDURE — 85025 COMPLETE CBC W/AUTO DIFF WBC: CPT | Performed by: INTERNAL MEDICINE

## 2025-04-17 PROCEDURE — 80053 COMPREHEN METABOLIC PANEL: CPT | Performed by: NURSE PRACTITIONER

## 2025-04-17 PROCEDURE — 63600175 PHARM REV CODE 636 W HCPCS: Performed by: NURSE PRACTITIONER

## 2025-04-17 PROCEDURE — 84100 ASSAY OF PHOSPHORUS: CPT | Performed by: NURSE PRACTITIONER

## 2025-04-17 PROCEDURE — 20600001 HC STEP DOWN PRIVATE ROOM

## 2025-04-17 PROCEDURE — 99233 SBSQ HOSP IP/OBS HIGH 50: CPT | Mod: ,,, | Performed by: INTERNAL MEDICINE

## 2025-04-17 RX ORDER — DIPHENOXYLATE HYDROCHLORIDE AND ATROPINE SULFATE 2.5; .025 MG/1; MG/1
1 TABLET ORAL 4 TIMES DAILY
Status: DISCONTINUED | OUTPATIENT
Start: 2025-04-17 | End: 2025-04-21

## 2025-04-17 RX ADMIN — LOPERAMIDE HYDROCHLORIDE 2 MG: 2 CAPSULE ORAL at 09:04

## 2025-04-17 RX ADMIN — MIRTAZAPINE 7.5 MG: 7.5 TABLET, FILM COATED ORAL at 09:04

## 2025-04-17 RX ADMIN — FILGRASTIM 480 MCG: 480 INJECTION, SOLUTION INTRAVENOUS; SUBCUTANEOUS at 09:04

## 2025-04-17 RX ADMIN — DIPHENOXYLATE HYDROCHLORIDE AND ATROPINE SULFATE 1 TABLET: 2.5; .025 TABLET ORAL at 09:04

## 2025-04-17 RX ADMIN — Medication 1 DOSE: at 10:04

## 2025-04-17 RX ADMIN — Medication 1 DOSE: at 09:04

## 2025-04-17 RX ADMIN — ONDANSETRON 8 MG: 2 INJECTION INTRAMUSCULAR; INTRAVENOUS at 01:04

## 2025-04-17 RX ADMIN — PREGABALIN 75 MG: 75 CAPSULE ORAL at 09:04

## 2025-04-17 RX ADMIN — DIPHENOXYLATE HYDROCHLORIDE AND ATROPINE SULFATE 1 TABLET: 2.5; .025 TABLET ORAL at 01:04

## 2025-04-17 RX ADMIN — LOPERAMIDE HYDROCHLORIDE 2 MG: 2 CAPSULE ORAL at 01:04

## 2025-04-17 RX ADMIN — Medication 1 DOSE: at 05:04

## 2025-04-17 RX ADMIN — DULOXETINE HYDROCHLORIDE 20 MG: 20 CAPSULE, DELAYED RELEASE ORAL at 10:04

## 2025-04-17 RX ADMIN — ONDANSETRON 8 MG: 2 INJECTION INTRAMUSCULAR; INTRAVENOUS at 05:04

## 2025-04-17 RX ADMIN — ACYCLOVIR 800 MG: 200 CAPSULE ORAL at 09:04

## 2025-04-17 RX ADMIN — LEVOFLOXACIN 500 MG: 500 TABLET, FILM COATED ORAL at 10:04

## 2025-04-17 RX ADMIN — ACYCLOVIR 800 MG: 200 CAPSULE ORAL at 10:04

## 2025-04-17 RX ADMIN — LOPERAMIDE HYDROCHLORIDE 2 MG: 2 CAPSULE ORAL at 05:04

## 2025-04-17 RX ADMIN — DICYCLOMINE HYDROCHLORIDE 10 MG: 10 CAPSULE ORAL at 05:04

## 2025-04-17 RX ADMIN — LEVOTHYROXINE SODIUM 88 MCG: 88 TABLET ORAL at 05:04

## 2025-04-17 RX ADMIN — DICYCLOMINE HYDROCHLORIDE 10 MG: 10 CAPSULE ORAL at 09:04

## 2025-04-17 RX ADMIN — DICYCLOMINE HYDROCHLORIDE 10 MG: 10 CAPSULE ORAL at 01:04

## 2025-04-17 RX ADMIN — DICYCLOMINE HYDROCHLORIDE 10 MG: 10 CAPSULE ORAL at 10:04

## 2025-04-17 RX ADMIN — Medication 1 DOSE: at 01:04

## 2025-04-17 RX ADMIN — DIPHENOXYLATE HYDROCHLORIDE AND ATROPINE SULFATE 1 TABLET: 2.5; .025 TABLET ORAL at 10:04

## 2025-04-17 RX ADMIN — DIPHENOXYLATE HYDROCHLORIDE AND ATROPINE SULFATE 1 TABLET: 2.5; .025 TABLET ORAL at 05:04

## 2025-04-17 RX ADMIN — LOPERAMIDE HYDROCHLORIDE 2 MG: 2 CAPSULE ORAL at 10:04

## 2025-04-17 RX ADMIN — HEPARIN SODIUM 1600 UNITS: 1000 INJECTION INTRAVENOUS; SUBCUTANEOUS at 03:04

## 2025-04-17 RX ADMIN — ONDANSETRON 8 MG: 2 INJECTION INTRAMUSCULAR; INTRAVENOUS at 09:04

## 2025-04-17 RX ADMIN — SODIUM CHLORIDE: 9 INJECTION, SOLUTION INTRAVENOUS at 10:04

## 2025-04-17 RX ADMIN — PANTOPRAZOLE SODIUM 40 MG: 40 TABLET, DELAYED RELEASE ORAL at 10:04

## 2025-04-17 RX ADMIN — FLUCONAZOLE 400 MG: 200 TABLET ORAL at 10:04

## 2025-04-17 RX ADMIN — POTASSIUM CHLORIDE 20 MEQ: 1500 TABLET, EXTENDED RELEASE ORAL at 05:04

## 2025-04-17 NOTE — ASSESSMENT & PLAN NOTE
Patient of Dr. Hemphill  - Coordinator: Xochilt Diaz  - Conditioning Regimen: Melphalan 200 mg/m2  - Cell Dose: 5-10 x 10^6 CD34/kg  - Maintenance: Korina+Rev  - Caregiver: Partner   - Housing: [facility]   - Admitted 4/6/25 for Jennifer 200 Auto. Today is Day +9.  - Stem cell infusion completed  4/08/25 at 11:30. Received 5 bags with a total CD34 dose of 4.39 x10^6/kg, tolerated without issue    Planned conditioning regimen:  Melphalan 200 on Day -1     Antimicrobial Prophylaxis:  Acyclovir starting on Day -1  Levofloxacin starting on Day -1  Fluconazole starting on Day -1  Bactrim starting on Day +30     Growth Factor Support:  Neupogen starting on Day +7

## 2025-04-17 NOTE — ASSESSMENT & PLAN NOTE
- Body mass index is 31.77 kg/m². Morbid obesity complicates all aspects of disease management from diagnostic modalities to treatment. Weight loss encouraged and health benefits explained to patient.

## 2025-04-17 NOTE — PLAN OF CARE
- Day +9 Jennifer Auto SCT for MM.   - Watery BM *2, PRN lomotil and scheduled imodium given.  - scheduled zofran given for nausea.    Went over POC with pt at beginning of shift.  Questions were asked and answered.  AAO x 4.  VSS.  Up to toilet independently. Voiding without difficulty. No complaints of pain.  Pt remained free from injury with bed in low locked position, call light with in reach, non-skid socks, and frequent rounding.  Pt instructed to call for assistance as needed. Wife at bedside. Denies needs at this time.

## 2025-04-17 NOTE — PLAN OF CARE
Plan of care reviewed with patient and family.  Fall precautions maintained, side rails up x2, call light  in reach, bed in low position and locked, nonskid socks on.  Patient has normal saline at 75cc/hr without difficulty.  Voiding without difficulty.  No complaints voiced.

## 2025-04-17 NOTE — PROGRESS NOTES
[facility] ([facility])  Hematology  Bone Marrow Transplant  Progress Note    Patient Name: Mitchel Joya  Admission Date: 4/6/2025  Hospital Length of Stay: 11 days  Code Status: Full Code    Subjective:     Interval History: Day +9 from a Jennifer 200 auto SCT for MM. Nausea controlled. BM x 7, will change lomotil to ATC. BP improved with IVF. Reports slept well with addition of Mirtazapine.     Objective:     Vital Signs (Most Recent):  Temp: 98.4 °F (36.9 °C) (04/17/25 0357)  Pulse: 87 (04/17/25 0357)  Resp: 18 (04/17/25 0357)  BP: (!) 110/58 (04/17/25 0357)  SpO2: 95 % (04/17/25 0357) Vital Signs (24h Range):  Temp:  [98 °F (36.7 °C)-99.5 °F (37.5 °C)] 98.4 °F (36.9 °C)  Pulse:  [82-95] 87  Resp:  [18-20] 18  SpO2:  [95 %-100 %] 95 %  BP: ()/(58-77) 110/58     Weight: 97.6 kg (215 lb 2.7 oz)  Body mass index is 31.77 kg/m².  Body surface area is 2.18 meters squared.      Intake/Output - Last 3 Shifts         04/15 0700  04/16 0659 04/16 0700  04/17 0659 04/17 0700  04/18 0659    P.O. 1400 950     I.V. (mL/kg)  753.7 (7.7)     Total Intake(mL/kg) 1400 (14.5) 1703.7 (17.5)     Urine (mL/kg/hr) 800 (0.3) 500 (0.2)     Emesis/NG output 0      Other       Stool 0      Total Output 800 500     Net +600 +1203.7            Urine Occurrence 6 x 7 x     Stool Occurrence 7 x 7 x     Emesis Occurrence 1 x 0 x              Physical Exam  Constitutional:       Appearance: He is well-developed.   HENT:      Head: Normocephalic and atraumatic.      Mouth/Throat:      Pharynx: No oropharyngeal exudate.   Eyes:      General:         Right eye: No discharge.         Left eye: No discharge.      Conjunctiva/sclera: Conjunctivae normal.      Pupils: Pupils are equal, round, and reactive to light.   Cardiovascular:      Rate and Rhythm: Normal rate and regular rhythm.      Heart sounds: Normal heart sounds. No murmur heard.  Pulmonary:      Effort: Pulmonary effort is normal. No respiratory distress.      Breath sounds:  Normal breath sounds. No wheezing or rales.   Abdominal:      General: Bowel sounds are normal. There is no distension.      Palpations: Abdomen is soft.      Tenderness: There is no abdominal tenderness.   Musculoskeletal:         General: No deformity. Normal range of motion.      Cervical back: Normal range of motion and neck supple.   Skin:     General: Skin is warm and dry.      Findings: No erythema or rash.      Comments: Right chest wall vas cath. Dressing c/d/i. No sign of infection to site.   Neurological:      Mental Status: He is alert and oriented to person, place, and time.   Psychiatric:         Behavior: Behavior normal.         Thought Content: Thought content normal.         Judgment: Judgment normal.            Significant Labs:   CBC:   Recent Labs   Lab 04/16/25  0333 04/17/25  0350   WBC 0.09* 0.07*   HGB 10.1* 9.8*   HCT 30.4* 29.6*   PLT 40* 15*    and CMP:   Recent Labs   Lab 04/16/25  0333 04/17/25  0350    140   K 3.9 3.5    108   CO2 24 26   BUN 9 8   CREATININE 0.6 0.7   CALCIUM 9.1 8.9   ALBUMIN 2.9* 2.7*   BILITOT 0.3 0.3   ALKPHOS 76 75   AST 12 12   ALT 20 19   ANIONGAP 9 6*       Diagnostic Results:  None  Assessment/Plan:     * History of autologous stem cell transplant  Patient of Dr. Hemphill  - Coordinator: Xochilt Diaz  - Conditioning Regimen: Melphalan 200 mg/m2  - Cell Dose: 5-10 x 10^6 CD34/kg  - Maintenance: Korina+Rev  - Caregiver: Partner   - Housing: [facility]   - Admitted 4/6/25 for Jennifer 200 Auto. Today is Day +9.  - Stem cell infusion completed  4/08/25 at 11:30. Received 5 bags with a total CD34 dose of 4.39 x10^6/kg, tolerated without issue    Planned conditioning regimen:  Melphalan 200 on Day -1     Antimicrobial Prophylaxis:  Acyclovir starting on Day -1  Levofloxacin starting on Day -1  Fluconazole starting on Day -1  Bactrim starting on Day +30     Growth Factor Support:  Neupogen starting on Day +7     Multiple myeloma not having achieved  remission  Treatment history:  10/29/24: C1 Korina Vrd (notes state velcade has been held due to neuropathy and lenalidomide has been held due to cytopenias), remained on this until pre-transplant restaging  11/15/24: Palliative radiation to right hip    Restaging:  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  SPEP with 0.16 g/dL and 0.12 g/dL. FLCs normal.   PET/CT (3/7/25) - no new hypermetabolic lesions.    See Autologous Stem cell transplant     Immunosuppressed due to chemotherapy  - continue ppx levaquin and diflucan   - On ppx Valtrex at home, no reported history of shingles. Transitioned to ppx acyclovir.     Hypercoagulable state  - On daily ASA at home, will hold while inpatient  - Ppx lovenox until platelets <50k, stopped 4/15    Immunocompromised state associated with stem cell transplant  - See Multiple myeloma not having achieved remission   - See Autologous stem cell transplant     Chemotherapy-induced diarrhea  - C-diff neg  - Started Imodium QID, PRN  - changed Imodium to ATC on 4/15 and PRN Lomotil added   - IVF restarted 4/16  - will change Lomotil to ATC today    Pancytopenia due to chemotherapy  - Anticipated following chemotherapy.  - Transfuse for hgb < 7 or plts < 10K.  - Daily CBC while IP.  - Continuing antimicrobial ppx.    Chemotherapy-induced nausea  - Zofran to ATC on 4/15  - has PRN Compazine available  Controlled on current regimen    Thrombocytopenia  - Anticipate pancytopenia following chemotherapy.  - Transfuse for plts < 10K.  - hold anticoagulation when platelets <50k  - Daily CBC while IP.    At risk for malnutrition  - RD/Nutrition consulted per BMT protocol    Hypotension  - likely due to volume depletion, diarrhea and poor po intake  - amlodipine stopped 4/15  - restarted IVF 4/16 with improvement     Elevated BP without diagnosis of hypertension  - BP consistently high normal, not on  antihypertensives at home.   - Monitoring closely  - amlodipine started on 4/10  - PRN hydralazine available.  - Stopped IVF 4/9  - BP normalized, amlodipine stopped 4/15   - now hypotensive 4/16, IVF restarted    Insomnia  - reports minimal relief with PRN Melatonin  - Mirtazapine started 4/16, patient reports worked well  - cluster care at night     Neoplastic (malignant) related fatigue  - PT/OT consulted per BMT protocol    Peripheral neuropathy  - Continue home Lyrica    Hypothyroid  - Continue home synthroid    Hyperlipidemia  - Holding home crestor while inpatient     Obesity (BMI 30.0-34.9)  - Body mass index is 31.77 kg/m². Morbid obesity complicates all aspects of disease management from diagnostic modalities to treatment. Weight loss encouraged and health benefits explained to patient.          VTE Risk Mitigation (From admission, onward)           Ordered     heparin, porcine (PF) 100 unit/mL injection flush 300 Units  As needed (PRN)         04/06/25 2212     heparin (porcine) injection 1,600 Units  As needed (PRN)         04/06/25 2203     IP VTE HIGH RISK PATIENT  Once         04/06/25 1824     Place sequential compression device  Until discontinued         04/06/25 1824                    Disposition: inpatient    Dulce Maria Saul NP  Bone Marrow Transplant  [facility] ([facility])

## 2025-04-17 NOTE — ASSESSMENT & PLAN NOTE
- reports minimal relief with PRN Melatonin  - Mirtazapine started 4/16, patient reports worked well  - cluster care at night

## 2025-04-17 NOTE — PROGRESS NOTES
Pt seen for follow up. Sleeping better and GI issues improving.  Expected at [facility] on 4/22.  No other needs identified at this time. Will continue to follow and assist as needed.

## 2025-04-17 NOTE — PROGRESS NOTES
[facility] ([facility])  Hematology  Bone Marrow Transplant  Progress Note    Patient Name: Mitchel Joya  Admission Date: 4/6/2025  Hospital Length of Stay: 11 days  Code Status: Full Code    Subjective:     Interval History: Day +9 from a Jennifer 200 auto SCT for MM. Nausea controlled. BM x 7, will change lomotil to ATC. BP improved with IVF. Reports slept well with addition of Mirtazapine.     Objective:     Vital Signs (Most Recent):  Temp: 98.4 °F (36.9 °C) (04/17/25 0357)  Pulse: 87 (04/17/25 0357)  Resp: 18 (04/17/25 0357)  BP: (!) 110/58 (04/17/25 0357)  SpO2: 95 % (04/17/25 0357) Vital Signs (24h Range):  Temp:  [98 °F (36.7 °C)-99.5 °F (37.5 °C)] 98.4 °F (36.9 °C)  Pulse:  [82-95] 87  Resp:  [18-20] 18  SpO2:  [95 %-100 %] 95 %  BP: ()/(58-77) 110/58     Weight: 97.6 kg (215 lb 2.7 oz)  Body mass index is 31.77 kg/m².  Body surface area is 2.18 meters squared.      Intake/Output - Last 3 Shifts         04/15 0700  04/16 0659 04/16 0700  04/17 0659 04/17 0700  04/18 0659    P.O. 1400 950     I.V. (mL/kg)  753.7 (7.7)     Total Intake(mL/kg) 1400 (14.5) 1703.7 (17.5)     Urine (mL/kg/hr) 800 (0.3) 500 (0.2)     Emesis/NG output 0      Other       Stool 0      Total Output 800 500     Net +600 +1203.7            Urine Occurrence 6 x 7 x     Stool Occurrence 7 x 7 x     Emesis Occurrence 1 x 0 x              Physical Exam  Constitutional:       Appearance: He is well-developed.   HENT:      Head: Normocephalic and atraumatic.      Mouth/Throat:      Pharynx: No oropharyngeal exudate.   Eyes:      General:         Right eye: No discharge.         Left eye: No discharge.      Conjunctiva/sclera: Conjunctivae normal.      Pupils: Pupils are equal, round, and reactive to light.   Cardiovascular:      Rate and Rhythm: Normal rate and regular rhythm.      Heart sounds: Normal heart sounds. No murmur heard.  Pulmonary:      Effort: Pulmonary effort is normal. No respiratory distress.      Breath sounds:  Normal breath sounds. No wheezing or rales.   Abdominal:      General: Bowel sounds are normal. There is no distension.      Palpations: Abdomen is soft.      Tenderness: There is no abdominal tenderness.   Musculoskeletal:         General: No deformity. Normal range of motion.      Cervical back: Normal range of motion and neck supple.   Skin:     General: Skin is warm and dry.      Findings: No erythema or rash.      Comments: Right chest wall vas cath. Dressing c/d/i. No sign of infection to site.   Neurological:      Mental Status: He is alert and oriented to person, place, and time.   Psychiatric:         Behavior: Behavior normal.         Thought Content: Thought content normal.         Judgment: Judgment normal.            Significant Labs:   CBC:   Recent Labs   Lab 04/16/25  0333 04/17/25  0350   WBC 0.09* 0.07*   HGB 10.1* 9.8*   HCT 30.4* 29.6*   PLT 40* 15*    and CMP:   Recent Labs   Lab 04/16/25  0333 04/17/25  0350    140   K 3.9 3.5    108   CO2 24 26   BUN 9 8   CREATININE 0.6 0.7   CALCIUM 9.1 8.9   ALBUMIN 2.9* 2.7*   BILITOT 0.3 0.3   ALKPHOS 76 75   AST 12 12   ALT 20 19   ANIONGAP 9 6*       Diagnostic Results:  None  Assessment/Plan:     * History of autologous stem cell transplant  Patient of Dr. Hemphill  - Coordinator: Xochilt Diaz  - Conditioning Regimen: Melphalan 200 mg/m2  - Cell Dose: 5-10 x 10^6 CD34/kg  - Maintenance: Korina+Rev  - Caregiver: Partner   - [facility]: [facility]   - Admitted 4/6/25 for Jennifer 200 Auto. Today is Day +9.  - Stem cell infusion completed  4/08/25 at 11:30. Received 5 bags with a total CD34 dose of 4.39 x10^6/kg, tolerated without issue    Planned conditioning regimen:  Melphalan 200 on Day -1     Antimicrobial Prophylaxis:  Acyclovir starting on Day -1  Levofloxacin starting on Day -1  Fluconazole starting on Day -1  Bactrim starting on Day +30     Growth Factor Support:  Neupogen starting on Day +7     Multiple myeloma not having achieved  remission  Treatment history:  10/29/24: C1 Korina Vrd (notes state velcade has been held due to neuropathy and lenalidomide has been held due to cytopenias), remained on this until pre-transplant restaging  11/15/24: Palliative radiation to right hip    Restaging:  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  SPEP with 0.16 g/dL and 0.12 g/dL. FLCs normal.   PET/CT (3/7/25) - no new hypermetabolic lesions.    See Autologous Stem cell transplant     Immunosuppressed due to chemotherapy  - continue ppx levaquin and diflucan   - On ppx Valtrex at home, no reported history of shingles. Transitioned to ppx acyclovir.     Hypercoagulable state  - On daily ASA at home, will hold while inpatient  - Ppx lovenox until platelets <50k, stopped 4/15    Immunocompromised state associated with stem cell transplant  - See Multiple myeloma not having achieved remission   - See Autologous stem cell transplant     Chemotherapy-induced diarrhea  - C-diff neg  - Started Imodium QID, PRN  - changed Imodium to ATC on 4/15 and PRN Lomotil added   - IVF restarted 4/16  - will change Lomotil to ATC today    Pancytopenia due to chemotherapy  - Anticipated following chemotherapy.  - Transfuse for hgb < 7 or plts < 10K.  - Daily CBC while IP.  - Continuing antimicrobial ppx.    Chemotherapy-induced nausea  - Zofran to ATC on 4/15  - has PRN Compazine available  Controlled on current regimen    Thrombocytopenia  - Anticipate pancytopenia following chemotherapy.  - Transfuse for plts < 10K.  - hold anticoagulation when platelets <50k  - Daily CBC while IP.    At risk for malnutrition  - RD/Nutrition consulted per BMT protocol    Hypotension  - likely due to volume depletion, diarrhea and poor po intake  - amlodipine stopped 4/15  - restarted IVF 4/16 with improvement     Elevated BP without diagnosis of hypertension  - BP consistently high normal, not on  antihypertensives at home.   - Monitoring closely  - amlodipine started on 4/10  - PRN hydralazine available.  - Stopped IVF 4/9  - BP normalized, amlodipine stopped 4/15   - now hypotensive 4/16, IVF restarted    Insomnia  - reports minimal relief with PRN Melatonin  - Mirtazapine started 4/16, patient reports worked well  - cluster care at night     Neoplastic (malignant) related fatigue  - PT/OT consulted per BMT protocol    Peripheral neuropathy  - Continue home Lyrica    Hypothyroid  - Continue home synthroid    Hyperlipidemia  - Holding home crestor while inpatient     Obesity (BMI 30.0-34.9)  - Body mass index is 31.77 kg/m². Morbid obesity complicates all aspects of disease management from diagnostic modalities to treatment. Weight loss encouraged and health benefits explained to patient.          VTE Risk Mitigation (From admission, onward)           Ordered     heparin, porcine (PF) 100 unit/mL injection flush 300 Units  As needed (PRN)         04/06/25 2212     heparin (porcine) injection 1,600 Units  As needed (PRN)         04/06/25 2203     IP VTE HIGH RISK PATIENT  Once         04/06/25 1824     Place sequential compression device  Until discontinued         04/06/25 1824                    Disposition: inpatient     Dulce Maria Saul NP  Bone Marrow Transplant  [facility] ([facility])

## 2025-04-17 NOTE — ASSESSMENT & PLAN NOTE
- On daily ASA at home, will hold while inpatient  - Ppx lovenox until platelets <50k, stopped 4/15

## 2025-04-17 NOTE — ASSESSMENT & PLAN NOTE
- BP consistently high normal, not on antihypertensives at home.   - Monitoring closely  - amlodipine started on 4/10  - PRN hydralazine available.  - Stopped IVF 4/9  - BP normalized, amlodipine stopped 4/15   - now hypotensive 4/16, IVF restarted

## 2025-04-17 NOTE — ASSESSMENT & PLAN NOTE
- C-diff neg  - Started Imodium QID, PRN  - changed Imodium to ATC on 4/15 and PRN Lomotil added   - IVF restarted 4/16  - will change Lomotil to ATC today

## 2025-04-17 NOTE — ASSESSMENT & PLAN NOTE
- likely due to volume depletion, diarrhea and poor po intake  - amlodipine stopped 4/15  - restarted IVF 4/16 with improvement

## 2025-04-17 NOTE — ASSESSMENT & PLAN NOTE
- continue ppx levaquin and diflucan   - On ppx Valtrex at home, no reported history of shingles. Transitioned to ppx acyclovir.

## 2025-04-17 NOTE — SUBJECTIVE & OBJECTIVE
Subjective:     Interval History: Day +9 from a Jennifer 200 auto SCT for MM. Nausea controlled. BM x 7, will change lomotil to ATC. BP improved with IVF. Reports slept well with addition of Mirtazapine.     Objective:     Vital Signs (Most Recent):  Temp: 98.4 °F (36.9 °C) (04/17/25 0357)  Pulse: 87 (04/17/25 0357)  Resp: 18 (04/17/25 0357)  BP: (!) 110/58 (04/17/25 0357)  SpO2: 95 % (04/17/25 0357) Vital Signs (24h Range):  Temp:  [98 °F (36.7 °C)-99.5 °F (37.5 °C)] 98.4 °F (36.9 °C)  Pulse:  [82-95] 87  Resp:  [18-20] 18  SpO2:  [95 %-100 %] 95 %  BP: ()/(58-77) 110/58     Weight: 97.6 kg (215 lb 2.7 oz)  Body mass index is 31.77 kg/m².  Body surface area is 2.18 meters squared.      Intake/Output - Last 3 Shifts         04/15 0700  04/16 0659 04/16 0700  04/17 0659 04/17 0700  04/18 0659    P.O. 1400 950     I.V. (mL/kg)  753.7 (7.7)     Total Intake(mL/kg) 1400 (14.5) 1703.7 (17.5)     Urine (mL/kg/hr) 800 (0.3) 500 (0.2)     Emesis/NG output 0      Other       Stool 0      Total Output 800 500     Net +600 +1203.7            Urine Occurrence 6 x 7 x     Stool Occurrence 7 x 7 x     Emesis Occurrence 1 x 0 x              Physical Exam  Constitutional:       Appearance: He is well-developed.   HENT:      Head: Normocephalic and atraumatic.      Mouth/Throat:      Pharynx: No oropharyngeal exudate.   Eyes:      General:         Right eye: No discharge.         Left eye: No discharge.      Conjunctiva/sclera: Conjunctivae normal.      Pupils: Pupils are equal, round, and reactive to light.   Cardiovascular:      Rate and Rhythm: Normal rate and regular rhythm.      Heart sounds: Normal heart sounds. No murmur heard.  Pulmonary:      Effort: Pulmonary effort is normal. No respiratory distress.      Breath sounds: Normal breath sounds. No wheezing or rales.   Abdominal:      General: Bowel sounds are normal. There is no distension.      Palpations: Abdomen is soft.      Tenderness: There is no abdominal  tenderness.   Musculoskeletal:         General: No deformity. Normal range of motion.      Cervical back: Normal range of motion and neck supple.   Skin:     General: Skin is warm and dry.      Findings: No erythema or rash.      Comments: Right chest wall vas cath. Dressing c/d/i. No sign of infection to site.   Neurological:      Mental Status: He is alert and oriented to person, place, and time.   Psychiatric:         Behavior: Behavior normal.         Thought Content: Thought content normal.         Judgment: Judgment normal.            Significant Labs:   CBC:   Recent Labs   Lab 04/16/25  0333 04/17/25  0350   WBC 0.09* 0.07*   HGB 10.1* 9.8*   HCT 30.4* 29.6*   PLT 40* 15*    and CMP:   Recent Labs   Lab 04/16/25  0333 04/17/25  0350    140   K 3.9 3.5    108   CO2 24 26   BUN 9 8   CREATININE 0.6 0.7   CALCIUM 9.1 8.9   ALBUMIN 2.9* 2.7*   BILITOT 0.3 0.3   ALKPHOS 76 75   AST 12 12   ALT 20 19   ANIONGAP 9 6*       Diagnostic Results:  None

## 2025-04-18 LAB
ABO + RH BLD: NORMAL
ABSOLUTE EOSINOPHIL (OHS): 0 K/UL
ABSOLUTE MONOCYTE (OHS): 0.03 K/UL (ref 0.3–1)
ABSOLUTE NEUTROPHIL COUNT (OHS): 0 K/UL (ref 1.8–7.7)
ALBUMIN SERPL BCP-MCNC: 2.4 G/DL (ref 3.5–5.2)
ALP SERPL-CCNC: 71 UNIT/L (ref 40–150)
ALT SERPL W/O P-5'-P-CCNC: 20 UNIT/L (ref 10–44)
ANION GAP (OHS): 5 MMOL/L (ref 8–16)
AST SERPL-CCNC: 14 UNIT/L (ref 11–45)
BASOPHILS # BLD AUTO: 0 K/UL
BASOPHILS NFR BLD AUTO: 0 %
BILIRUB SERPL-MCNC: 0.4 MG/DL (ref 0.1–1)
BILIRUB UR QL STRIP.AUTO: NEGATIVE
BLD PROD TYP BPU: NORMAL
BLOOD UNIT EXPIRATION DATE: NORMAL
BLOOD UNIT TYPE CODE: 6200
BUN SERPL-MCNC: 8 MG/DL (ref 8–23)
CALCIUM SERPL-MCNC: 8.6 MG/DL (ref 8.7–10.5)
CHLORIDE SERPL-SCNC: 109 MMOL/L (ref 95–110)
CLARITY UR: CLEAR
CO2 SERPL-SCNC: 23 MMOL/L (ref 23–29)
COLOR UR AUTO: YELLOW
CREAT SERPL-MCNC: 0.7 MG/DL (ref 0.5–1.4)
CROSSMATCH INTERPRETATION: NORMAL
DISPENSE STATUS: NORMAL
ERYTHROCYTE [DISTWIDTH] IN BLOOD BY AUTOMATED COUNT: 16.7 % (ref 11.5–14.5)
GFR SERPLBLD CREATININE-BSD FMLA CKD-EPI: >60 ML/MIN/1.73/M2
GLUCOSE SERPL-MCNC: 104 MG/DL (ref 70–110)
GLUCOSE UR QL STRIP: NEGATIVE
HCT VFR BLD AUTO: 27.9 % (ref 40–54)
HGB BLD-MCNC: 9.2 GM/DL (ref 14–18)
HGB UR QL STRIP: ABNORMAL
HOLD SPECIMEN: NORMAL
IMM GRANULOCYTES # BLD AUTO: 0 K/UL (ref 0–0.04)
IMM GRANULOCYTES NFR BLD AUTO: 0 % (ref 0–0.5)
KETONES UR QL STRIP: NEGATIVE
LEUKOCYTE ESTERASE UR QL STRIP: NEGATIVE
LYMPHOCYTES # BLD AUTO: 0.07 K/UL (ref 1–4.8)
MAGNESIUM SERPL-MCNC: 1.8 MG/DL (ref 1.6–2.6)
MCH RBC QN AUTO: 30.4 PG (ref 27–31)
MCHC RBC AUTO-ENTMCNC: 33 G/DL (ref 32–36)
MCV RBC AUTO: 92 FL (ref 82–98)
NITRITE UR QL STRIP: NEGATIVE
NUCLEATED RBC (/100WBC) (OHS): 0 /100 WBC
PH UR STRIP: 6 [PH]
PHOSPHATE SERPL-MCNC: 2.7 MG/DL (ref 2.7–4.5)
PLATELET # BLD AUTO: 6 K/UL (ref 150–450)
PLATELET BLD QL SMEAR: ABNORMAL
PMV BLD AUTO: ABNORMAL FL
POTASSIUM SERPL-SCNC: 3.7 MMOL/L (ref 3.5–5.1)
PROT SERPL-MCNC: 5.1 GM/DL (ref 6–8.4)
PROT UR QL STRIP: ABNORMAL
RBC # BLD AUTO: 3.03 M/UL (ref 4.6–6.2)
RELATIVE EOSINOPHIL (OHS): 0 %
RELATIVE LYMPHOCYTE (OHS): 70 % (ref 18–48)
RELATIVE MONOCYTE (OHS): 30 % (ref 4–15)
RELATIVE NEUTROPHIL (OHS): 0 % (ref 38–73)
SODIUM SERPL-SCNC: 137 MMOL/L (ref 136–145)
SP GR UR STRIP: 1.02
UNIT NUMBER: NORMAL
UROBILINOGEN UR STRIP-ACNC: NEGATIVE EU/DL
WBC # BLD AUTO: 0.1 K/UL (ref 3.9–12.7)

## 2025-04-18 PROCEDURE — 85025 COMPLETE CBC W/AUTO DIFF WBC: CPT | Performed by: INTERNAL MEDICINE

## 2025-04-18 PROCEDURE — 99232 SBSQ HOSP IP/OBS MODERATE 35: CPT | Mod: ,,, | Performed by: INTERNAL MEDICINE

## 2025-04-18 PROCEDURE — 20600001 HC STEP DOWN PRIVATE ROOM

## 2025-04-18 PROCEDURE — 25000003 PHARM REV CODE 250: Performed by: INTERNAL MEDICINE

## 2025-04-18 PROCEDURE — 84100 ASSAY OF PHOSPHORUS: CPT | Performed by: NURSE PRACTITIONER

## 2025-04-18 PROCEDURE — 63600175 PHARM REV CODE 636 W HCPCS: Mod: JZ,TB | Performed by: INTERNAL MEDICINE

## 2025-04-18 PROCEDURE — 87040 BLOOD CULTURE FOR BACTERIA: CPT | Performed by: INTERNAL MEDICINE

## 2025-04-18 PROCEDURE — P9037 PLATE PHERES LEUKOREDU IRRAD: HCPCS | Performed by: INTERNAL MEDICINE

## 2025-04-18 PROCEDURE — 25000003 PHARM REV CODE 250: Performed by: NURSE PRACTITIONER

## 2025-04-18 PROCEDURE — 82040 ASSAY OF SERUM ALBUMIN: CPT | Performed by: NURSE PRACTITIONER

## 2025-04-18 PROCEDURE — 83735 ASSAY OF MAGNESIUM: CPT | Performed by: NURSE PRACTITIONER

## 2025-04-18 PROCEDURE — 36415 COLL VENOUS BLD VENIPUNCTURE: CPT | Performed by: INTERNAL MEDICINE

## 2025-04-18 PROCEDURE — 25000003 PHARM REV CODE 250: Performed by: STUDENT IN AN ORGANIZED HEALTH CARE EDUCATION/TRAINING PROGRAM

## 2025-04-18 PROCEDURE — 63600175 PHARM REV CODE 636 W HCPCS: Performed by: NURSE PRACTITIONER

## 2025-04-18 PROCEDURE — 81003 URINALYSIS AUTO W/O SCOPE: CPT | Performed by: INTERNAL MEDICINE

## 2025-04-18 PROCEDURE — 63600175 PHARM REV CODE 636 W HCPCS: Performed by: INTERNAL MEDICINE

## 2025-04-18 RX ORDER — HYDROCODONE BITARTRATE AND ACETAMINOPHEN 500; 5 MG/1; MG/1
TABLET ORAL
Status: DISCONTINUED | OUTPATIENT
Start: 2025-04-18 | End: 2025-04-22 | Stop reason: HOSPADM

## 2025-04-18 RX ORDER — MORPHINE 10 MG/ML
6 TINCTURE ORAL 4 TIMES DAILY PRN
Refills: 0 | Status: DISCONTINUED | OUTPATIENT
Start: 2025-04-18 | End: 2025-04-20

## 2025-04-18 RX ORDER — ACETAMINOPHEN 325 MG/1
650 TABLET ORAL EVERY 6 HOURS PRN
Status: DISCONTINUED | OUTPATIENT
Start: 2025-04-18 | End: 2025-04-22 | Stop reason: HOSPADM

## 2025-04-18 RX ORDER — CEFEPIME HYDROCHLORIDE 2 G/1
2 INJECTION, POWDER, FOR SOLUTION INTRAVENOUS
Status: DISCONTINUED | OUTPATIENT
Start: 2025-04-18 | End: 2025-04-18

## 2025-04-18 RX ORDER — CEFEPIME HYDROCHLORIDE 2 G/1
2 INJECTION, POWDER, FOR SOLUTION INTRAVENOUS
Status: DISCONTINUED | OUTPATIENT
Start: 2025-04-18 | End: 2025-04-21

## 2025-04-18 RX ADMIN — SODIUM CHLORIDE: 9 INJECTION, SOLUTION INTRAVENOUS at 12:04

## 2025-04-18 RX ADMIN — FILGRASTIM 480 MCG: 480 INJECTION, SOLUTION INTRAVENOUS; SUBCUTANEOUS at 09:04

## 2025-04-18 RX ADMIN — DICYCLOMINE HYDROCHLORIDE 10 MG: 10 CAPSULE ORAL at 05:04

## 2025-04-18 RX ADMIN — ACYCLOVIR 800 MG: 200 CAPSULE ORAL at 08:04

## 2025-04-18 RX ADMIN — LOPERAMIDE HYDROCHLORIDE 2 MG: 2 CAPSULE ORAL at 09:04

## 2025-04-18 RX ADMIN — CEFEPIME 2 G: 2 INJECTION, POWDER, FOR SOLUTION INTRAVENOUS at 05:04

## 2025-04-18 RX ADMIN — LEVOTHYROXINE SODIUM 88 MCG: 88 TABLET ORAL at 06:04

## 2025-04-18 RX ADMIN — Medication 1 DOSE: at 08:04

## 2025-04-18 RX ADMIN — DICYCLOMINE HYDROCHLORIDE 10 MG: 10 CAPSULE ORAL at 09:04

## 2025-04-18 RX ADMIN — ONDANSETRON 8 MG: 2 INJECTION INTRAMUSCULAR; INTRAVENOUS at 01:04

## 2025-04-18 RX ADMIN — CEFEPIME 2 G: 2 INJECTION, POWDER, FOR SOLUTION INTRAVENOUS at 09:04

## 2025-04-18 RX ADMIN — DIPHENOXYLATE HYDROCHLORIDE AND ATROPINE SULFATE 1 TABLET: 2.5; .025 TABLET ORAL at 01:04

## 2025-04-18 RX ADMIN — Medication 1 DOSE: at 09:04

## 2025-04-18 RX ADMIN — DULOXETINE HYDROCHLORIDE 20 MG: 20 CAPSULE, DELAYED RELEASE ORAL at 09:04

## 2025-04-18 RX ADMIN — DICYCLOMINE HYDROCHLORIDE 10 MG: 10 CAPSULE ORAL at 01:04

## 2025-04-18 RX ADMIN — PREGABALIN 75 MG: 75 CAPSULE ORAL at 08:04

## 2025-04-18 RX ADMIN — DIPHENOXYLATE HYDROCHLORIDE AND ATROPINE SULFATE 1 TABLET: 2.5; .025 TABLET ORAL at 09:04

## 2025-04-18 RX ADMIN — ONDANSETRON 8 MG: 2 INJECTION INTRAMUSCULAR; INTRAVENOUS at 06:04

## 2025-04-18 RX ADMIN — Medication 1 DOSE: at 01:04

## 2025-04-18 RX ADMIN — Medication 400 MG: at 09:04

## 2025-04-18 RX ADMIN — Medication 400 MG: at 06:04

## 2025-04-18 RX ADMIN — MORPHINE 6 MG: 10 TINCTURE ORAL at 05:04

## 2025-04-18 RX ADMIN — DIPHENOXYLATE HYDROCHLORIDE AND ATROPINE SULFATE 1 TABLET: 2.5; .025 TABLET ORAL at 08:04

## 2025-04-18 RX ADMIN — ACETAMINOPHEN 650 MG: 325 TABLET ORAL at 12:04

## 2025-04-18 RX ADMIN — DIPHENHYDRAMINE HYDROCHLORIDE 25 MG: 25 CAPSULE ORAL at 12:04

## 2025-04-18 RX ADMIN — ACETAMINOPHEN 650 MG: 325 TABLET ORAL at 09:04

## 2025-04-18 RX ADMIN — FLUCONAZOLE 400 MG: 200 TABLET ORAL at 09:04

## 2025-04-18 RX ADMIN — LOPERAMIDE HYDROCHLORIDE 2 MG: 2 CAPSULE ORAL at 05:04

## 2025-04-18 RX ADMIN — DIPHENOXYLATE HYDROCHLORIDE AND ATROPINE SULFATE 1 TABLET: 2.5; .025 TABLET ORAL at 05:04

## 2025-04-18 RX ADMIN — ONDANSETRON 8 MG: 2 INJECTION INTRAMUSCULAR; INTRAVENOUS at 09:04

## 2025-04-18 RX ADMIN — LOPERAMIDE HYDROCHLORIDE 2 MG: 2 CAPSULE ORAL at 01:04

## 2025-04-18 RX ADMIN — DICYCLOMINE HYDROCHLORIDE 10 MG: 10 CAPSULE ORAL at 08:04

## 2025-04-18 RX ADMIN — PANTOPRAZOLE SODIUM 40 MG: 40 TABLET, DELAYED RELEASE ORAL at 09:04

## 2025-04-18 RX ADMIN — ACYCLOVIR 800 MG: 200 CAPSULE ORAL at 09:04

## 2025-04-18 RX ADMIN — Medication 1 DOSE: at 05:04

## 2025-04-18 RX ADMIN — LOPERAMIDE HYDROCHLORIDE 2 MG: 2 CAPSULE ORAL at 08:04

## 2025-04-18 RX ADMIN — POTASSIUM CHLORIDE 20 MEQ: 1500 TABLET, EXTENDED RELEASE ORAL at 06:04

## 2025-04-18 NOTE — SUBJECTIVE & OBJECTIVE
Subjective:     Interval History: Day +10 from a Jennifer 200 auto SCT for MM. Nausea fairly controlled but has had multipel bowel movmements (+7) despite imodium and lomotil scheduled. Adding TOO. Appetite is still off.    Objective:     Vital Signs (Most Recent):  Temp: 98.4 °F (36.9 °C) (04/18/25 1330)  Pulse: 89 (04/18/25 1330)  Resp: 18 (04/18/25 1330)  BP: (!) 92/55 (04/18/25 1330)  SpO2: 97 % (04/18/25 1330) Vital Signs (24h Range):  Temp:  [97.9 °F (36.6 °C)-101.6 °F (38.7 °C)] 98.4 °F (36.9 °C)  Pulse:  [87-98] 89  Resp:  [18-20] 18  SpO2:  [93 %-98 %] 97 %  BP: ()/(43-69) 92/55     Weight: 98.1 kg (216 lb 6.1 oz)  Body mass index is 31.95 kg/m².  Body surface area is 2.19 meters squared.    ECOG SCORE           [unfilled]    Intake/Output - Last 3 Shifts         04/16 0700  04/17 0659 04/17 0700  04/18 0659 04/18 0700  04/19 0659    P.O. 950 1030 318    I.V. (mL/kg) 753.7 (7.7) 2652.4 (27)     Total Intake(mL/kg) 1703.7 (17.5) 3682.4 (37.5) 318 (3.2)    Urine (mL/kg/hr) 500 (0.2) 1002 (0.4)     Emesis/NG output       Stool  0     Total Output 500 1002     Net +1203.7 +2680.4 +318           Urine Occurrence 7 x 4 x 3 x    Stool Occurrence 7 x 8 x 3 x    Emesis Occurrence 0 x               Physical Exam  Constitutional:       Appearance: He is well-developed.   HENT:      Head: Normocephalic and atraumatic.      Mouth/Throat:      Pharynx: No oropharyngeal exudate.   Eyes:      General:         Right eye: No discharge.         Left eye: No discharge.      Conjunctiva/sclera: Conjunctivae normal.      Pupils: Pupils are equal, round, and reactive to light.   Cardiovascular:      Rate and Rhythm: Normal rate and regular rhythm.      Heart sounds: Normal heart sounds. No murmur heard.  Pulmonary:      Effort: Pulmonary effort is normal. No respiratory distress.      Breath sounds: Normal breath sounds. No wheezing or rales.   Abdominal:      General: Bowel sounds are normal. There is no distension.       Palpations: Abdomen is soft.      Tenderness: There is no abdominal tenderness.   Musculoskeletal:         General: No deformity. Normal range of motion.      Cervical back: Normal range of motion and neck supple.   Skin:     General: Skin is warm and dry.      Findings: No erythema or rash.      Comments: Right chest wall vas cath. Dressing c/d/i. No sign of infection to site.   Neurological:      Mental Status: He is alert and oriented to person, place, and time.   Psychiatric:         Behavior: Behavior normal.         Thought Content: Thought content normal.         Judgment: Judgment normal.            Significant Labs:   All pertinent labs from the last 24 hours have been reviewed.    Diagnostic Results:  I have reviewed all pertinent imaging results/findings within the past 24 hours.

## 2025-04-18 NOTE — PROGRESS NOTES
[facility] - Oncology ([facility])  Hematology  Bone Marrow Transplant  Progress Note    Patient Name: Mitchel Joya  Admission Date: 4/6/2025  Hospital Length of Stay: 12 days  Code Status: Full Code    Subjective:     Interval History: Day +10 from a Jennifer 200 auto SCT for MM. Nausea fairly controlled but has had multipel bowel movmements (+7) despite imodium and lomotil scheduled. Adding TOO. Appetite is still off.    Objective:     Vital Signs (Most Recent):  Temp: 98.4 °F (36.9 °C) (04/18/25 1330)  Pulse: 89 (04/18/25 1330)  Resp: 18 (04/18/25 1330)  BP: (!) 92/55 (04/18/25 1330)  SpO2: 97 % (04/18/25 1330) Vital Signs (24h Range):  Temp:  [97.9 °F (36.6 °C)-101.6 °F (38.7 °C)] 98.4 °F (36.9 °C)  Pulse:  [87-98] 89  Resp:  [18-20] 18  SpO2:  [93 %-98 %] 97 %  BP: ()/(43-69) 92/55     Weight: 98.1 kg (216 lb 6.1 oz)  Body mass index is 31.95 kg/m².  Body surface area is 2.19 meters squared.    ECOG SCORE           [unfilled]    Intake/Output - Last 3 Shifts         04/16 0700  04/17 0659 04/17 0700  04/18 0659 04/18 0700  04/19 0659    P.O. 950 1030 318    I.V. (mL/kg) 753.7 (7.7) 2652.4 (27)     Total Intake(mL/kg) 1703.7 (17.5) 3682.4 (37.5) 318 (3.2)    Urine (mL/kg/hr) 500 (0.2) 1002 (0.4)     Emesis/NG output       Stool  0     Total Output 500 1002     Net +1203.7 +2680.4 +318           Urine Occurrence 7 x 4 x 3 x    Stool Occurrence 7 x 8 x 3 x    Emesis Occurrence 0 x               Physical Exam  Constitutional:       Appearance: He is well-developed.   HENT:      Head: Normocephalic and atraumatic.      Mouth/Throat:      Pharynx: No oropharyngeal exudate.   Eyes:      General:         Right eye: No discharge.         Left eye: No discharge.      Conjunctiva/sclera: Conjunctivae normal.      Pupils: Pupils are equal, round, and reactive to light.   Cardiovascular:      Rate and Rhythm: Normal rate and regular rhythm.      Heart sounds: Normal heart sounds. No murmur heard.  Pulmonary:      Effort:  Pulmonary effort is normal. No respiratory distress.      Breath sounds: Normal breath sounds. No wheezing or rales.   Abdominal:      General: Bowel sounds are normal. There is no distension.      Palpations: Abdomen is soft.      Tenderness: There is no abdominal tenderness.   Musculoskeletal:         General: No deformity. Normal range of motion.      Cervical back: Normal range of motion and neck supple.   Skin:     General: Skin is warm and dry.      Findings: No erythema or rash.      Comments: Right chest wall vas cath. Dressing c/d/i. No sign of infection to site.   Neurological:      Mental Status: He is alert and oriented to person, place, and time.   Psychiatric:         Behavior: Behavior normal.         Thought Content: Thought content normal.         Judgment: Judgment normal.            Significant Labs:   All pertinent labs from the last 24 hours have been reviewed.    Diagnostic Results:  I have reviewed all pertinent imaging results/findings within the past 24 hours.  Assessment/Plan:     * History of autologous stem cell transplant  Patient of Dr. Hemphill  - Coordinator: Xochilt Diaz  - Conditioning Regimen: Melphalan 200 mg/m2  - Cell Dose: 5-10 x 10^6 CD34/kg  - Maintenance: Korina+Rev  - Caregiver: Partner   - Housing: [facility]   - Admitted 4/6/25 for Jennifer 200 Auto. Today is Day +10.  - Stem cell infusion completed  4/08/25 at 11:30. Received 5 bags with a total CD34 dose of 4.39 x10^6/kg, tolerated without issue    Planned conditioning regimen:  Melphalan 200 on Day -1     Antimicrobial Prophylaxis:  Acyclovir starting on Day -1  Levofloxacin starting on Day -1  Fluconazole starting on Day -1  Bactrim starting on Day +30     Growth Factor Support:  Neupogen starting on Day +7     Insomnia  - reports minimal relief with PRN Melatonin  - Mirtazapine started 4/16, patient reports worked well  - cluster care at night     Hypotension  - likely due to volume depletion, diarrhea and poor po  intake  - amlodipine stopped 4/15  - restarted IVF 4/16 with improvement     Chemotherapy-induced nausea  - Zofran to ATC on 4/15  - has PRN Compazine available  Controlled on current regimen    Chemotherapy-induced diarrhea  - C-diff neg  - Started Imodium QID, PRN  - changed Imodium to ATC on 4/15 and PRN Lomotil added   - IVF restarted 4/16  - will change Lomotil to ATC   - will add tincture of opioum PRN      Thrombocytopenia  - Anticipate pancytopenia following chemotherapy.  - Transfuse for plts < 10K.  - hold anticoagulation when platelets <50k  - Daily CBC while IP.    Pancytopenia due to chemotherapy  - Anticipated following chemotherapy.  - Transfuse for hgb < 7 or plts < 10K.  - Daily CBC while IP.  - Continuing antimicrobial ppx.    Immunocompromised state associated with stem cell transplant  - See Multiple myeloma not having achieved remission   - See Autologous stem cell transplant     Neoplastic (malignant) related fatigue  - PT/OT consulted per BMT protocol    At risk for malnutrition  - RD/Nutrition consulted per BMT protocol    Elevated BP without diagnosis of hypertension  - BP consistently high normal, not on antihypertensives at home.   - Monitoring closely  - amlodipine started on 4/10  - PRN hydralazine available.  - Stopped IVF 4/9  - BP normalized, amlodipine stopped 4/15   - now hypotensive 4/16, IVF restarted    Immunosuppressed due to chemotherapy  - continue ppx levaquin and diflucan   - On ppx Valtrex at home, no reported history of shingles. Transitioned to ppx acyclovir.     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  SPEP with 0.16 g/dL and 0.12 g/dL. FLCs normal.   PET/CT (3/7/25) - no new hypermetabolic lesions.    See Autologous Stem cell transplant     Obesity (BMI 30.0-34.9)  - Body mass index is 31.77 kg/m². Morbid obesity complicates all aspects of disease management from diagnostic modalities to treatment. Weight loss encouraged and health benefits explained to patient.          VTE Risk Mitigation (From admission, onward)           Ordered     heparin, porcine (PF) 100 unit/mL injection flush 300 Units  As needed (PRN)         04/06/25 2212     heparin (porcine) injection 1,600 Units  As needed (PRN)         04/06/25 2203     IP VTE HIGH RISK PATIENT  Once         04/06/25 1824     Place sequential compression device  Until discontinued         04/06/25 1824                    Disposition: tbd    Mirza Rosales MD  Bone Marrow Transplant  [facility] - Oncology ([facility])

## 2025-04-18 NOTE — ASSESSMENT & PLAN NOTE
- C-diff neg  - Started Imodium QID, PRN  - changed Imodium to ATC on 4/15 and PRN Lomotil added   - IVF restarted 4/16  - will change Lomotil to ATC   - will add tincture of opioum PRN

## 2025-04-18 NOTE — PROGRESS NOTES
Pharmacist Renal Dose Adjustment Note    Mitchel Joya is a 65 y.o. male being treated with the medication Cefepime    Patient Data:    Vital Signs (Most Recent):  Temp: 99.1 °F (37.3 °C) (04/18/25 0437)  Pulse: 96 (04/18/25 0437)  Resp: 20 (04/18/25 0437)  BP: 104/68 (04/18/25 0437)  SpO2: (!) 93 % (04/18/25 0437) Vital Signs (72h Range):  Temp:  [97.7 °F (36.5 °C)-101.6 °F (38.7 °C)]   Pulse:  [82-98]   Resp:  [17-20]   BP: ()/(57-85)   SpO2:  [93 %-100 %]      Recent Labs   Lab 04/16/25  0333 04/17/25  0350 04/18/25  0444   CREATININE 0.6 0.7 0.7     Serum creatinine: 0.7 mg/dL 04/18/25 0444  Estimated creatinine clearance: 120 mL/min    Medication:Cefepime was renally adjusted from 2g q12h to 2g q8h per pharmacy renal adjustment protocol.       Janessa Araujo, PharmD  Clinical Pharmacist-BMT/Hematology  [facility]

## 2025-04-18 NOTE — PLAN OF CARE
- Day +10 Jennifer Auto SCT for MM.   - Tmax 101.6, not sustained, recheck in 1 hour temp 99.7.Septic workup done, abx cefepime started.  - Electrolytes replaced PRN as per protocol.     Went over POC with pt at beginning of shift.  Questions were asked and answered.  AAO x 4.  VSS.  Up to toilet independently. Voiding without difficulty. No complaints of pain.  Pt remained free from injury with bed in low locked position, call light with in reach, non-skid socks, and frequent rounding.  Pt instructed to call for assistance as needed. Wife at bedside. Denies needs at this time.

## 2025-04-18 NOTE — NURSING
Patient received 1 unit of platelets at this time, premeds of tylenol 650mg po and benadryl 25mg po given prior to platelets .  No complaints voiced.

## 2025-04-18 NOTE — ASSESSMENT & PLAN NOTE
Patient of Dr. Hemphill  - Coordinator: Xochilt Diaz  - Conditioning Regimen: Melphalan 200 mg/m2  - Cell Dose: 5-10 x 10^6 CD34/kg  - Maintenance: Korina+Rev  - Caregiver: Partner   - [facility]   - Admitted 4/6/25 for Jennifer 200 Auto. Today is Day +10.  - Stem cell infusion completed  4/08/25 at 11:30. Received 5 bags with a total CD34 dose of 4.39 x10^6/kg, tolerated without issue    Planned conditioning regimen:  Melphalan 200 on Day -1     Antimicrobial Prophylaxis:  Acyclovir starting on Day -1  Levofloxacin starting on Day -1  Fluconazole starting on Day -1  Bactrim starting on Day +30     Growth Factor Support:  Neupogen starting on Day +7

## 2025-04-19 LAB
ABSOLUTE EOSINOPHIL (OHS): 0 K/UL
ABSOLUTE MONOCYTE (OHS): 0.05 K/UL (ref 0.3–1)
ABSOLUTE NEUTROPHIL COUNT (OHS): 0.02 K/UL (ref 1.8–7.7)
ALBUMIN SERPL BCP-MCNC: 2.4 G/DL (ref 3.5–5.2)
ALP SERPL-CCNC: 68 UNIT/L (ref 40–150)
ALT SERPL W/O P-5'-P-CCNC: 23 UNIT/L (ref 10–44)
ANION GAP (OHS): 9 MMOL/L (ref 8–16)
AST SERPL-CCNC: 14 UNIT/L (ref 11–45)
BASOPHILS # BLD AUTO: 0 K/UL
BASOPHILS NFR BLD AUTO: 0 %
BILIRUB SERPL-MCNC: 0.3 MG/DL (ref 0.1–1)
BUN SERPL-MCNC: 7 MG/DL (ref 8–23)
CALCIUM SERPL-MCNC: 8.4 MG/DL (ref 8.7–10.5)
CHLORIDE SERPL-SCNC: 109 MMOL/L (ref 95–110)
CO2 SERPL-SCNC: 22 MMOL/L (ref 23–29)
CREAT SERPL-MCNC: 0.7 MG/DL (ref 0.5–1.4)
ERYTHROCYTE [DISTWIDTH] IN BLOOD BY AUTOMATED COUNT: 16.6 % (ref 11.5–14.5)
GFR SERPLBLD CREATININE-BSD FMLA CKD-EPI: >60 ML/MIN/1.73/M2
GLUCOSE SERPL-MCNC: 93 MG/DL (ref 70–110)
HCT VFR BLD AUTO: 24.7 % (ref 40–54)
HGB BLD-MCNC: 8.3 GM/DL (ref 14–18)
IMM GRANULOCYTES # BLD AUTO: 0 K/UL (ref 0–0.04)
IMM GRANULOCYTES NFR BLD AUTO: 0 % (ref 0–0.5)
INDIRECT COOMBS: ABNORMAL
LYMPHOCYTES # BLD AUTO: 0.12 K/UL (ref 1–4.8)
MAGNESIUM SERPL-MCNC: 1.8 MG/DL (ref 1.6–2.6)
MCH RBC QN AUTO: 30.5 PG (ref 27–31)
MCHC RBC AUTO-ENTMCNC: 33.6 G/DL (ref 32–36)
MCV RBC AUTO: 91 FL (ref 82–98)
NUCLEATED RBC (/100WBC) (OHS): 0 /100 WBC
PHOSPHATE SERPL-MCNC: 2.7 MG/DL (ref 2.7–4.5)
PLATELET # BLD AUTO: 18 K/UL (ref 150–450)
PLATELET BLD QL SMEAR: ABNORMAL
PMV BLD AUTO: 11.2 FL (ref 9.2–12.9)
POTASSIUM SERPL-SCNC: 3.4 MMOL/L (ref 3.5–5.1)
PROT SERPL-MCNC: 4.9 GM/DL (ref 6–8.4)
RBC # BLD AUTO: 2.72 M/UL (ref 4.6–6.2)
RELATIVE EOSINOPHIL (OHS): 0 %
RELATIVE LYMPHOCYTE (OHS): 63.2 % (ref 18–48)
RELATIVE MONOCYTE (OHS): 26.3 % (ref 4–15)
RELATIVE NEUTROPHIL (OHS): 10.5 % (ref 38–73)
RH BLD: ABNORMAL
SODIUM SERPL-SCNC: 140 MMOL/L (ref 136–145)
SPECIMEN OUTDATE: ABNORMAL
WBC # BLD AUTO: 0.19 K/UL (ref 3.9–12.7)

## 2025-04-19 PROCEDURE — 63600175 PHARM REV CODE 636 W HCPCS: Performed by: NURSE PRACTITIONER

## 2025-04-19 PROCEDURE — 25000003 PHARM REV CODE 250: Performed by: NURSE PRACTITIONER

## 2025-04-19 PROCEDURE — 25000003 PHARM REV CODE 250

## 2025-04-19 PROCEDURE — 25000003 PHARM REV CODE 250: Performed by: INTERNAL MEDICINE

## 2025-04-19 PROCEDURE — 83735 ASSAY OF MAGNESIUM: CPT | Performed by: NURSE PRACTITIONER

## 2025-04-19 PROCEDURE — 99232 SBSQ HOSP IP/OBS MODERATE 35: CPT | Mod: ,,, | Performed by: INTERNAL MEDICINE

## 2025-04-19 PROCEDURE — 20600001 HC STEP DOWN PRIVATE ROOM

## 2025-04-19 PROCEDURE — 80053 COMPREHEN METABOLIC PANEL: CPT | Performed by: NURSE PRACTITIONER

## 2025-04-19 PROCEDURE — 85025 COMPLETE CBC W/AUTO DIFF WBC: CPT | Performed by: INTERNAL MEDICINE

## 2025-04-19 PROCEDURE — 86850 RBC ANTIBODY SCREEN: CPT | Performed by: INTERNAL MEDICINE

## 2025-04-19 PROCEDURE — 63600175 PHARM REV CODE 636 W HCPCS: Performed by: INTERNAL MEDICINE

## 2025-04-19 PROCEDURE — 84100 ASSAY OF PHOSPHORUS: CPT | Performed by: NURSE PRACTITIONER

## 2025-04-19 PROCEDURE — 63600175 PHARM REV CODE 636 W HCPCS: Mod: JZ,TB | Performed by: INTERNAL MEDICINE

## 2025-04-19 RX ADMIN — Medication 400 MG: at 10:04

## 2025-04-19 RX ADMIN — CEFEPIME 2 G: 2 INJECTION, POWDER, FOR SOLUTION INTRAVENOUS at 09:04

## 2025-04-19 RX ADMIN — CEFEPIME 2 G: 2 INJECTION, POWDER, FOR SOLUTION INTRAVENOUS at 01:04

## 2025-04-19 RX ADMIN — Medication 1 DOSE: at 05:04

## 2025-04-19 RX ADMIN — DICYCLOMINE HYDROCHLORIDE 10 MG: 10 CAPSULE ORAL at 08:04

## 2025-04-19 RX ADMIN — ONDANSETRON 8 MG: 2 INJECTION INTRAMUSCULAR; INTRAVENOUS at 05:04

## 2025-04-19 RX ADMIN — DICYCLOMINE HYDROCHLORIDE 10 MG: 10 CAPSULE ORAL at 05:04

## 2025-04-19 RX ADMIN — ACYCLOVIR 800 MG: 200 CAPSULE ORAL at 08:04

## 2025-04-19 RX ADMIN — Medication 400 MG: at 05:04

## 2025-04-19 RX ADMIN — SODIUM CHLORIDE: 9 INJECTION, SOLUTION INTRAVENOUS at 02:04

## 2025-04-19 RX ADMIN — CEFEPIME 2 G: 2 INJECTION, POWDER, FOR SOLUTION INTRAVENOUS at 05:04

## 2025-04-19 RX ADMIN — DICYCLOMINE HYDROCHLORIDE 10 MG: 10 CAPSULE ORAL at 02:04

## 2025-04-19 RX ADMIN — DIPHENOXYLATE HYDROCHLORIDE AND ATROPINE SULFATE 1 TABLET: 2.5; .025 TABLET ORAL at 05:04

## 2025-04-19 RX ADMIN — LEVOTHYROXINE SODIUM 88 MCG: 88 TABLET ORAL at 05:04

## 2025-04-19 RX ADMIN — FILGRASTIM 480 MCG: 480 INJECTION, SOLUTION INTRAVENOUS; SUBCUTANEOUS at 09:04

## 2025-04-19 RX ADMIN — Medication 1 DOSE: at 02:04

## 2025-04-19 RX ADMIN — DIPHENOXYLATE HYDROCHLORIDE AND ATROPINE SULFATE 1 TABLET: 2.5; .025 TABLET ORAL at 08:04

## 2025-04-19 RX ADMIN — POTASSIUM CHLORIDE 20 MEQ: 1500 TABLET, EXTENDED RELEASE ORAL at 08:04

## 2025-04-19 RX ADMIN — LOPERAMIDE HYDROCHLORIDE 2 MG: 2 CAPSULE ORAL at 08:04

## 2025-04-19 RX ADMIN — PREGABALIN 75 MG: 75 CAPSULE ORAL at 08:04

## 2025-04-19 RX ADMIN — POTASSIUM CHLORIDE 20 MEQ: 1500 TABLET, EXTENDED RELEASE ORAL at 05:04

## 2025-04-19 RX ADMIN — PANTOPRAZOLE SODIUM 40 MG: 40 TABLET, DELAYED RELEASE ORAL at 08:04

## 2025-04-19 RX ADMIN — Medication 1 DOSE: at 08:04

## 2025-04-19 RX ADMIN — LOPERAMIDE HYDROCHLORIDE 2 MG: 2 CAPSULE ORAL at 02:04

## 2025-04-19 RX ADMIN — FLUCONAZOLE 400 MG: 200 TABLET ORAL at 08:04

## 2025-04-19 RX ADMIN — DIPHENOXYLATE HYDROCHLORIDE AND ATROPINE SULFATE 1 TABLET: 2.5; .025 TABLET ORAL at 02:04

## 2025-04-19 RX ADMIN — ONDANSETRON 8 MG: 2 INJECTION INTRAMUSCULAR; INTRAVENOUS at 09:04

## 2025-04-19 RX ADMIN — DULOXETINE HYDROCHLORIDE 20 MG: 20 CAPSULE, DELAYED RELEASE ORAL at 08:04

## 2025-04-19 RX ADMIN — ALUMINUM HYDROXIDE, MAGNESIUM HYDROXIDE, AND DIMETHICONE 10 ML: 400; 400; 40 SUSPENSION ORAL at 08:04

## 2025-04-19 RX ADMIN — ONDANSETRON 8 MG: 2 INJECTION INTRAMUSCULAR; INTRAVENOUS at 02:04

## 2025-04-19 RX ADMIN — LOPERAMIDE HYDROCHLORIDE 2 MG: 2 CAPSULE ORAL at 05:04

## 2025-04-19 NOTE — PLAN OF CARE
- Day +11 Jennifer Auto SCT for MM.   - Afebrile.  - watery  BM *7, scheduled imodium and lomotil given.  - Electrolytes replaced PRN as per protocol.   - Pt complains throat sore, hard to swallow, notified Siddharth Sims's ordered.     Went over POC with pt at beginning of shift.  Questions were asked and answered.  AAO x 4.  VSS.  Up to toilet independently. Voiding without difficulty. No complaints of pain.  Pt remained free from injury with bed in low locked position, call light with in reach, non-skid socks, and frequent rounding.  Pt instructed to call for assistance as needed. Wife at bedside. Denies needs at this time.

## 2025-04-19 NOTE — PROGRESS NOTES
04/18/25 2045   Vital Signs   Temp (!) 102.3 °F (39.1 °C)   Temp Source Oral     Notified  of pts temp. No new orders at this time. Will give Tylenol and recheck temp in an hour. Will cont to lou pt.

## 2025-04-19 NOTE — ASSESSMENT & PLAN NOTE
Patient of Dr. Hemphill  - Coordinator: Xochilt Diaz  - Conditioning Regimen: Melphalan 200 mg/m2  - Cell Dose: 5-10 x 10^6 CD34/kg  - Maintenance: Korina+Rev  - Caregiver: Partner   - Housing: [facility]   - Admitted 4/6/25 for Jennifer 200 Auto. Today is Day +11  - Stem cell infusion completed  4/08/25 at 11:30. Received 5 bags with a total CD34 dose of 4.39 x10^6/kg, tolerated without issue    Planned conditioning regimen:  Melphalan 200 on Day -1     Antimicrobial Prophylaxis:  Acyclovir starting on Day -1  Levofloxacin starting on Day -1  Fluconazole starting on Day -1  Bactrim starting on Day +30     Growth Factor Support:  Neupogen starting on Day +7

## 2025-04-19 NOTE — PROGRESS NOTES
[facility] - Oncology ([facility])  Hematology  Bone Marrow Transplant  Progress Note    Patient Name: Mitchel Joya  Admission Date: 4/6/2025  Hospital Length of Stay: 13 days  Code Status: Full Code    Subjective:     Interval History: Day +11 from a Jennifer 200 auto SCT for MM. Ongoing diarrhea and supporting with loperamide, lomotil, and TOO prn. ANC 20 today.    Objective:     Vital Signs (Most Recent):  Temp: 98.6 °F (37 °C) (04/19/25 1034)  Pulse: 79 (04/19/25 1034)  Resp: 18 (04/19/25 1034)  BP: 109/72 (04/19/25 1034)  SpO2: 98 % (04/19/25 1034) Vital Signs (24h Range):  Temp:  [98.2 °F (36.8 °C)-102.3 °F (39.1 °C)] 98.6 °F (37 °C)  Pulse:  [79-98] 79  Resp:  [18-20] 18  SpO2:  [94 %-98 %] 98 %  BP: ()/(52-73) 109/72     Weight: 98.4 kg (216 lb 13.2 oz)  Body mass index is 32.02 kg/m².  Body surface area is 2.19 meters squared.    ECOG SCORE           [unfilled]    Intake/Output - Last 3 Shifts         04/17 0700  04/18 0659 04/18 0700  04/19 0659 04/19 0700  04/20 0659    P.O. 1030 908 500    I.V. (mL/kg) 2652.4 (27) 1216.9 (12.4)     Total Intake(mL/kg) 3682.4 (37.5) 2124.9 (21.6) 500 (5.1)    Urine (mL/kg/hr) 1002 (0.4)      Stool 0      Total Output 1002      Net +2680.4 +2124.9 +500           Urine Occurrence 4 x 9 x 3 x    Stool Occurrence 8 x 8 x 3 x             Physical Exam  Constitutional:       Appearance: He is well-developed.   HENT:      Head: Normocephalic and atraumatic.      Mouth/Throat:      Pharynx: No oropharyngeal exudate.   Eyes:      General:         Right eye: No discharge.         Left eye: No discharge.      Conjunctiva/sclera: Conjunctivae normal.      Pupils: Pupils are equal, round, and reactive to light.   Cardiovascular:      Rate and Rhythm: Normal rate and regular rhythm.      Heart sounds: Normal heart sounds. No murmur heard.  Pulmonary:      Effort: Pulmonary effort is normal. No respiratory distress.      Breath sounds: Normal breath sounds. No wheezing or rales.    Abdominal:      General: Bowel sounds are normal. There is no distension.      Palpations: Abdomen is soft.      Tenderness: There is no abdominal tenderness.   Musculoskeletal:         General: No deformity. Normal range of motion.      Cervical back: Normal range of motion and neck supple.   Skin:     General: Skin is warm and dry.      Findings: No erythema or rash.      Comments: Right chest wall vas cath. Dressing c/d/i. No sign of infection to site.   Neurological:      Mental Status: He is alert and oriented to person, place, and time.   Psychiatric:         Behavior: Behavior normal.         Thought Content: Thought content normal.         Judgment: Judgment normal.            Significant Labs:   All pertinent labs from the last 24 hours have been reviewed.    Diagnostic Results:  I have reviewed all pertinent imaging results/findings within the past 24 hours.  Assessment/Plan:     * History of autologous stem cell transplant  Patient of Dr. Hemphill  - Coordinator: Xochilt Diaz  - Conditioning Regimen: Melphalan 200 mg/m2  - Cell Dose: 5-10 x 10^6 CD34/kg  - Maintenance: Korina+Rev  - Caregiver: Partner   - Housing[facility]   - Admitted 4/6/25 for Jennifer 200 Auto. Today is Day +11  - Stem cell infusion completed  4/08/25 at 11:30. Received 5 bags with a total CD34 dose of 4.39 x10^6/kg, tolerated without issue    Planned conditioning regimen:  Melphalan 200 on Day -1     Antimicrobial Prophylaxis:  Acyclovir starting on Day -1  Levofloxacin starting on Day -1  Fluconazole starting on Day -1  Bactrim starting on Day +30     Growth Factor Support:  Neupogen starting on Day +7     Insomnia  - reports minimal relief with PRN Melatonin  - Mirtazapine started 4/16, patient reports worked well  - cluster care at night     Hypotension  - likely due to volume depletion, diarrhea and poor po intake  - amlodipine stopped 4/15  - restarted IVF 4/16 with improvement     Chemotherapy-induced nausea  - Zofran to ATC on  4/15  - has PRN Compazine available  Controlled on current regimen    Chemotherapy-induced diarrhea  - C-diff neg  - Started Imodium QID, PRN  - changed Imodium to ATC on 4/15 and PRN Lomotil added   - IVF restarted 4/16  - will change Lomotil to ATC   - will add tincture of opioum PRN      Thrombocytopenia  - Anticipate pancytopenia following chemotherapy.  - Transfuse for plts < 10K.  - hold anticoagulation when platelets <50k  - Daily CBC while IP.    Pancytopenia due to chemotherapy  - Anticipated following chemotherapy.  - Transfuse for hgb < 7 or plts < 10K.  - Daily CBC while IP.  - Continuing antimicrobial ppx.    Immunocompromised state associated with stem cell transplant  - See Multiple myeloma not having achieved remission   - See Autologous stem cell transplant     Neoplastic (malignant) related fatigue  - PT/OT consulted per BMT protocol    At risk for malnutrition  - RD/Nutrition consulted per BMT protocol    Elevated BP without diagnosis of hypertension  - BP consistently high normal, not on antihypertensives at home.   - Monitoring closely  - amlodipine started on 4/10  - PRN hydralazine available.  - Stopped IVF 4/9  - BP normalized, amlodipine stopped 4/15   - now hypotensive 4/16, IVF restarted    Immunosuppressed due to chemotherapy  - continue ppx levaquin and diflucan   - On ppx Valtrex at home, no reported history of shingles. Transitioned to ppx acyclovir.     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  SPEP with 0.16 g/dL and 0.12 g/dL. FLCs normal.   PET/CT (3/7/25) - no new hypermetabolic lesions.    See Autologous Stem cell transplant     Obesity (BMI 30.0-34.9)  - Body mass index is 32.02 kg/m². Morbid obesity complicates all aspects of disease management from diagnostic modalities to treatment. Weight loss encouraged and health benefits explained to patient.          VTE Risk Mitigation (From admission, onward)           Ordered     heparin, porcine (PF) 100 unit/mL injection flush 300 Units  As needed (PRN)         04/06/25 2212     heparin (porcine) injection 1,600 Units  As needed (PRN)         04/06/25 2203     IP VTE HIGH RISK PATIENT  Once         04/06/25 1824     Place sequential compression device  Until discontinued         04/06/25 1824                    Disposition: tbd    Mirza Rosales MD  Bone Marrow Transplant  [facility] - Oncology ([facility])

## 2025-04-19 NOTE — SUBJECTIVE & OBJECTIVE
Subjective:     Interval History: Day +11 from a Jennifer 200 auto SCT for MM. Ongoing diarrhea and supporting with loperamide, lomotil, and TOO prn. ANC 20 today.    Objective:     Vital Signs (Most Recent):  Temp: 98.6 °F (37 °C) (04/19/25 1034)  Pulse: 79 (04/19/25 1034)  Resp: 18 (04/19/25 1034)  BP: 109/72 (04/19/25 1034)  SpO2: 98 % (04/19/25 1034) Vital Signs (24h Range):  Temp:  [98.2 °F (36.8 °C)-102.3 °F (39.1 °C)] 98.6 °F (37 °C)  Pulse:  [79-98] 79  Resp:  [18-20] 18  SpO2:  [94 %-98 %] 98 %  BP: ()/(52-73) 109/72     Weight: 98.4 kg (216 lb 13.2 oz)  Body mass index is 32.02 kg/m².  Body surface area is 2.19 meters squared.    ECOG SCORE           [unfilled]    Intake/Output - Last 3 Shifts         04/17 0700  04/18 0659 04/18 0700  04/19 0659 04/19 0700  04/20 0659    P.O. 1030 908 500    I.V. (mL/kg) 2652.4 (27) 1216.9 (12.4)     Total Intake(mL/kg) 3682.4 (37.5) 2124.9 (21.6) 500 (5.1)    Urine (mL/kg/hr) 1002 (0.4)      Stool 0      Total Output 1002      Net +2680.4 +2124.9 +500           Urine Occurrence 4 x 9 x 3 x    Stool Occurrence 8 x 8 x 3 x             Physical Exam  Constitutional:       Appearance: He is well-developed.   HENT:      Head: Normocephalic and atraumatic.      Mouth/Throat:      Pharynx: No oropharyngeal exudate.   Eyes:      General:         Right eye: No discharge.         Left eye: No discharge.      Conjunctiva/sclera: Conjunctivae normal.      Pupils: Pupils are equal, round, and reactive to light.   Cardiovascular:      Rate and Rhythm: Normal rate and regular rhythm.      Heart sounds: Normal heart sounds. No murmur heard.  Pulmonary:      Effort: Pulmonary effort is normal. No respiratory distress.      Breath sounds: Normal breath sounds. No wheezing or rales.   Abdominal:      General: Bowel sounds are normal. There is no distension.      Palpations: Abdomen is soft.      Tenderness: There is no abdominal tenderness.   Musculoskeletal:         General: No  deformity. Normal range of motion.      Cervical back: Normal range of motion and neck supple.   Skin:     General: Skin is warm and dry.      Findings: No erythema or rash.      Comments: Right chest wall vas cath. Dressing c/d/i. No sign of infection to site.   Neurological:      Mental Status: He is alert and oriented to person, place, and time.   Psychiatric:         Behavior: Behavior normal.         Thought Content: Thought content normal.         Judgment: Judgment normal.            Significant Labs:   All pertinent labs from the last 24 hours have been reviewed.    Diagnostic Results:  I have reviewed all pertinent imaging results/findings within the past 24 hours.

## 2025-04-19 NOTE — ASSESSMENT & PLAN NOTE
- Body mass index is 32.02 kg/m². Morbid obesity complicates all aspects of disease management from diagnostic modalities to treatment. Weight loss encouraged and health benefits explained to patient.

## 2025-04-19 NOTE — PLAN OF CARE
Plan of care reviewed with patient. Pt is day +11 Jennifer Auto SCT. Tmax 102.3 this shift. Tylenol given. Free from falls or injury. No complaints of pain. NS infusing at 75. Cefepime given as scheduled. Pt up independently in room and bathroom. Bed locked in lowest position, non skid socks on, call light within reach. Pt instructed to call if any assistance is needed. Vitals stable. Family at bedside. Will cont to lou pt.

## 2025-04-20 LAB
ABSOLUTE EOSINOPHIL (OHS): 0 K/UL
ABSOLUTE MONOCYTE (OHS): 0.21 K/UL (ref 0.3–1)
ABSOLUTE NEUTROPHIL COUNT (OHS): 0.39 K/UL (ref 1.8–7.7)
ALBUMIN SERPL BCP-MCNC: 2.4 G/DL (ref 3.5–5.2)
ALP SERPL-CCNC: 62 UNIT/L (ref 40–150)
ALT SERPL W/O P-5'-P-CCNC: 22 UNIT/L (ref 10–44)
ANION GAP (OHS): 7 MMOL/L (ref 8–16)
AST SERPL-CCNC: 12 UNIT/L (ref 11–45)
BASOPHILS # BLD AUTO: 0.02 K/UL
BASOPHILS NFR BLD AUTO: 2.4 %
BILIRUB SERPL-MCNC: 0.3 MG/DL (ref 0.1–1)
BUN SERPL-MCNC: 7 MG/DL (ref 8–23)
CALCIUM SERPL-MCNC: 8.5 MG/DL (ref 8.7–10.5)
CHLORIDE SERPL-SCNC: 109 MMOL/L (ref 95–110)
CO2 SERPL-SCNC: 24 MMOL/L (ref 23–29)
CREAT SERPL-MCNC: 0.6 MG/DL (ref 0.5–1.4)
ERYTHROCYTE [DISTWIDTH] IN BLOOD BY AUTOMATED COUNT: 16.7 % (ref 11.5–14.5)
GFR SERPLBLD CREATININE-BSD FMLA CKD-EPI: >60 ML/MIN/1.73/M2
GLUCOSE SERPL-MCNC: 94 MG/DL (ref 70–110)
HCT VFR BLD AUTO: 24.3 % (ref 40–54)
HGB BLD-MCNC: 8.1 GM/DL (ref 14–18)
IMM GRANULOCYTES # BLD AUTO: 0 K/UL (ref 0–0.04)
IMM GRANULOCYTES NFR BLD AUTO: 0 % (ref 0–0.5)
LYMPHOCYTES # BLD AUTO: 0.22 K/UL (ref 1–4.8)
MAGNESIUM SERPL-MCNC: 1.8 MG/DL (ref 1.6–2.6)
MCH RBC QN AUTO: 30.3 PG (ref 27–31)
MCHC RBC AUTO-ENTMCNC: 33.3 G/DL (ref 32–36)
MCV RBC AUTO: 91 FL (ref 82–98)
NUCLEATED RBC (/100WBC) (OHS): 0 /100 WBC
PHOSPHATE SERPL-MCNC: 2.4 MG/DL (ref 2.7–4.5)
PLATELET # BLD AUTO: 15 K/UL (ref 150–450)
PLATELET BLD QL SMEAR: ABNORMAL
PMV BLD AUTO: 11.6 FL (ref 9.2–12.9)
POTASSIUM SERPL-SCNC: 3.4 MMOL/L (ref 3.5–5.1)
PROT SERPL-MCNC: 5 GM/DL (ref 6–8.4)
RBC # BLD AUTO: 2.67 M/UL (ref 4.6–6.2)
RELATIVE EOSINOPHIL (OHS): 0 %
RELATIVE LYMPHOCYTE (OHS): 26.2 % (ref 18–48)
RELATIVE MONOCYTE (OHS): 25 % (ref 4–15)
RELATIVE NEUTROPHIL (OHS): 46.4 % (ref 38–73)
SODIUM SERPL-SCNC: 140 MMOL/L (ref 136–145)
TOXIC GRANULES BLD QL SMEAR: PRESENT
WBC # BLD AUTO: 0.84 K/UL (ref 3.9–12.7)

## 2025-04-20 PROCEDURE — 63600175 PHARM REV CODE 636 W HCPCS: Performed by: NURSE PRACTITIONER

## 2025-04-20 PROCEDURE — 63600175 PHARM REV CODE 636 W HCPCS: Mod: JZ,TB | Performed by: INTERNAL MEDICINE

## 2025-04-20 PROCEDURE — 85025 COMPLETE CBC W/AUTO DIFF WBC: CPT | Performed by: INTERNAL MEDICINE

## 2025-04-20 PROCEDURE — 25000003 PHARM REV CODE 250: Performed by: INTERNAL MEDICINE

## 2025-04-20 PROCEDURE — 25000003 PHARM REV CODE 250: Performed by: NURSE PRACTITIONER

## 2025-04-20 PROCEDURE — 84100 ASSAY OF PHOSPHORUS: CPT | Performed by: NURSE PRACTITIONER

## 2025-04-20 PROCEDURE — 63600175 PHARM REV CODE 636 W HCPCS: Performed by: INTERNAL MEDICINE

## 2025-04-20 PROCEDURE — 20600001 HC STEP DOWN PRIVATE ROOM

## 2025-04-20 PROCEDURE — 83735 ASSAY OF MAGNESIUM: CPT | Performed by: NURSE PRACTITIONER

## 2025-04-20 PROCEDURE — 80053 COMPREHEN METABOLIC PANEL: CPT | Performed by: NURSE PRACTITIONER

## 2025-04-20 PROCEDURE — 99232 SBSQ HOSP IP/OBS MODERATE 35: CPT | Mod: ,,, | Performed by: INTERNAL MEDICINE

## 2025-04-20 RX ADMIN — ALUMINUM HYDROXIDE, MAGNESIUM HYDROXIDE, AND DIMETHICONE 10 ML: 400; 400; 40 SUSPENSION ORAL at 05:04

## 2025-04-20 RX ADMIN — CEFEPIME 2 G: 2 INJECTION, POWDER, FOR SOLUTION INTRAVENOUS at 10:04

## 2025-04-20 RX ADMIN — ALUMINUM HYDROXIDE, MAGNESIUM HYDROXIDE, AND DIMETHICONE 10 ML: 400; 400; 40 SUSPENSION ORAL at 08:04

## 2025-04-20 RX ADMIN — DIPHENOXYLATE HYDROCHLORIDE AND ATROPINE SULFATE 1 TABLET: 2.5; .025 TABLET ORAL at 08:04

## 2025-04-20 RX ADMIN — ACYCLOVIR 800 MG: 200 CAPSULE ORAL at 08:04

## 2025-04-20 RX ADMIN — POTASSIUM CHLORIDE 20 MEQ: 1500 TABLET, EXTENDED RELEASE ORAL at 10:04

## 2025-04-20 RX ADMIN — DIBASIC SODIUM PHOSPHATE, MONOBASIC POTASSIUM PHOSPHATE AND MONOBASIC SODIUM PHOSPHATE 1 TABLET: 852; 155; 130 TABLET ORAL at 05:04

## 2025-04-20 RX ADMIN — DICYCLOMINE HYDROCHLORIDE 10 MG: 10 CAPSULE ORAL at 05:04

## 2025-04-20 RX ADMIN — Medication 1 DOSE: at 05:04

## 2025-04-20 RX ADMIN — LOPERAMIDE HYDROCHLORIDE 2 MG: 2 CAPSULE ORAL at 08:04

## 2025-04-20 RX ADMIN — Medication 1 DOSE: at 01:04

## 2025-04-20 RX ADMIN — DICYCLOMINE HYDROCHLORIDE 10 MG: 10 CAPSULE ORAL at 08:04

## 2025-04-20 RX ADMIN — DIPHENOXYLATE HYDROCHLORIDE AND ATROPINE SULFATE 1 TABLET: 2.5; .025 TABLET ORAL at 05:04

## 2025-04-20 RX ADMIN — PREGABALIN 75 MG: 75 CAPSULE ORAL at 08:04

## 2025-04-20 RX ADMIN — PANTOPRAZOLE SODIUM 40 MG: 40 TABLET, DELAYED RELEASE ORAL at 08:04

## 2025-04-20 RX ADMIN — DIBASIC SODIUM PHOSPHATE, MONOBASIC POTASSIUM PHOSPHATE AND MONOBASIC SODIUM PHOSPHATE 1 TABLET: 852; 155; 130 TABLET ORAL at 01:04

## 2025-04-20 RX ADMIN — CEFEPIME 2 G: 2 INJECTION, POWDER, FOR SOLUTION INTRAVENOUS at 02:04

## 2025-04-20 RX ADMIN — Medication 1 DOSE: at 08:04

## 2025-04-20 RX ADMIN — DIPHENOXYLATE HYDROCHLORIDE AND ATROPINE SULFATE 1 TABLET: 2.5; .025 TABLET ORAL at 01:04

## 2025-04-20 RX ADMIN — ONDANSETRON 8 MG: 2 INJECTION INTRAMUSCULAR; INTRAVENOUS at 01:04

## 2025-04-20 RX ADMIN — FLUCONAZOLE 400 MG: 200 TABLET ORAL at 08:04

## 2025-04-20 RX ADMIN — LOPERAMIDE HYDROCHLORIDE 2 MG: 2 CAPSULE ORAL at 05:04

## 2025-04-20 RX ADMIN — LEVOTHYROXINE SODIUM 88 MCG: 88 TABLET ORAL at 05:04

## 2025-04-20 RX ADMIN — Medication 400 MG: at 05:04

## 2025-04-20 RX ADMIN — ONDANSETRON 8 MG: 2 INJECTION INTRAMUSCULAR; INTRAVENOUS at 05:04

## 2025-04-20 RX ADMIN — ONDANSETRON 8 MG: 2 INJECTION INTRAMUSCULAR; INTRAVENOUS at 09:04

## 2025-04-20 RX ADMIN — CEFEPIME 2 G: 2 INJECTION, POWDER, FOR SOLUTION INTRAVENOUS at 05:04

## 2025-04-20 RX ADMIN — ALUMINUM HYDROXIDE, MAGNESIUM HYDROXIDE, AND DIMETHICONE 10 ML: 400; 400; 40 SUSPENSION ORAL at 01:04

## 2025-04-20 RX ADMIN — SODIUM CHLORIDE: 9 INJECTION, SOLUTION INTRAVENOUS at 03:04

## 2025-04-20 RX ADMIN — DICYCLOMINE HYDROCHLORIDE 10 MG: 10 CAPSULE ORAL at 01:04

## 2025-04-20 RX ADMIN — POTASSIUM CHLORIDE 20 MEQ: 1500 TABLET, EXTENDED RELEASE ORAL at 05:04

## 2025-04-20 RX ADMIN — LOPERAMIDE HYDROCHLORIDE 2 MG: 2 CAPSULE ORAL at 01:04

## 2025-04-20 RX ADMIN — DIBASIC SODIUM PHOSPHATE, MONOBASIC POTASSIUM PHOSPHATE AND MONOBASIC SODIUM PHOSPHATE 1 TABLET: 852; 155; 130 TABLET ORAL at 10:04

## 2025-04-20 RX ADMIN — Medication 400 MG: at 10:04

## 2025-04-20 RX ADMIN — DULOXETINE HYDROCHLORIDE 20 MG: 20 CAPSULE, DELAYED RELEASE ORAL at 08:04

## 2025-04-20 RX ADMIN — FILGRASTIM 480 MCG: 480 INJECTION, SOLUTION INTRAVENOUS; SUBCUTANEOUS at 08:04

## 2025-04-20 NOTE — ASSESSMENT & PLAN NOTE
- C-diff neg  - Started Imodium QID, PRN  - changed Imodium to ATC on 4/15 and PRN Lomotil added   - IVF restarted 4/16  - will change Lomotil to ATC   - improving

## 2025-04-20 NOTE — PLAN OF CARE
Plan of care reviewed with patient. Pt is day +12 Jennifer Auto SCT. Pt is afebrile this shift. Free from falls or injury. No complaints of pain. NS infusing at 75. Cefepime given as scheduled. 3 loose stools this shift. Scheduled Lomotil and Imodium given.Pt up independently in room and bathroom. Bed locked in lowest position, non skid socks on, call light within reach. Pt instructed to call if any assistance is needed. Vitals stable. Family at bedside. Will cont to lou pt.

## 2025-04-20 NOTE — SUBJECTIVE & OBJECTIVE
Subjective:     Interval History: Day +12 from a Jennifer 200 auto SCT for MM. Ongoing diarrhea however improved and down to 3 episodes. Feels better overall.  today.    Objective:     Vital Signs (Most Recent):  Temp: 98.3 °F (36.8 °C) (04/20/25 1106)  Pulse: 86 (04/20/25 1106)  Resp: 17 (04/20/25 1106)  BP: 101/68 (04/20/25 1106)  SpO2: 99 % (04/20/25 1106) Vital Signs (24h Range):  Temp:  [98.1 °F (36.7 °C)-99 °F (37.2 °C)] 98.3 °F (36.8 °C)  Pulse:  [83-95] 86  Resp:  [17-20] 17  SpO2:  [94 %-100 %] 99 %  BP: (101-118)/(68-72) 101/68     Weight: 98.1 kg (216 lb 6.1 oz)  Body mass index is 31.95 kg/m².  Body surface area is 2.19 meters squared.    ECOG SCORE           [unfilled]    Intake/Output - Last 3 Shifts         04/18 0700  04/19 0659 04/19 0700  04/20 0659 04/20 0700  04/21 0659    P.O. 908 900 400    I.V. (mL/kg) 1216.9 (12.4) 1067.4 (10.9)     Total Intake(mL/kg) 2124.9 (21.6) 1967.4 (20.1) 400 (4.1)    Urine (mL/kg/hr)       Stool       Total Output       Net +2124.9 +1967.4 +400           Urine Occurrence 9 x 11 x 3 x    Stool Occurrence 8 x 10 x 3 x             Physical Exam  Constitutional:       Appearance: He is well-developed.   HENT:      Head: Normocephalic and atraumatic.      Mouth/Throat:      Pharynx: No oropharyngeal exudate.   Eyes:      General:         Right eye: No discharge.         Left eye: No discharge.      Conjunctiva/sclera: Conjunctivae normal.      Pupils: Pupils are equal, round, and reactive to light.   Cardiovascular:      Rate and Rhythm: Normal rate and regular rhythm.      Heart sounds: Normal heart sounds. No murmur heard.  Pulmonary:      Effort: Pulmonary effort is normal. No respiratory distress.      Breath sounds: Normal breath sounds. No wheezing or rales.   Abdominal:      General: Bowel sounds are normal. There is no distension.      Palpations: Abdomen is soft.      Tenderness: There is no abdominal tenderness.   Musculoskeletal:         General: No  deformity. Normal range of motion.      Cervical back: Normal range of motion and neck supple.   Skin:     General: Skin is warm and dry.      Findings: No erythema or rash.      Comments: Right chest wall vas cath. Dressing c/d/i. No sign of infection to site.   Neurological:      Mental Status: He is alert and oriented to person, place, and time.   Psychiatric:         Behavior: Behavior normal.         Thought Content: Thought content normal.         Judgment: Judgment normal.            Significant Labs:   All pertinent labs from the last 24 hours have been reviewed.    Diagnostic Results:  I have reviewed all pertinent imaging results/findings within the past 24 hours.

## 2025-04-20 NOTE — PLAN OF CARE
- Day +12 Jennifer Auto SCT for MM.   - Afebrile.  - watery  BM *6.  - Electrolytes replaced PRN as per protocol.      Went over POC with pt at beginning of shift.  Questions were asked and answered.  AAO x 4.  VSS.  Up to toilet independently. Voiding without difficulty. No complaints of pain.  Pt remained free from injury with bed in low locked position, call light with in reach, non-skid socks, and frequent rounding.  Pt instructed to call for assistance as needed. Wife at bedside. Denies needs at this time.

## 2025-04-20 NOTE — PROGRESS NOTES
[facility] - Oncology ([facility])  Hematology  Bone Marrow Transplant  Progress Note    Patient Name: Mitchel Joya  Admission Date: 4/6/2025  Hospital Length of Stay: 14 days  Code Status: Full Code    Subjective:     Interval History: Day +12 from a Jennifer 200 auto SCT for MM. Ongoing diarrhea however improved and down to 3 episodes. Feels better overall.  today.    Objective:     Vital Signs (Most Recent):  Temp: 98.3 °F (36.8 °C) (04/20/25 1106)  Pulse: 86 (04/20/25 1106)  Resp: 17 (04/20/25 1106)  BP: 101/68 (04/20/25 1106)  SpO2: 99 % (04/20/25 1106) Vital Signs (24h Range):  Temp:  [98.1 °F (36.7 °C)-99 °F (37.2 °C)] 98.3 °F (36.8 °C)  Pulse:  [83-95] 86  Resp:  [17-20] 17  SpO2:  [94 %-100 %] 99 %  BP: (101-118)/(68-72) 101/68     Weight: 98.1 kg (216 lb 6.1 oz)  Body mass index is 31.95 kg/m².  Body surface area is 2.19 meters squared.    ECOG SCORE           [unfilled]    Intake/Output - Last 3 Shifts         04/18 0700  04/19 0659 04/19 0700  04/20 0659 04/20 0700  04/21 0659    P.O. 908 900 400    I.V. (mL/kg) 1216.9 (12.4) 1067.4 (10.9)     Total Intake(mL/kg) 2124.9 (21.6) 1967.4 (20.1) 400 (4.1)    Urine (mL/kg/hr)       Stool       Total Output       Net +2124.9 +1967.4 +400           Urine Occurrence 9 x 11 x 3 x    Stool Occurrence 8 x 10 x 3 x             Physical Exam  Constitutional:       Appearance: He is well-developed.   HENT:      Head: Normocephalic and atraumatic.      Mouth/Throat:      Pharynx: No oropharyngeal exudate.   Eyes:      General:         Right eye: No discharge.         Left eye: No discharge.      Conjunctiva/sclera: Conjunctivae normal.      Pupils: Pupils are equal, round, and reactive to light.   Cardiovascular:      Rate and Rhythm: Normal rate and regular rhythm.      Heart sounds: Normal heart sounds. No murmur heard.  Pulmonary:      Effort: Pulmonary effort is normal. No respiratory distress.      Breath sounds: Normal breath sounds. No wheezing or rales.    Abdominal:      General: Bowel sounds are normal. There is no distension.      Palpations: Abdomen is soft.      Tenderness: There is no abdominal tenderness.   Musculoskeletal:         General: No deformity. Normal range of motion.      Cervical back: Normal range of motion and neck supple.   Skin:     General: Skin is warm and dry.      Findings: No erythema or rash.      Comments: Right chest wall vas cath. Dressing c/d/i. No sign of infection to site.   Neurological:      Mental Status: He is alert and oriented to person, place, and time.   Psychiatric:         Behavior: Behavior normal.         Thought Content: Thought content normal.         Judgment: Judgment normal.            Significant Labs:   All pertinent labs from the last 24 hours have been reviewed.    Diagnostic Results:  I have reviewed all pertinent imaging results/findings within the past 24 hours.  Assessment/Plan:     * History of autologous stem cell transplant  Patient of Dr. Hemphill  - Coordinator: Xochilt Diaz  - Conditioning Regimen: Melphalan 200 mg/m2  - Cell Dose: 5-10 x 10^6 CD34/kg  - Maintenance: Korina+Rev  - Caregiver: Partner   - Housing: [facility]   - Admitted 4/6/25 for Jennifer 200 Auto. Today is Day +12  - Stem cell infusion completed  4/08/25 at 11:30. Received 5 bags with a total CD34 dose of 4.39 x10^6/kg, tolerated without issue    Planned conditioning regimen:  Melphalan 200 on Day -1     Antimicrobial Prophylaxis:  Acyclovir starting on Day -1  Levofloxacin starting on Day -1  Fluconazole starting on Day -1  Bactrim starting on Day +30     Growth Factor Support:  Neupogen starting on Day +7     Insomnia  - reports minimal relief with PRN Melatonin  - Mirtazapine started 4/16, patient reports worked well  - cluster care at night     Hypotension  - likely due to volume depletion, diarrhea and poor po intake  - amlodipine stopped 4/15  - restarted IVF 4/16 with improvement     Chemotherapy-induced nausea  - Zofran to ATC on  4/15  - has PRN Compazine available  Controlled on current regimen    Chemotherapy-induced diarrhea  - C-diff neg  - Started Imodium QID, PRN  - changed Imodium to ATC on 4/15 and PRN Lomotil added   - IVF restarted 4/16  - will change Lomotil to ATC   - improving      Thrombocytopenia  - Anticipate pancytopenia following chemotherapy.  - Transfuse for plts < 10K.  - hold anticoagulation when platelets <50k  - Daily CBC while IP.    Pancytopenia due to chemotherapy  - Anticipated following chemotherapy.  - Transfuse for hgb < 7 or plts < 10K.  - Daily CBC while IP.  - Continuing antimicrobial ppx.    Immunocompromised state associated with stem cell transplant  - See Multiple myeloma not having achieved remission   - See Autologous stem cell transplant     Neoplastic (malignant) related fatigue  - PT/OT consulted per BMT protocol    At risk for malnutrition  - RD/Nutrition consulted per BMT protocol    Elevated BP without diagnosis of hypertension  - BP consistently high normal, not on antihypertensives at home.   - Monitoring closely  - amlodipine started on 4/10  - PRN hydralazine available.  - Stopped IVF 4/9  - BP normalized, amlodipine stopped 4/15   - now hypotensive 4/16, IVF restarted    Immunosuppressed due to chemotherapy  - continue ppx levaquin and diflucan   - On ppx Valtrex at home, no reported history of shingles. Transitioned to ppx acyclovir.     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  SPEP with 0.16 g/dL and 0.12 g/dL. FLCs normal.   PET/CT (3/7/25) - no new hypermetabolic lesions.    See Autologous Stem cell transplant     Obesity (BMI 30.0-34.9)  - Body mass index is 32.02 kg/m². Morbid obesity complicates all aspects of disease management from diagnostic modalities to treatment. Weight loss encouraged and health benefits explained to patient.          VTE Risk Mitigation (From admission, onward)           Ordered     heparin, porcine (PF) 100 unit/mL injection flush 300 Units  As needed (PRN)         04/06/25 2212     heparin (porcine) injection 1,600 Units  As needed (PRN)         04/06/25 2203     IP VTE HIGH RISK PATIENT  Once         04/06/25 1824     Place sequential compression device  Until discontinued         04/06/25 1824                    Disposition: tbd    Mirza Rosales MD  Bone Marrow Transplant  [facility] - Oncology ([facility])

## 2025-04-20 NOTE — ASSESSMENT & PLAN NOTE
Patient of Dr. Hemphill  - Coordinator: Xochilt Diaz  - Conditioning Regimen: Melphalan 200 mg/m2  - Cell Dose: 5-10 x 10^6 CD34/kg  - Maintenance: Korina+Rev  - Caregiver: Partner   - [facility]: [facility]   - Admitted 4/6/25 for Jennifer 200 Auto. Today is Day +12  - Stem cell infusion completed  4/08/25 at 11:30. Received 5 bags with a total CD34 dose of 4.39 x10^6/kg, tolerated without issue    Planned conditioning regimen:  Melphalan 200 on Day -1     Antimicrobial Prophylaxis:  Acyclovir starting on Day -1  Levofloxacin starting on Day -1  Fluconazole starting on Day -1  Bactrim starting on Day +30     Growth Factor Support:  Neupogen starting on Day +7

## 2025-04-21 PROBLEM — E87.6 HYPOKALEMIA: Status: ACTIVE | Noted: 2025-04-21

## 2025-04-21 PROBLEM — E83.39 HYPOPHOSPHATEMIA: Status: ACTIVE | Noted: 2025-04-21

## 2025-04-21 PROBLEM — R50.81 NEUTROPENIC FEVER: Status: ACTIVE | Noted: 2025-04-21

## 2025-04-21 PROBLEM — D70.9 NEUTROPENIC FEVER: Status: ACTIVE | Noted: 2025-04-21

## 2025-04-21 LAB
ABSOLUTE EOSINOPHIL (OHS): 0 K/UL
ABSOLUTE MONOCYTE (OHS): 0.47 K/UL (ref 0.3–1)
ABSOLUTE NEUTROPHIL COUNT (OHS): 2.35 K/UL (ref 1.8–7.7)
ALBUMIN SERPL BCP-MCNC: 2.5 G/DL (ref 3.5–5.2)
ALP SERPL-CCNC: 69 UNIT/L (ref 40–150)
ALT SERPL W/O P-5'-P-CCNC: 22 UNIT/L (ref 10–44)
ANION GAP (OHS): 9 MMOL/L (ref 8–16)
AST SERPL-CCNC: 12 UNIT/L (ref 11–45)
BASOPHILS # BLD AUTO: 0.05 K/UL
BASOPHILS NFR BLD AUTO: 1.5 %
BILIRUB SERPL-MCNC: 0.3 MG/DL (ref 0.1–1)
BUN SERPL-MCNC: 5 MG/DL (ref 8–23)
CALCIUM SERPL-MCNC: 8.7 MG/DL (ref 8.7–10.5)
CHLORIDE SERPL-SCNC: 107 MMOL/L (ref 95–110)
CO2 SERPL-SCNC: 25 MMOL/L (ref 23–29)
CREAT SERPL-MCNC: 0.7 MG/DL (ref 0.5–1.4)
ERYTHROCYTE [DISTWIDTH] IN BLOOD BY AUTOMATED COUNT: 16.7 % (ref 11.5–14.5)
GFR SERPLBLD CREATININE-BSD FMLA CKD-EPI: >60 ML/MIN/1.73/M2
GLUCOSE SERPL-MCNC: 88 MG/DL (ref 70–110)
HCT VFR BLD AUTO: 25.5 % (ref 40–54)
HGB BLD-MCNC: 8.4 GM/DL (ref 14–18)
IMM GRANULOCYTES # BLD AUTO: 0.03 K/UL (ref 0–0.04)
IMM GRANULOCYTES NFR BLD AUTO: 0.9 % (ref 0–0.5)
LYMPHOCYTES # BLD AUTO: 0.45 K/UL (ref 1–4.8)
MAGNESIUM SERPL-MCNC: 1.7 MG/DL (ref 1.6–2.6)
MCH RBC QN AUTO: 29.9 PG (ref 27–31)
MCHC RBC AUTO-ENTMCNC: 32.9 G/DL (ref 32–36)
MCV RBC AUTO: 91 FL (ref 82–98)
NUCLEATED RBC (/100WBC) (OHS): 0 /100 WBC
PHOSPHATE SERPL-MCNC: 2.6 MG/DL (ref 2.7–4.5)
PLATELET # BLD AUTO: 18 K/UL (ref 150–450)
PLATELET BLD QL SMEAR: ABNORMAL
PMV BLD AUTO: 9.7 FL (ref 9.2–12.9)
POTASSIUM SERPL-SCNC: 3.1 MMOL/L (ref 3.5–5.1)
PROT SERPL-MCNC: 5.2 GM/DL (ref 6–8.4)
RBC # BLD AUTO: 2.81 M/UL (ref 4.6–6.2)
RELATIVE EOSINOPHIL (OHS): 0 %
RELATIVE LYMPHOCYTE (OHS): 13.4 % (ref 18–48)
RELATIVE MONOCYTE (OHS): 14 % (ref 4–15)
RELATIVE NEUTROPHIL (OHS): 70.2 % (ref 38–73)
SODIUM SERPL-SCNC: 141 MMOL/L (ref 136–145)
WBC # BLD AUTO: 3.35 K/UL (ref 3.9–12.7)

## 2025-04-21 PROCEDURE — 25000003 PHARM REV CODE 250: Performed by: NURSE PRACTITIONER

## 2025-04-21 PROCEDURE — 99232 SBSQ HOSP IP/OBS MODERATE 35: CPT | Mod: FS,,, | Performed by: INTERNAL MEDICINE

## 2025-04-21 PROCEDURE — 20600001 HC STEP DOWN PRIVATE ROOM

## 2025-04-21 PROCEDURE — 83735 ASSAY OF MAGNESIUM: CPT | Performed by: NURSE PRACTITIONER

## 2025-04-21 PROCEDURE — 63600175 PHARM REV CODE 636 W HCPCS: Performed by: NURSE PRACTITIONER

## 2025-04-21 PROCEDURE — 25000003 PHARM REV CODE 250: Performed by: INTERNAL MEDICINE

## 2025-04-21 PROCEDURE — 63600175 PHARM REV CODE 636 W HCPCS: Performed by: INTERNAL MEDICINE

## 2025-04-21 PROCEDURE — 80053 COMPREHEN METABOLIC PANEL: CPT | Performed by: NURSE PRACTITIONER

## 2025-04-21 PROCEDURE — 85025 COMPLETE CBC W/AUTO DIFF WBC: CPT | Performed by: INTERNAL MEDICINE

## 2025-04-21 PROCEDURE — 84100 ASSAY OF PHOSPHORUS: CPT | Performed by: NURSE PRACTITIONER

## 2025-04-21 PROCEDURE — 63600175 PHARM REV CODE 636 W HCPCS: Mod: JZ,TB | Performed by: INTERNAL MEDICINE

## 2025-04-21 RX ORDER — DICYCLOMINE HYDROCHLORIDE 10 MG/1
10 CAPSULE ORAL 4 TIMES DAILY PRN
Status: DISCONTINUED | OUTPATIENT
Start: 2025-04-21 | End: 2025-04-22 | Stop reason: HOSPADM

## 2025-04-21 RX ORDER — DIPHENOXYLATE HYDROCHLORIDE AND ATROPINE SULFATE 2.5; .025 MG/1; MG/1
1 TABLET ORAL 4 TIMES DAILY PRN
Status: DISCONTINUED | OUTPATIENT
Start: 2025-04-21 | End: 2025-04-22 | Stop reason: HOSPADM

## 2025-04-21 RX ORDER — ONDANSETRON HYDROCHLORIDE 2 MG/ML
8 INJECTION, SOLUTION INTRAVENOUS EVERY 8 HOURS PRN
Status: DISCONTINUED | OUTPATIENT
Start: 2025-04-21 | End: 2025-04-22 | Stop reason: HOSPADM

## 2025-04-21 RX ORDER — LOPERAMIDE HYDROCHLORIDE 2 MG/1
2 CAPSULE ORAL 4 TIMES DAILY PRN
Status: DISCONTINUED | OUTPATIENT
Start: 2025-04-21 | End: 2025-04-22 | Stop reason: HOSPADM

## 2025-04-21 RX ADMIN — ALUMINUM HYDROXIDE, MAGNESIUM HYDROXIDE, AND DIMETHICONE 10 ML: 400; 400; 40 SUSPENSION ORAL at 08:04

## 2025-04-21 RX ADMIN — Medication 1 DOSE: at 04:04

## 2025-04-21 RX ADMIN — DICYCLOMINE HYDROCHLORIDE 10 MG: 10 CAPSULE ORAL at 09:04

## 2025-04-21 RX ADMIN — CEFEPIME 2 G: 2 INJECTION, POWDER, FOR SOLUTION INTRAVENOUS at 01:04

## 2025-04-21 RX ADMIN — PANTOPRAZOLE SODIUM 40 MG: 40 TABLET, DELAYED RELEASE ORAL at 09:04

## 2025-04-21 RX ADMIN — FILGRASTIM 480 MCG: 480 INJECTION, SOLUTION INTRAVENOUS; SUBCUTANEOUS at 09:04

## 2025-04-21 RX ADMIN — Medication 1 DOSE: at 09:04

## 2025-04-21 RX ADMIN — DIPHENOXYLATE HYDROCHLORIDE AND ATROPINE SULFATE 1 TABLET: 2.5; .025 TABLET ORAL at 04:04

## 2025-04-21 RX ADMIN — POTASSIUM CHLORIDE 20 MEQ: 1500 TABLET, EXTENDED RELEASE ORAL at 05:04

## 2025-04-21 RX ADMIN — POTASSIUM CHLORIDE 20 MEQ: 1500 TABLET, EXTENDED RELEASE ORAL at 11:04

## 2025-04-21 RX ADMIN — LEVOTHYROXINE SODIUM 88 MCG: 88 TABLET ORAL at 05:04

## 2025-04-21 RX ADMIN — DIBASIC SODIUM PHOSPHATE, MONOBASIC POTASSIUM PHOSPHATE AND MONOBASIC SODIUM PHOSPHATE 1 TABLET: 852; 155; 130 TABLET ORAL at 04:04

## 2025-04-21 RX ADMIN — Medication 400 MG: at 05:04

## 2025-04-21 RX ADMIN — ONDANSETRON 8 MG: 2 INJECTION INTRAMUSCULAR; INTRAVENOUS at 05:04

## 2025-04-21 RX ADMIN — DIBASIC SODIUM PHOSPHATE, MONOBASIC POTASSIUM PHOSPHATE AND MONOBASIC SODIUM PHOSPHATE 1 TABLET: 852; 155; 130 TABLET ORAL at 05:04

## 2025-04-21 RX ADMIN — LOPERAMIDE HYDROCHLORIDE 2 MG: 2 CAPSULE ORAL at 09:04

## 2025-04-21 RX ADMIN — LOPERAMIDE HYDROCHLORIDE 2 MG: 2 CAPSULE ORAL at 02:04

## 2025-04-21 RX ADMIN — ACYCLOVIR 800 MG: 200 CAPSULE ORAL at 09:04

## 2025-04-21 RX ADMIN — DIPHENOXYLATE HYDROCHLORIDE AND ATROPINE SULFATE 1 TABLET: 2.5; .025 TABLET ORAL at 09:04

## 2025-04-21 RX ADMIN — Medication 400 MG: at 09:04

## 2025-04-21 RX ADMIN — DIBASIC SODIUM PHOSPHATE, MONOBASIC POTASSIUM PHOSPHATE AND MONOBASIC SODIUM PHOSPHATE 1 TABLET: 852; 155; 130 TABLET ORAL at 11:04

## 2025-04-21 RX ADMIN — Medication 1 DOSE: at 11:04

## 2025-04-21 RX ADMIN — HEPARIN SODIUM 1600 UNITS: 1000 INJECTION INTRAVENOUS; SUBCUTANEOUS at 11:04

## 2025-04-21 RX ADMIN — Medication 1 DOSE: at 08:04

## 2025-04-21 RX ADMIN — DIBASIC SODIUM PHOSPHATE, MONOBASIC POTASSIUM PHOSPHATE AND MONOBASIC SODIUM PHOSPHATE 1 TABLET: 852; 155; 130 TABLET ORAL at 09:04

## 2025-04-21 RX ADMIN — ACYCLOVIR 800 MG: 200 CAPSULE ORAL at 08:04

## 2025-04-21 RX ADMIN — DULOXETINE HYDROCHLORIDE 20 MG: 20 CAPSULE, DELAYED RELEASE ORAL at 09:04

## 2025-04-21 RX ADMIN — PREGABALIN 75 MG: 75 CAPSULE ORAL at 08:04

## 2025-04-21 RX ADMIN — POTASSIUM CHLORIDE 20 MEQ: 1500 TABLET, EXTENDED RELEASE ORAL at 09:04

## 2025-04-21 NOTE — PLAN OF CARE
Recommendations  --Continue Regular diet as tolerated and clinically indicated   --Continue Boost Plus daily   --Encourage good intakes   --Nursing: please continue to document % meal eaten on flowsheet   --RD to monitor weight, PO intake     Goals: Meet % EEN/EPN by next RD follow-up  Nutrition Goal Status: progressing towards goal  Communication of RD Recs: other (comment) (POC)

## 2025-04-21 NOTE — SUBJECTIVE & OBJECTIVE
Subjective:     Interval History: Day +13 from a Jennifer 200 auto SCT for MM. Engrafted today with ANC 2345. Afebrile since 4/18, infectious work-up negative. Stopping Neupogen, cefepime and diflucan today. Will change ATC nausea meds and antidiarrheals to PRN and stop IVF in anticipation of potential discharge tomorrow.     Objective:     Vital Signs (Most Recent):  Temp: 98.2 °F (36.8 °C) (04/21/25 0900)  Pulse: 80 (04/21/25 0900)  Resp: 20 (04/21/25 0900)  BP: 105/80 (04/21/25 0900)  SpO2: 96 % (04/21/25 0900) Vital Signs (24h Range):  Temp:  [98 °F (36.7 °C)-98.7 °F (37.1 °C)] 98.2 °F (36.8 °C)  Pulse:  [80-98] 80  Resp:  [17-24] 20  SpO2:  [94 %-99 %] 96 %  BP: (101-123)/(67-80) 105/80     Weight: 98.2 kg (216 lb 7.9 oz)  Body mass index is 31.97 kg/m².  Body surface area is 2.19 meters squared.      Intake/Output - Last 3 Shifts         04/19 0700  04/20 0659 04/20 0700  04/21 0659 04/21 0700  04/22 0659    P.O. 900 1110     I.V. (mL/kg) 1067.4 (10.9) 1789.5 (18.2)     Total Intake(mL/kg) 1967.4 (20.1) 2899.5 (29.5)     Urine (mL/kg/hr)  900 (0.4)     Stool  0     Total Output  900     Net +1967.4 +1999.5            Urine Occurrence 11 x 7 x     Stool Occurrence 10 x 8 x              Physical Exam  Constitutional:       Appearance: He is well-developed.   HENT:      Head: Normocephalic and atraumatic.      Mouth/Throat:      Pharynx: No oropharyngeal exudate.   Eyes:      General:         Right eye: No discharge.         Left eye: No discharge.      Conjunctiva/sclera: Conjunctivae normal.      Pupils: Pupils are equal, round, and reactive to light.   Cardiovascular:      Rate and Rhythm: Normal rate and regular rhythm.      Heart sounds: Normal heart sounds. No murmur heard.  Pulmonary:      Effort: Pulmonary effort is normal. No respiratory distress.      Breath sounds: Normal breath sounds. No wheezing or rales.   Abdominal:      General: Bowel sounds are normal. There is no distension.      Palpations:  Abdomen is soft.      Tenderness: There is no abdominal tenderness.   Musculoskeletal:         General: No deformity. Normal range of motion.      Cervical back: Normal range of motion and neck supple.   Skin:     General: Skin is warm and dry.      Findings: No erythema or rash.      Comments: Right chest wall vas cath. Dressing c/d/i. No sign of infection to site.   Neurological:      Mental Status: He is alert and oriented to person, place, and time.   Psychiatric:         Behavior: Behavior normal.         Thought Content: Thought content normal.         Judgment: Judgment normal.            Significant Labs:   CBC:   Recent Labs   Lab 04/20/25  0414 04/21/25  0425   WBC 0.84* 3.35*   HGB 8.1* 8.4*   HCT 24.3* 25.5*   PLT 15* 18*    and CMP:   Recent Labs   Lab 04/20/25 0414 04/21/25  0425    141   K 3.4* 3.1*    107   CO2 24 25   BUN 7* 5*   CREATININE 0.6 0.7   CALCIUM 8.5* 8.7   ALBUMIN 2.4* 2.5*   BILITOT 0.3 0.3   ALKPHOS 62 69   AST 12 12   ALT 22 22   ANIONGAP 7* 9       Diagnostic Results:  None

## 2025-04-21 NOTE — ASSESSMENT & PLAN NOTE
- febrile to 102.3 on 4/18  - BC NGTD and CRX wnl  - on Cefepime day 4, will stop today as engrafted and afebrile since 4/18

## 2025-04-21 NOTE — ASSESSMENT & PLAN NOTE
- reports minimal relief with PRN Melatonin  - Mirtazapine started 4/16, patient reports working well  - cluster care at night

## 2025-04-21 NOTE — ASSESSMENT & PLAN NOTE
- Zofran to ATC on 4/15  - has PRN Compazine available  Controlled on current regimen, will change Zofran back to PRN

## 2025-04-21 NOTE — NURSING
Plan of care reviewed with patient and family.  Fall precautions maintained, side rails up x2, call light in reach, bed in low position and locked, nonskid socks on.  On regular diet without difficulty.  Still having bowel movements today.  Receiving lomotil and imodium.  No complaints voiced .

## 2025-04-21 NOTE — ASSESSMENT & PLAN NOTE
- BP consistently high normal, not on antihypertensives at home.   - Monitoring closely  - amlodipine started on 4/10  - PRN hydralazine available.  - Stopped IVF 4/9  - BP normalized, amlodipine stopped 4/15   - hypotensive 4/16, IVF restarted with improvement

## 2025-04-21 NOTE — ASSESSMENT & PLAN NOTE
- Body mass index is 31.97 kg/m². Morbid obesity complicates all aspects of disease management from diagnostic modalities to treatment. Weight loss encouraged and health benefits explained to patient.

## 2025-04-21 NOTE — PROGRESS NOTES
[facility] - Oncology ([facility])  Hematology  Bone Marrow Transplant  Progress Note    Patient Name: Mitchel Joya  Admission Date: 4/6/2025  Hospital Length of Stay: 15 days  Code Status: Full Code    Subjective:     Interval History: Day +13 from a Jennifer 200 auto SCT for MM. Engrafted today with ANC 2345. Afebrile since 4/18, infectious work-up negative. Stopping Neupogen, cefepime and diflucan today. Will change ATC nausea meds and antidiarrheals to PRN and stop IVF in anticipation of potential discharge tomorrow.     Objective:     Vital Signs (Most Recent):  Temp: 98.2 °F (36.8 °C) (04/21/25 0900)  Pulse: 80 (04/21/25 0900)  Resp: 20 (04/21/25 0900)  BP: 105/80 (04/21/25 0900)  SpO2: 96 % (04/21/25 0900) Vital Signs (24h Range):  Temp:  [98 °F (36.7 °C)-98.7 °F (37.1 °C)] 98.2 °F (36.8 °C)  Pulse:  [80-98] 80  Resp:  [17-24] 20  SpO2:  [94 %-99 %] 96 %  BP: (101-123)/(67-80) 105/80     Weight: 98.2 kg (216 lb 7.9 oz)  Body mass index is 31.97 kg/m².  Body surface area is 2.19 meters squared.      Intake/Output - Last 3 Shifts         04/19 0700  04/20 0659 04/20 0700  04/21 0659 04/21 0700  04/22 0659    P.O. 900 1110     I.V. (mL/kg) 1067.4 (10.9) 1789.5 (18.2)     Total Intake(mL/kg) 1967.4 (20.1) 2899.5 (29.5)     Urine (mL/kg/hr)  900 (0.4)     Stool  0     Total Output  900     Net +1967.4 +1999.5            Urine Occurrence 11 x 7 x     Stool Occurrence 10 x 8 x              Physical Exam  Constitutional:       Appearance: He is well-developed.   HENT:      Head: Normocephalic and atraumatic.      Mouth/Throat:      Pharynx: No oropharyngeal exudate.   Eyes:      General:         Right eye: No discharge.         Left eye: No discharge.      Conjunctiva/sclera: Conjunctivae normal.      Pupils: Pupils are equal, round, and reactive to light.   Cardiovascular:      Rate and Rhythm: Normal rate and regular rhythm.      Heart sounds: Normal heart sounds. No murmur heard.  Pulmonary:      Effort: Pulmonary effort  is normal. No respiratory distress.      Breath sounds: Normal breath sounds. No wheezing or rales.   Abdominal:      General: Bowel sounds are normal. There is no distension.      Palpations: Abdomen is soft.      Tenderness: There is no abdominal tenderness.   Musculoskeletal:         General: No deformity. Normal range of motion.      Cervical back: Normal range of motion and neck supple.   Skin:     General: Skin is warm and dry.      Findings: No erythema or rash.      Comments: Right chest wall vas cath. Dressing c/d/i. No sign of infection to site.   Neurological:      Mental Status: He is alert and oriented to person, place, and time.   Psychiatric:         Behavior: Behavior normal.         Thought Content: Thought content normal.         Judgment: Judgment normal.            Significant Labs:   CBC:   Recent Labs   Lab 04/20/25  0414 04/21/25  0425   WBC 0.84* 3.35*   HGB 8.1* 8.4*   HCT 24.3* 25.5*   PLT 15* 18*    and CMP:   Recent Labs   Lab 04/20/25  0414 04/21/25  0425    141   K 3.4* 3.1*    107   CO2 24 25   BUN 7* 5*   CREATININE 0.6 0.7   CALCIUM 8.5* 8.7   ALBUMIN 2.4* 2.5*   BILITOT 0.3 0.3   ALKPHOS 62 69   AST 12 12   ALT 22 22   ANIONGAP 7* 9       Diagnostic Results:  None  Assessment/Plan:     * History of autologous stem cell transplant  Patient of Dr. Hemphill  - Coordinator: Xochilt Diaz  - Conditioning Regimen: Melphalan 200 mg/m2  - Cell Dose: 5-10 x 10^6 CD34/kg  - Maintenance: Korina+Rev  - Caregiver: Partner   - [facility]   - Admitted 4/6/25 for Jennifer 200 Auto. Today is Day +13.   - engrafted today 4/21 with ANC of 2345  - Stem cell infusion completed  4/08/25 at 11:30. Received 5 bags with a total CD34 dose of 4.39 x10^6/kg, tolerated without issue    Planned conditioning regimen:  Melphalan 200 on Day -1     Antimicrobial Prophylaxis:  Acyclovir starting on Day -1  Levofloxacin starting on Day -1  Fluconazole starting on Day -1  Bactrim starting on Day +30      Growth Factor Support:  Neupogen starting on Day +7     Multiple myeloma not having achieved remission  Treatment history:  10/29/24: C1 Korina Vrd (notes state velcade has been held due to neuropathy and lenalidomide has been held due to cytopenias), remained on this until pre-transplant restaging  11/15/24: Palliative radiation to right hip    Restaging:  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  SPEP with 0.16 g/dL and 0.12 g/dL. FLCs normal.   PET/CT (3/7/25) - no new hypermetabolic lesions.    See Autologous Stem cell transplant     Immunosuppressed due to chemotherapy  - will stop ppx levaquin and diflucan today as engrafted   - On ppx Valtrex at home, no reported history of shingles. Transitioned to ppx acyclovir.     Hypercoagulable state  - On daily ASA at home, will hold while inpatient  - Ppx lovenox until platelets <50k, stopped 4/15    Immunocompromised state associated with stem cell transplant  - See Multiple myeloma not having achieved remission   - See Autologous stem cell transplant     Chemotherapy-induced diarrhea  - C-diff neg  - Started Imodium QID, PRN  - changed Imodium to ATC on 4/15 and PRN Lomotil added   - IVF restarted 4/16  - Lomotil changed to ATC on 4/17   - improving, will change Lomotil and Imodium back to PRN and stop IVF      Pancytopenia due to chemotherapy  - Anticipated following chemotherapy.  - Transfuse for hgb < 7 or plts < 10K.  - Daily CBC while IP.  - Continuing antimicrobial ppx.    Chemotherapy-induced nausea  - Zofran to ATC on 4/15  - has PRN Compazine available  Controlled on current regimen, will change Zofran back to PRN     Thrombocytopenia  - Anticipate pancytopenia following chemotherapy.  - Transfuse for plts < 10K.  - holding anticoagulation as platelets <50k  - Daily CBC while IP.    At risk for malnutrition  - RD/Nutrition consulted per BMT protocol    Hypotension  - likely  due to volume depletion, diarrhea and poor po intake  - amlodipine stopped 4/15  - restarted IVF 4/16 with improvement     Elevated BP without diagnosis of hypertension  - BP consistently high normal, not on antihypertensives at home.   - Monitoring closely  - amlodipine started on 4/10  - PRN hydralazine available.  - Stopped IVF 4/9  - BP normalized, amlodipine stopped 4/15   - hypotensive 4/16, IVF restarted with improvement     Insomnia  - reports minimal relief with PRN Melatonin  - Mirtazapine started 4/16, patient reports working well  - Stkr.it care at night     Neoplastic (malignant) related fatigue  - PT/OT consulted per BMT protocol    Peripheral neuropathy  - Continue home Lyrica    Hypothyroid  - Continue home synthroid    Hyperlipidemia  - Holding home crestor while inpatient     Obesity (BMI 30.0-34.9)  - Body mass index is 31.97 kg/m². Morbid obesity complicates all aspects of disease management from diagnostic modalities to treatment. Weight loss encouraged and health benefits explained to patient.      Hypophosphatemia  - daily CMP  - replace per PRN electrolyte protocol    Hypokalemia  - daily CMP  - replace per PRN electrolyte protocol    Neutropenic fever  - febrile to 102.3 on 4/18  - BC NGTD and CRX wnl  - on Cefepime day 4, will stop today as engrafted and afebrile since 4/18        VTE Risk Mitigation (From admission, onward)           Ordered     heparin, porcine (PF) 100 unit/mL injection flush 300 Units  As needed (PRN)         04/06/25 2212     heparin (porcine) injection 1,600 Units  As needed (PRN)         04/06/25 2203     IP VTE HIGH RISK PATIENT  Once         04/06/25 1824     Place sequential compression device  Until discontinued         04/06/25 1824                    Disposition: potential discharge home tomorrow     Dulce Maria Saul NP  Bone Marrow Transplant  [facility] - Oncology ([facility])

## 2025-04-21 NOTE — PLAN OF CARE
Plan of care reviewed with patient. Pt is day +13 Jennifer Auto SCT. Pt is afebrile this shift. Free from falls or injury. No complaints of pain. NS infusing at 75. Cefepime given as scheduled. 2 loose stools this shift. Scheduled Lomotil and Imodium given.Pt up independently in room and bathroom. Bed locked in lowest position, non skid socks on, call light within reach. Pt instructed to call if any assistance is needed. Vitals stable. Wife at bedside. Will cont to lou pt.

## 2025-04-21 NOTE — ASSESSMENT & PLAN NOTE
Patient of Dr. Hemphill  - Coordinator: Xochilt Diaz  - Conditioning Regimen: Melphalan 200 mg/m2  - Cell Dose: 5-10 x 10^6 CD34/kg  - Maintenance: Korina+Rev  - Caregiver: Partner   - Housing: [facility]   - Admitted 4/6/25 for Jennifer 200 Auto. Today is Day +13.   - engrafted today 4/21 with ANC of 2345  - Stem cell infusion completed  4/08/25 at 11:30. Received 5 bags with a total CD34 dose of 4.39 x10^6/kg, tolerated without issue    Planned conditioning regimen:  Melphalan 200 on Day -1     Antimicrobial Prophylaxis:  Acyclovir starting on Day -1  Levofloxacin starting on Day -1  Fluconazole starting on Day -1  Bactrim starting on Day +30     Growth Factor Support:  Neupogen starting on Day +7

## 2025-04-21 NOTE — ASSESSMENT & PLAN NOTE
- C-diff neg  - Started Imodium QID, PRN  - changed Imodium to ATC on 4/15 and PRN Lomotil added   - IVF restarted 4/16  - Lomotil changed to ATC on 4/17   - improving, will change Lomotil and Imodium back to PRN and stop IVF

## 2025-04-21 NOTE — PT/OT/SLP PROGRESS
"Physical Therapy      Patient Name:  Mitchel Joya   MRN:  [id number]    12:19 pm  Patient not seen today secondary to Other (Comment) (Pt politely declines tx session stating "I'm still sleepy.  I will go for a walk on my later today".  Pt reports he's been walking independently in hallway everyday.). Will follow-up on next scheduled visit.    "

## 2025-04-21 NOTE — ASSESSMENT & PLAN NOTE
- Anticipate pancytopenia following chemotherapy.  - Transfuse for plts < 10K.  - holding anticoagulation as platelets <50k  - Daily CBC while IP.

## 2025-04-21 NOTE — PROGRESS NOTES
[facility] - Oncology ([facility])  Adult Nutrition  Progress Note    SUMMARY       Recommendations  --Continue Regular diet as tolerated and clinically indicated   --Continue Boost Plus daily   --Encourage good intakes   --Nursing: please continue to document % meal eaten on flowsheet   --RD to monitor weight, PO intake     Goals: Meet % EEN/EPN by next RD follow-up  Nutrition Goal Status: progressing towards goal  Communication of RD Recs: other (comment) (POC)    Nutrition Discharge Planning    Nutrition Discharge Planning: Other (comments)  Other (comments): RD to provide high calorie, high protein diet education and food safety education for bone marrow transplant.    Assessment and Plan    Nutrition Problem  Inadequate energy intake     Related to (etiology):   Decreased appetite     Signs and Symptoms (as evidenced by):   Less than 75% EEN/EPN met with diet      Interventions/Recommendations (treatment strategy):  Boost      Nutrition Diagnosis Status:   Continues       Nutrition Problem  Inadequate oral intake      Related to (etiology):   Difficulty swallowing secondary to phlegm      Signs and Symptoms (as evidenced by):   Intake <70% of EEN      Interventions/Recommendations (treatment strategy):  Collaboration of nutritional care with other providers         Nutrition Diagnosis Status:   Resolving      Reason for Assessment    Reason For Assessment: RD follow-up  Diagnosis:  (Stem cell transplant candidate)  General Information Comments: 64 year old male with history of multiple myeloma, peripheral neuropathy, hypothyroidism and hyperlipidemia presents as a direct admission for Jennifer 200 Autologous stem cell transplant. RD follow-up. Pt with poor PO intake - noted 0% PO intake. Weight beginning to trend downward - noted 6 lb weight loss x 3 weeks - not significant. Pt at risk for malnutrition if PO intake does not improve. DTR attempted to educate pt on high calorie/high protein diet and food safety - pt  "asleep at time of visit. RD to follow-up with education.      Nutrition Related Social Determinants of Health: SDOH: None Identified     Food Insecurity: Patient Declined (4/8/2025)    Hunger Vital Sign     Worried About Running Out of Food in the Last Year: Patient declined     Ran Out of Food in the Last Year: Patient declined       Nutrition/Diet History    Spiritual, Cultural Beliefs, Sikh Practices, Values that Affect Care: no  Food Allergies: NKFA  Factors Affecting Nutritional Intake: decreased appetite, diarrhea    Anthropometrics    Height: 5' 9" (175.3 cm)  Height (inches): 69 in  Height Method: Stated  Weight: 98.2 kg (216 lb 7.9 oz)  Weight (lb): 216.49 lb  Weight Method: Standard Scale  Ideal Body Weight (IBW), Male: 160 lb  % Ideal Body Weight, Male (lb): 138.41 %  BMI (Calculated): 32  BMI Grade: 30 - 34.9- obesity - grade I       Lab/Procedures/Meds    Pertinent Labs Reviewed: reviewed - K 3.1, BUN 5, P 2.6    Pertinent Medications Reviewed: reviewed - levothyroxine, pantoprazole, sodium bicarb-sodium chloride    Estimated/Assessed Needs    Weight Used For Calorie Calculations: 98.2 kg (216 lb 7.9 oz)  Energy Calorie Requirements (kcal): 3053-2632 kcal/day (x 1.0-1.25 AF)  Energy Need Method: [place]  Protein Requirements: 05310 g/day (1.0-1.2 g/kg)  Weight Used For Protein Calculations: 98.2 kg (216 lb 7.9 oz)  Fluid Requirements (mL): per MD  Estimated Fluid Requirement Method: RDA Method  RDA Method (mL): 1752         Nutrition Prescription Ordered    Current Diet Order: Regular  Oral Nutrition Supplements: Boost Plus daily    Evaluation of Received Nutrient/Fluid Intake    Energy Calories Required: not meeting needs  Protein Required: not meeting needs  Fluid Required: not meeting needs  Comments: LBM 4/20  Tolerance: tolerating  % Intake of Estimated Energy Needs: 0 - 25 %  % Meal Intake: 0 - 25 %    Nutrition Risk    Level of Risk/Frequency of Follow-up: moderate     Monitor and " Evaluation    Monitor and Evaluation: Nutrition focused physical findings, Lipid profile, Inflammatory profile, Glucose/endocrine profile, Gastrointestinal profile, Electrolyte and renal panel, Weight, Energy intake, Food and beverage intake     Nutrition Follow-Up    RD Follow-up?: Yes

## 2025-04-21 NOTE — ASSESSMENT & PLAN NOTE
- will stop ppx levaquin and diflucan today as engrafted   - On ppx Valtrex at home, no reported history of shingles. Transitioned to ppx acyclovir.

## 2025-04-22 VITALS
OXYGEN SATURATION: 98 % | SYSTOLIC BLOOD PRESSURE: 117 MMHG | TEMPERATURE: 98 F | DIASTOLIC BLOOD PRESSURE: 72 MMHG | WEIGHT: 216.69 LBS | HEIGHT: 69 IN | BODY MASS INDEX: 32.09 KG/M2 | HEART RATE: 99 BPM | RESPIRATION RATE: 20 BRPM

## 2025-04-22 LAB
ABO + RH BLD: NORMAL
ABSOLUTE EOSINOPHIL (OHS): 0 K/UL
ABSOLUTE MONOCYTE (OHS): 0.81 K/UL (ref 0.3–1)
ABSOLUTE NEUTROPHIL COUNT (OHS): 5.63 K/UL (ref 1.8–7.7)
ABSOLUTE NEUTROPHIL MANUAL (OHS): 6.4 K/UL
ALBUMIN SERPL BCP-MCNC: 2.4 G/DL (ref 3.5–5.2)
ALP SERPL-CCNC: 82 UNIT/L (ref 40–150)
ALT SERPL W/O P-5'-P-CCNC: 20 UNIT/L (ref 10–44)
ANION GAP (OHS): 7 MMOL/L (ref 8–16)
AST SERPL-CCNC: 13 UNIT/L (ref 11–45)
BASOPHILS # BLD AUTO: 0.09 K/UL
BASOPHILS NFR BLD AUTO: 1.2 %
BILIRUB SERPL-MCNC: 0.3 MG/DL (ref 0.1–1)
BLD PROD TYP BPU: NORMAL
BLOOD UNIT EXPIRATION DATE: NORMAL
BLOOD UNIT TYPE CODE: 6200
BUN SERPL-MCNC: 4 MG/DL (ref 8–23)
CALCIUM SERPL-MCNC: 8.4 MG/DL (ref 8.7–10.5)
CHLORIDE SERPL-SCNC: 106 MMOL/L (ref 95–110)
CO2 SERPL-SCNC: 28 MMOL/L (ref 23–29)
CREAT SERPL-MCNC: 0.6 MG/DL (ref 0.5–1.4)
CROSSMATCH INTERPRETATION: NORMAL
DISPENSE STATUS: NORMAL
ERYTHROCYTE [DISTWIDTH] IN BLOOD BY AUTOMATED COUNT: 17.2 % (ref 11.5–14.5)
GFR SERPLBLD CREATININE-BSD FMLA CKD-EPI: >60 ML/MIN/1.73/M2
GLUCOSE SERPL-MCNC: 85 MG/DL (ref 70–110)
HCT VFR BLD AUTO: 26.3 % (ref 40–54)
HGB BLD-MCNC: 8.5 GM/DL (ref 14–18)
IMM GRANULOCYTES # BLD AUTO: 0.47 K/UL (ref 0–0.04)
IMM GRANULOCYTES NFR BLD AUTO: 6.3 % (ref 0–0.5)
INDIRECT COOMBS: NORMAL
LYMPHOCYTES # BLD AUTO: 0.51 K/UL (ref 1–4.8)
LYMPHOCYTES NFR BLD MANUAL: 4 % (ref 18–48)
MAGNESIUM SERPL-MCNC: 1.7 MG/DL (ref 1.6–2.6)
MCH RBC QN AUTO: 29.4 PG (ref 27–31)
MCHC RBC AUTO-ENTMCNC: 32.3 G/DL (ref 32–36)
MCV RBC AUTO: 91 FL (ref 82–98)
MONOCYTES NFR BLD MANUAL: 11 % (ref 4–15)
NEUTROPHILS NFR BLD MANUAL: 83 % (ref 38–73)
NEUTS BAND NFR BLD MANUAL: 2 %
NUCLEATED RBC (/100WBC) (OHS): 0 /100 WBC
PHOSPHATE SERPL-MCNC: 2.9 MG/DL (ref 2.7–4.5)
PLATELET # BLD AUTO: 26 K/UL (ref 150–450)
PMV BLD AUTO: ABNORMAL FL
POTASSIUM SERPL-SCNC: 3.1 MMOL/L (ref 3.5–5.1)
PROT SERPL-MCNC: 5 GM/DL (ref 6–8.4)
RBC # BLD AUTO: 2.89 M/UL (ref 4.6–6.2)
RELATIVE EOSINOPHIL (OHS): 0 %
RELATIVE LYMPHOCYTE (OHS): 6.8 % (ref 18–48)
RELATIVE MONOCYTE (OHS): 10.8 % (ref 4–15)
RELATIVE NEUTROPHIL (OHS): 74.9 % (ref 38–73)
RH BLD: NORMAL
SODIUM SERPL-SCNC: 141 MMOL/L (ref 136–145)
SPECIMEN OUTDATE: NORMAL
TOXIC GRANULES BLD QL SMEAR: PRESENT
UNIT NUMBER: NORMAL
WBC # BLD AUTO: 7.51 K/UL (ref 3.9–12.7)

## 2025-04-22 PROCEDURE — 25000003 PHARM REV CODE 250: Performed by: INTERNAL MEDICINE

## 2025-04-22 PROCEDURE — 99239 HOSP IP/OBS DSCHRG MGMT >30: CPT | Mod: FS,,, | Performed by: INTERNAL MEDICINE

## 2025-04-22 PROCEDURE — 25000003 PHARM REV CODE 250: Performed by: NURSE PRACTITIONER

## 2025-04-22 PROCEDURE — P9037 PLATE PHERES LEUKOREDU IRRAD: HCPCS | Performed by: NURSE PRACTITIONER

## 2025-04-22 PROCEDURE — 63600175 PHARM REV CODE 636 W HCPCS: Performed by: INTERNAL MEDICINE

## 2025-04-22 PROCEDURE — 84520 ASSAY OF UREA NITROGEN: CPT | Performed by: NURSE PRACTITIONER

## 2025-04-22 PROCEDURE — 85007 BL SMEAR W/DIFF WBC COUNT: CPT | Performed by: INTERNAL MEDICINE

## 2025-04-22 PROCEDURE — 36430 TRANSFUSION BLD/BLD COMPNT: CPT

## 2025-04-22 PROCEDURE — 86901 BLOOD TYPING SEROLOGIC RH(D): CPT | Performed by: INTERNAL MEDICINE

## 2025-04-22 PROCEDURE — 84100 ASSAY OF PHOSPHORUS: CPT | Performed by: NURSE PRACTITIONER

## 2025-04-22 PROCEDURE — 83735 ASSAY OF MAGNESIUM: CPT | Performed by: NURSE PRACTITIONER

## 2025-04-22 RX ORDER — HYDROCODONE BITARTRATE AND ACETAMINOPHEN 500; 5 MG/1; MG/1
TABLET ORAL
Status: DISCONTINUED | OUTPATIENT
Start: 2025-04-22 | End: 2025-04-22 | Stop reason: HOSPADM

## 2025-04-22 RX ORDER — ASPIRIN 81 MG/1
81 TABLET ORAL DAILY
Start: 2025-04-22

## 2025-04-22 RX ORDER — SULFAMETHOXAZOLE AND TRIMETHOPRIM 800; 160 MG/1; MG/1
1 TABLET ORAL
Qty: 36 TABLET | Refills: 1 | Status: SHIPPED | OUTPATIENT
Start: 2025-05-08

## 2025-04-22 RX ORDER — LOPERAMIDE HYDROCHLORIDE 2 MG/1
2 CAPSULE ORAL 4 TIMES DAILY PRN
Qty: 30 CAPSULE | Refills: 0 | Status: SHIPPED | OUTPATIENT
Start: 2025-04-22 | End: 2025-05-02

## 2025-04-22 RX ORDER — ACYCLOVIR 800 MG/1
800 TABLET ORAL 2 TIMES DAILY
Qty: 60 TABLET | Refills: 11 | Status: SHIPPED | OUTPATIENT
Start: 2025-04-22 | End: 2026-04-22

## 2025-04-22 RX ORDER — POTASSIUM CHLORIDE 20 MEQ/1
40 TABLET, EXTENDED RELEASE ORAL DAILY
Qty: 14 TABLET | Refills: 0 | Status: SHIPPED | OUTPATIENT
Start: 2025-04-22 | End: 2025-04-29

## 2025-04-22 RX ADMIN — Medication 400 MG: at 10:04

## 2025-04-22 RX ADMIN — ALUMINUM HYDROXIDE, MAGNESIUM HYDROXIDE, AND DIMETHICONE 10 ML: 400; 400; 40 SUSPENSION ORAL at 09:04

## 2025-04-22 RX ADMIN — Medication 400 MG: at 06:04

## 2025-04-22 RX ADMIN — HEPARIN SODIUM 1600 UNITS: 1000 INJECTION INTRAVENOUS; SUBCUTANEOUS at 03:04

## 2025-04-22 RX ADMIN — ACYCLOVIR 800 MG: 200 CAPSULE ORAL at 09:04

## 2025-04-22 RX ADMIN — Medication 1 DOSE: at 09:04

## 2025-04-22 RX ADMIN — ALUMINUM HYDROXIDE, MAGNESIUM HYDROXIDE, AND DIMETHICONE 10 ML: 400; 400; 40 SUSPENSION ORAL at 01:04

## 2025-04-22 RX ADMIN — ACETAMINOPHEN 650 MG: 325 TABLET ORAL at 10:04

## 2025-04-22 RX ADMIN — LEVOTHYROXINE SODIUM 88 MCG: 88 TABLET ORAL at 06:04

## 2025-04-22 RX ADMIN — POTASSIUM CHLORIDE 20 MEQ: 1500 TABLET, EXTENDED RELEASE ORAL at 10:04

## 2025-04-22 RX ADMIN — DIPHENHYDRAMINE HYDROCHLORIDE 25 MG: 25 CAPSULE ORAL at 10:04

## 2025-04-22 RX ADMIN — POTASSIUM CHLORIDE 20 MEQ: 1500 TABLET, EXTENDED RELEASE ORAL at 01:04

## 2025-04-22 RX ADMIN — POTASSIUM CHLORIDE 20 MEQ: 1500 TABLET, EXTENDED RELEASE ORAL at 06:04

## 2025-04-22 RX ADMIN — Medication 1 DOSE: at 01:04

## 2025-04-22 RX ADMIN — DULOXETINE HYDROCHLORIDE 20 MG: 20 CAPSULE, DELAYED RELEASE ORAL at 09:04

## 2025-04-22 RX ADMIN — PANTOPRAZOLE SODIUM 40 MG: 40 TABLET, DELAYED RELEASE ORAL at 09:04

## 2025-04-22 NOTE — PLAN OF CARE
side rails up x2; call bell in place; bed in lowest, locked position; skid proof socks on; no evidence of skin breakdown; care plan explained to patient; no additional complaints at this time.  Pt is day + 14 of an auto SCT. Pt tolerated po, voids, BM x 3, ambulates in room. Vas Catheter d/c per general surgery. Dressing d/c/I. Pt received outpatient prescriptions. Pharmacy and NP teaching completed. Pt given AVS summary discharge instructions, reviewed appts, medications and prescriptions. Pt and family verbalized understanding. Wheel chair and cart provided, pt escorted out with daughter and PCT Danette to assist.  Pt with no c/o pain, n/v or diarrhea, or any other discomfort, VSS and afebrile

## 2025-04-22 NOTE — PROGRESS NOTES
[facility] - Oncology ([facility])  Hematology  Bone Marrow Transplant  Progress Note    Patient Name: Mitchel Joya  Admission Date: 4/6/2025  Hospital Length of Stay: 16 days  Code Status: Full Code    Subjective:     Interval History: Day +14 from a Jennifer 200 auto SCT for MM. Day 2 of engraftment. Denies nausea, tolerated po. Reports diarrhea improving. Will discharge to [facility] today after Vascath removal and discharge teaching. Has BMT clinic follow-up on 4/25.     Objective:     Vital Signs (Most Recent):  Temp: 97.7 °F (36.5 °C) (04/22/25 0824)  Pulse: 80 (04/22/25 0824)  Resp: 16 (04/22/25 0824)  BP: 112/73 (04/22/25 0824)  SpO2: (!) 93 % (04/22/25 0824) Vital Signs (24h Range):  Temp:  [97.7 °F (36.5 °C)-98.8 °F (37.1 °C)] 97.7 °F (36.5 °C)  Pulse:  [75-84] 80  Resp:  [16-20] 16  SpO2:  [93 %-99 %] 93 %  BP: (106-112)/(66-73) 112/73     Weight: 98.3 kg (216 lb 11.4 oz)  Body mass index is 32 kg/m².  Body surface area is 2.19 meters squared.      Intake/Output - Last 3 Shifts         04/20 0700  04/21 0659 04/21 0700  04/22 0659 04/22 0700  04/23 0659    P.O. 1110 755     I.V. (mL/kg) 1789.5 (18.2)      Total Intake(mL/kg) 2899.5 (29.5) 755 (7.7)     Urine (mL/kg/hr) 900 (0.4) 1750 (0.7)     Stool 0 0     Total Output 900 1750     Net +1999.5 -995            Urine Occurrence 7 x 4 x     Stool Occurrence 8 x 5 x              Physical Exam  Constitutional:       Appearance: He is well-developed.   HENT:      Head: Normocephalic and atraumatic.      Mouth/Throat:      Pharynx: No oropharyngeal exudate.   Eyes:      General:         Right eye: No discharge.         Left eye: No discharge.      Conjunctiva/sclera: Conjunctivae normal.      Pupils: Pupils are equal, round, and reactive to light.   Cardiovascular:      Rate and Rhythm: Normal rate and regular rhythm.      Heart sounds: Normal heart sounds. No murmur heard.  Pulmonary:      Effort: Pulmonary effort is normal. No respiratory distress.      Breath  sounds: Normal breath sounds. No wheezing or rales.   Abdominal:      General: Bowel sounds are normal. There is no distension.      Palpations: Abdomen is soft.      Tenderness: There is no abdominal tenderness.   Musculoskeletal:         General: No deformity. Normal range of motion.      Cervical back: Normal range of motion and neck supple.   Skin:     General: Skin is warm and dry.      Findings: No erythema or rash.      Comments: Right chest wall vas cath. Dressing c/d/i. No sign of infection to site.   Neurological:      Mental Status: He is alert and oriented to person, place, and time.   Psychiatric:         Behavior: Behavior normal.         Thought Content: Thought content normal.         Judgment: Judgment normal.            Significant Labs:   CBC:   Recent Labs   Lab 04/21/25  0425 04/22/25  0341   WBC 3.35* 7.51   HGB 8.4* 8.5*   HCT 25.5* 26.3*   PLT 18* 26*    and CMP:   Recent Labs   Lab 04/21/25 0425 04/22/25  0341    141   K 3.1* 3.1*    106   CO2 25 28   BUN 5* 4*   CREATININE 0.7 0.6   CALCIUM 8.7 8.4*   ALBUMIN 2.5* 2.4*   BILITOT 0.3 0.3   ALKPHOS 69 82   AST 12 13   ALT 22 20   ANIONGAP 9 7*       Diagnostic Results:  None  Assessment/Plan:     * History of autologous stem cell transplant  Patient of Dr. Hemphill  - Coordinator: Xochilt Diaz  - Conditioning Regimen: Melphalan 200 mg/m2  - Cell Dose: 5-10 x 10^6 CD34/kg  - Maintenance: Korina+Rev  - Caregiver: Partner   - Housing: [facility]   - Admitted 4/6/25 for Jennifer 200 Auto. Today is Day +14.   - engrafted today 4/21 with ANC of 2345. Day 2 of engraftment   - Stem cell infusion completed  4/08/25 at 11:30. Received 5 bags with a total CD34 dose of 4.39 x10^6/kg, tolerated without issue    Planned conditioning regimen:  Melphalan 200 on Day -1     Antimicrobial Prophylaxis:  Acyclovir starting on Day -1  Levofloxacin starting on Day -1  Fluconazole starting on Day -1  Bactrim starting on Day +30     Growth Factor  Support:  Neupogen starting on Day +7     Multiple myeloma not having achieved remission  Treatment history:  10/29/24: C1 Korina Vrd (notes state velcade has been held due to neuropathy and lenalidomide has been held due to cytopenias), remained on this until pre-transplant restaging  11/15/24: Palliative radiation to right hip    Restaging:  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  SPEP with 0.16 g/dL and 0.12 g/dL. FLCs normal.   PET/CT (3/7/25) - no new hypermetabolic lesions.    See Autologous Stem cell transplant     Immunosuppressed due to chemotherapy  - will stop ppx levaquin and diflucan today as engrafted   - On ppx Valtrex at home, no reported history of shingles. Transitioned to ppx acyclovir.     Hypercoagulable state  - On daily ASA at home, will hold while inpatient  - Ppx lovenox until platelets <50k, stopped 4/15    Immunocompromised state associated with stem cell transplant  - See Multiple myeloma not having achieved remission   - See Autologous stem cell transplant     Chemotherapy-induced diarrhea  - C-diff neg  - Started Imodium QID, PRN  - changed Imodium to ATC on 4/15 and PRN Lomotil added   - IVF restarted 4/16  - Lomotil changed to ATC on 4/17   - improving, changed Lomotil and Imodium back to PRN and stopped IVF 4/21      Pancytopenia due to chemotherapy  - Anticipated following chemotherapy.  - Transfuse for hgb < 7 or plts < 10K.  - Daily CBC while IP.  - Continuing antimicrobial ppx.    Chemotherapy-induced nausea  - Zofran to ATC on 4/15  - has PRN Compazine available  Controlled on current regimen, changed Zofran back to PRN 4/21     Thrombocytopenia  - Anticipate pancytopenia following chemotherapy.  - Transfuse for plts < 10K.  - holding anticoagulation as platelets <50k  - Daily CBC while IP.    At risk for malnutrition  - RD/Nutrition consulted per BMT protocol    Hypotension  - likely due to  volume depletion, diarrhea and poor po intake  - amlodipine stopped 4/15  - restarted IVF 4/16 with improvement   RESOLVED    Elevated BP without diagnosis of hypertension  - BP consistently high normal, not on antihypertensives at home.   - Monitoring closely  - amlodipine started on 4/10  - PRN hydralazine available.  - Stopped IVF 4/9  - BP normalized, amlodipine stopped 4/15   - hypotensive 4/16, IVF restarted with improvement     Insomnia  - reports minimal relief with PRN Melatonin  - Mirtazapine started 4/16, patient reports working well  - [employer] care at night     Neoplastic (malignant) related fatigue  - PT/OT consulted per BMT protocol    Peripheral neuropathy  - Continue home Lyrica    Hypothyroid  - Continue home synthroid    Hyperlipidemia  - Holding home crestor while inpatient     Obesity (BMI 30.0-34.9)  - Body mass index is 32 kg/m². Morbid obesity complicates all aspects of disease management from diagnostic modalities to treatment.       Hypophosphatemia  - daily CMP  - replace per PRN electrolyte protocol    Hypokalemia  - daily CMP  - replace per PRN electrolyte protocol    Neutropenic fever  - febrile to 102.3 on 4/18  - BC NGTD and CRX wnl  - Cefepime x 4 days   RESOLVED        VTE Risk Mitigation (From admission, onward)           Ordered     heparin, porcine (PF) 100 unit/mL injection flush 300 Units  As needed (PRN)         04/06/25 2212     heparin (porcine) injection 1,600 Units  As needed (PRN)         04/06/25 2203     IP VTE HIGH RISK PATIENT  Once         04/06/25 1824     Place sequential compression device  Until discontinued         04/06/25 1824                    Disposition: discharge to [facility] today    Dulce Maria Saul NP  Bone Marrow Transplant  [facility] - Oncology ([facility])

## 2025-04-22 NOTE — SUBJECTIVE & OBJECTIVE
Subjective:     Interval History: Day +14 from a Jennifer 200 auto SCT for MM. Day 2 of engraftment. Denies nausea, tolerated po. Reports diarrhea improving. Will discharge to [facility] today after Vascath removal and discharge teaching. Has BMT clinic follow-up on 4/25.     Objective:     Vital Signs (Most Recent):  Temp: 97.7 °F (36.5 °C) (04/22/25 0824)  Pulse: 80 (04/22/25 0824)  Resp: 16 (04/22/25 0824)  BP: 112/73 (04/22/25 0824)  SpO2: (!) 93 % (04/22/25 0824) Vital Signs (24h Range):  Temp:  [97.7 °F (36.5 °C)-98.8 °F (37.1 °C)] 97.7 °F (36.5 °C)  Pulse:  [75-84] 80  Resp:  [16-20] 16  SpO2:  [93 %-99 %] 93 %  BP: (106-112)/(66-73) 112/73     Weight: 98.3 kg (216 lb 11.4 oz)  Body mass index is 32 kg/m².  Body surface area is 2.19 meters squared.      Intake/Output - Last 3 Shifts         04/20 0700  04/21 0659 04/21 0700  04/22 0659 04/22 0700  04/23 0659    P.O. 1110 755     I.V. (mL/kg) 1789.5 (18.2)      Total Intake(mL/kg) 2899.5 (29.5) 755 (7.7)     Urine (mL/kg/hr) 900 (0.4) 1750 (0.7)     Stool 0 0     Total Output 900 1750     Net +1999.5 -995            Urine Occurrence 7 x 4 x     Stool Occurrence 8 x 5 x              Physical Exam  Constitutional:       Appearance: He is well-developed.   HENT:      Head: Normocephalic and atraumatic.      Mouth/Throat:      Pharynx: No oropharyngeal exudate.   Eyes:      General:         Right eye: No discharge.         Left eye: No discharge.      Conjunctiva/sclera: Conjunctivae normal.      Pupils: Pupils are equal, round, and reactive to light.   Cardiovascular:      Rate and Rhythm: Normal rate and regular rhythm.      Heart sounds: Normal heart sounds. No murmur heard.  Pulmonary:      Effort: Pulmonary effort is normal. No respiratory distress.      Breath sounds: Normal breath sounds. No wheezing or rales.   Abdominal:      General: Bowel sounds are normal. There is no distension.      Palpations: Abdomen is soft.      Tenderness: There is no abdominal  tenderness.   Musculoskeletal:         General: No deformity. Normal range of motion.      Cervical back: Normal range of motion and neck supple.   Skin:     General: Skin is warm and dry.      Findings: No erythema or rash.      Comments: Right chest wall vas cath. Dressing c/d/i. No sign of infection to site.   Neurological:      Mental Status: He is alert and oriented to person, place, and time.   Psychiatric:         Behavior: Behavior normal.         Thought Content: Thought content normal.         Judgment: Judgment normal.            Significant Labs:   CBC:   Recent Labs   Lab 04/21/25 0425 04/22/25  0341   WBC 3.35* 7.51   HGB 8.4* 8.5*   HCT 25.5* 26.3*   PLT 18* 26*    and CMP:   Recent Labs   Lab 04/21/25 0425 04/22/25  0341    141   K 3.1* 3.1*    106   CO2 25 28   BUN 5* 4*   CREATININE 0.7 0.6   CALCIUM 8.7 8.4*   ALBUMIN 2.5* 2.4*   BILITOT 0.3 0.3   ALKPHOS 69 82   AST 12 13   ALT 22 20   ANIONGAP 9 7*       Diagnostic Results:  None

## 2025-04-22 NOTE — ASSESSMENT & PLAN NOTE
- C-diff neg  - Started Imodium QID, PRN  - changed Imodium to ATC on 4/15 and PRN Lomotil added   - IVF restarted 4/16  - Lomotil changed to ATC on 4/17   - improving, changed Lomotil and Imodium back to PRN and stopped IVF 4/21

## 2025-04-22 NOTE — PROGRESS NOTES
Discharge teaching done with patient and patient's caregiver on BMT unit.  Approximately 45 minutes spent on discussion of post-transplant guidelines including:  Low microbial diet, safe food handling, dining out, home sanitation, outpatient plan of care, progression of recovery of cells and immune system, ways to prevent infection, fatigue, ways to avoid bleeding, pet and plant exposure and care, returning to work, smoking and alcohol consumption, sexual activity, immunizations, traveling, nutrition, personal hygiene, hand washing, oral care, eye care, signs and symptoms to report immediately, and emotional changes post transplant.  Also discussed who to contact during business hours, after hours, and holidays.  Written materials supplied.  Patient and family given the opportunity to ask questions.  Verbalizes understanding.  All questions answered to their satisfaction.  Patient and family instructed to call with any questions or concerns.  Patient and caregiver were given handouts which reiterate all the above teaching.     Dulce Maria Saul NP  Hematology/BMT

## 2025-04-22 NOTE — ASSESSMENT & PLAN NOTE
- likely due to volume depletion, diarrhea and poor po intake  - amlodipine stopped 4/15  - restarted IVF 4/16 with improvement   RESOLVED

## 2025-04-22 NOTE — DISCHARGE SUMMARY
"[facility] - Oncology ([facility])  Hematology  Bone Marrow Transplant  Discharge Summary      Patient Name: Mitchel Joya  MRN: [id number]  Admission Date: 4/6/2025  Hospital Length of Stay: 16 days  Discharge Date and Time: 4/22/2025  4:50 PM  Attending Physician: No att. providers found   Discharging Provider: Dulce Maria Saul NP  Primary Care Provider: Juan Pablo Pereira MD    HPI: 64 year old male with history of multiple myeloma, peripheral neuropathy, hypothyroidism and hyperlipidemia presents as a direct admission for Jennifer 200 Autologous stem cell transplant. Underwent stem cell mobilization and collection without issue. He had an ultrasound on his central line 3/31 that was negative for DVT. His primary complaint is due to peripheral neuropathy (numbness, burning pain) in his feet, worse at night and started pregabalin 3 weeks ago. He has not noticed any significant improvements. He reports that 4/3 he had some chest congestion and coughed up some dark, yellow phlegm. He has some chronic complaints of phlegm with swallowing complaints for which he believes he started protonix, but he has not actual complaints of GERD. His bowels have been more mucousy than before. Denies fever, chills, sore throat, runny nose, dysuria, shortness of breath, chest pain, nause/vomiting. He was seen in clinic for admission visit on 4/4 and respiratory infection panel was completed.     * No surgery found *     Hospital Course: 04/07/2025 admitted yesterday for Jennifer 200 Autologous stem cell transplant for IgG multiple myeloma. BP high normal. Reports peripheral neuropathy improving with lyrica. Has no other complaints today.   04/08/2025 Day 0 Jennifer 200 Auto for IgG Multiple Myeloma. Received 4.39x10^6 CD34 stem cells (5 bags) at 11:30 am and tolerated without difficulty. BP improved today. No complaints today except feeling "jittery" prior to transplant.   04/09/2025: Day +1 from a Jennifer 200 auto SCT for MM. Received stem cells yesterday. " Tolerated transplant well. Remains afebrile. VSS. Weight up 6.5 lbs from admission. Received large volume of IV fluids with transplant. Good oral intake, so stopping IVF. Does not appear volume overloaded, so will defer diuresis.  04/10/2025: Day +2 from a Jennifer 200 auto SCT for MM. Remains afebrile. VSS. Denies GI toxicities at this time. No mucositis. Oral intake remains good. Will resume IVF with reduced oral fluid intake or with fluid losses through diarrhea or emesis. Remains active.   04/11/2025: Day +3 from a Jennifer 200 auto SCT for MM. Remains afebrile. VSS. Blood counts dropping predictably. Having nausea yesterday but no emesis. PRN Zofran started. Can schedule antiemetics if indicated. Still no diarrhea or mucositis.  04/12/2025 D+4 from [id number] AutoSCT for standard-risk MM. No overnight events. VSS. Endorses mild fatigue, one liquid BM yesterday - none today so far. Wife at bedside updated of anticipated clinical stay.   04/13/2025 D+5 from [id number] AutoSCT for standard-risk MM. No overnight events. VSS. Overall tolerating well, having BM but none are liquid.   04/14/2025: Day +6 from a Jennifer 200 auto SCT for MM. Remains afebrile. VSS. Having infrequent c-diff neg diarrhea. PRN imodium available. Oral intake remains good. No oral mucositis. Will resume IVF with worsening oral fluid intake or increased diarrhea.  04/15/2025 Day +7 from a Jennifer 200 auto SCT for MM. Multiple GI complaints. Reports abdominal cramping, Bentyl started. Vomited this am, changed Zofran to ATC. Continues with diarrhea, BMx5. Will schedule Imodium and added lomotil PRN. VSS, BP improved. Stopping Amlodipine.   04/16/2025 Day +8 from a Jennifer 200 auto SCT for MM. Reports nausea improved with ATC Zofran. BMx7. Hypotensive overnight. Will restart IVF. Stopped lovenox as platelets <50k.   04/17/2025 Day +9 from a Jennifer 200 auto SCT for MM. Nausea controlled. BM x 7, will change lomotil to ATC. BP improved with IVF. Reports slept well with addition of  Mirtazapine.  04/18/2025 Day +10 from a Jennifer 200 auto SCT for MM. Nausea fairly controlled but has had multipel bowel movmements (+7) despite imodium and lomotil scheduled. Adding TOO. Appetite is still off.  04/19/2025 Day +11 from a Jennifer 200 auto SCT for MM. Ongoing diarrhea and supporting with loperamide, lomotil, and TOO prn. ANC 20 today.  04/20/2025 Day +12 from a Jennifer 200 auto SCT for MM. Ongoing diarrhea however improved and down to 3 episodes. Feels better overall.  today.  04/21/2025 Day +13 from a Jennifer 200 auto SCT for MM. Engrafted today with ANC 2345. Afebrile since 4/18, infectious work-up negative. Stopping Neupogen, cefepime and diflucan today. Will change ATC nausea meds and antidiarrheals to PRN and stop IVF in anticipation of potential discharge tomorrow.   04/22/2025 Day +14 from a Jennifer 200 auto SCT for MM. Day 2 of engraftment. Denies nausea, tolerated po. Reports diarrhea improving. Will discharge to [facility] today after Vascath removal and discharge teaching. Has BMT clinic follow-up on 4/25.     Goals of Care Treatment Preferences:  Code Status: Full Code    ADMISSION SUMMARY:  Admitted for Jennifer 200 Auto transplant for Multiple Myeloma. Tolerated stem cell infusion without issue. Experienced expected side effects of nausea, ultimately controlled with ATC Zofran and PRN Compazine. Diarrhea C.diff negative and treated with ATC Imodium and ATC lomotil. Neutropenic fever, negative infectious work-up and treated with empiric Cefepime. Engrafted on day +13. Discharged to [facility] after Vascath removal and discharge teaching completed. BMT clinic follow-up scheduled 4/25.     Consults (From admission, onward)          Status Ordering Provider     Inpatient consult to General Surgery  Once        Provider:  (Not yet assigned)    Completed M[facility] RG     Inpatient consult to Registered Dietitian/Nutritionist  Once        Provider:  (Not yet assigned)    Completed MOR RG             Significant Diagnostic Studies: Labs: CMP   Recent Labs   Lab 04/21/25  0425 04/22/25  0341    141   K 3.1* 3.1*    106   CO2 25 28   BUN 5* 4*   CREATININE 0.7 0.6   CALCIUM 8.7 8.4*   ALBUMIN 2.5* 2.4*   BILITOT 0.3 0.3   ALKPHOS 69 82   AST 12 13   ALT 22 20   ANIONGAP 9 7*    and CBC   Recent Labs   Lab 04/21/25  0425 04/22/25  0341   WBC 3.35* 7.51   HGB 8.4* 8.5*   HCT 25.5* 26.3*   PLT 18* 26*       Pending Diagnostic Studies:       None          Final Active Diagnoses:    Diagnosis Date Noted POA    PRINCIPAL PROBLEM:  History of autologous stem cell transplant [Z94.84] 03/12/2025 Not Applicable    Multiple myeloma not having achieved remission [C90.00] 03/10/2025 Yes    Immunosuppressed due to chemotherapy [D84.821, T45.1X5A, Z79.69] 04/04/2025 Not Applicable    Hypercoagulable state [D68.59] 04/04/2025 Yes    Immunocompromised state associated with stem cell transplant [D84.822, Z94.84] 04/07/2025 Not Applicable    Chemotherapy-induced diarrhea [K52.1, T45.1X5A] 04/14/2025 Yes    Pancytopenia due to chemotherapy [D61.810] 04/09/2025 Yes    Chemotherapy-induced nausea [R11.0, T45.1X5A] 04/15/2025 Yes    Thrombocytopenia [D69.6] 04/09/2025 Yes    At risk for malnutrition [Z91.89] 04/07/2025 Yes    Hypotension [I95.9] 04/16/2025 Yes    Elevated BP without diagnosis of hypertension [R03.0] 04/07/2025 Yes    Insomnia [G47.00] 04/16/2025 Yes    Neoplastic (malignant) related fatigue [R53.0] 04/07/2025 Yes    Peripheral neuropathy [G62.9] 04/04/2025 Yes    Hypothyroid [E03.9] 04/04/2025 Yes    Hyperlipidemia [E78.5] 04/04/2025 Yes    Obesity (BMI 30.0-34.9) [E66.811] 12/21/2021 Yes    Neutropenic fever [D70.9, R50.81] 04/21/2025 Yes    Hypokalemia [E87.6] 04/21/2025 Yes    Hypophosphatemia [E83.39] 04/21/2025 Yes      Problems Resolved During this Admission:      Discharged Condition: stable    Disposition: Home or Self Care    Follow Up:   Future Appointments   Date Time Provider Department  [place]   4/25/2025  7:10 AM LAB, HEMON CANCER BLDG [facility] [place]   4/25/2025  8:00 AM La Nena Pastrana, NP [facility] HC BMT [place]   4/29/2025  9:30 AM Yoni Freeman RD [facility] INONCTF [place]   4/30/2025  7:10 AM LAB, HEMON CANCER BLDG [facility] [place] [facility]   4/30/2025  8:00 AM La Nena Pastrana, NP [facility] HC BMT [place]   5/5/2025  [facility] [place]   5/5/2025  8:40 AM La Nena Pastrana, NP [facility] BMT [place]   7/16/2025  9:30 AM [facility] PET CT LIMIT 500 LBS [facility] PET CT [facility]   7/17/2025  8:30 AM LAB, HEMON CANCER BLDG [facility] LAB HO [place]   7/17/2025  9:30 AM Kevin Hemphill MD [facility] BMT [place]        Patient Instructions:      NM PET CT FDG Skull Base to Mid Thigh   Standing Status: Future Standing Exp. Date: 04/08/26     Order Specific Question Answer Comments   May the Radiologist modify the order per protocol to meet the clinical needs of the patient? Yes      Notify your health care provider if you experience any of the following:  temperature >100.4     Notify your health care provider if you experience any of the following:  persistent nausea and vomiting or diarrhea     Notify your health care provider if you experience any of the following:  severe uncontrolled pain     Notify your health care provider if you experience any of the following:  redness, tenderness, or signs of infection (pain, swelling, redness, odor or green/yellow discharge around incision site)     Notify your health care provider if you experience any of the following:  difficulty breathing or increased cough     Notify your health care provider if you experience any of the following:  persistent dizziness, light-headedness, or visual disturbances     Notify your health care provider if you experience any of the following:  severe persistent headache     Notify your health care provider if you experience any of the following:   increased confusion or weakness     Remove dressing in 48 hours     Case Request Endoscopy: Biopsy-bone marrow     Order Specific Question Answer Comments   Priority? Normal [2]    Medical Necessity: Medically Urgent [101]    Is an on-site pathologist required for this procedure? N/A    Is the patient currently on anticoagulants and/or antiplatelets? No      Activity as tolerated     Medications:  Reconciled Home Medications:      Medication List        START taking these medications      acyclovir 800 MG Tab  Commonly known as: ZOVIRAX  Take 1 tablet (800 mg total) by mouth 2 (two) times daily.     loperamide 2 mg capsule  Commonly known as: IMODIUM  Take 1 capsule (2 mg total) by mouth 4 (four) times daily as needed for Diarrhea.     potassium chloride SA 20 MEQ tablet  Commonly known as: K-DUR,KLOR-CON  Take 2 tablets (40 mEq total) by mouth once daily. for 7 days     sulfamethoxazole-trimethoprim 800-160mg 800-160 mg Tab  Commonly known as: BACTRIM DS  Take 1 tablet by mouth 3 (three) times a week.  Start taking on: May 8, 2025            CHANGE how you take these medications      aspirin 81 MG EC tablet  Commonly known as: ECOTRIN  Take 1 tablet (81 mg total) by mouth once daily. HOLD UNTIL INSTRUCTED TO RESTART  What changed: additional instructions            CONTINUE taking these medications      b complex vitamins capsule  Take 1 capsule by mouth once daily.     DULoxetine 20 MG capsule  Commonly known as: CYMBALTA  Take 20 mg by mouth.     levothyroxine 88 MCG tablet  Commonly known as: SYNTHROID  Take 88 mcg by mouth before breakfast.     multivitamin per tablet  Commonly known as: THERAGRAN  Take 1 tablet by mouth once daily.     ondansetron 8 MG Tbdl  Commonly known as: ZOFRAN-ODT  Take 8 mg by mouth every 6 (six) hours as needed (Nausea/Vomiting).     pantoprazole 40 MG tablet  Commonly known as: PROTONIX  Take 1 tablet (40 mg total) by mouth once daily.     pregabalin 25 MG capsule  Commonly known as:  LYRICA  Take 75 mg by mouth every evening.     promethazine 25 MG tablet  Commonly known as: PHENERGAN  Take 25 mg by mouth 2 (two) times daily as needed.     rosuvastatin 20 MG tablet  Commonly known as: CRESTOR  Take 20 mg by mouth every evening.     sildenafiL 100 MG tablet  Commonly known as: VIAGRA  Take 45 mg by mouth daily as needed for Erectile Dysfunction.     tiZANidine 4 MG tablet  Commonly known as: ZANAFLEX  Take 4 mg by mouth nightly as needed.     vitamin D 1000 units Tab  Commonly known as: VITAMIN D3  Take 1,000 Units by mouth once daily.            STOP taking these medications      HYDROcodone-acetaminophen  mg per tablet  Commonly known as: NORCO     meloxicam 15 MG tablet  Commonly known as: MOBIC     valACYclovir 500 MG tablet  Commonly known as: VALTREX              Dulce Maria Saul NP  Bone Marrow Transplant  [facility] - Oncology ([facility])

## 2025-04-22 NOTE — PT/OT/SLP PROGRESS
Occupational Therapy      Patient Name:  Mitchel Joya   MRN:  [id number]    Patient not seen today secondary to  (pt with discharge orders in chart.  Will see 4/23 if not discharged from hospital on this date as indicated.).   4/22/2025

## 2025-04-22 NOTE — ASSESSMENT & PLAN NOTE
- Body mass index is 32 kg/m². Morbid obesity complicates all aspects of disease management from diagnostic modalities to treatment.

## 2025-04-22 NOTE — CONSULTS
Permacath Removal Procedure Note         Procedure:   Right permacath removal     Date: 04/22/2025     Location: right internal jugular vein     Anesthesia: Local       Procedure:  After verbal consent and timeout was performed. Patient was placed supine. The dressing was removed from the right chest. Permacath cuff was freed with gentle traction and catheter was removed without any resistance. Dressings were applied. Patient tolerated the procedure well. Pressure was held at the IJ insertion site for 5 minute.      Complications/Comments:  none     Estimated Blood Loss: None

## 2025-04-22 NOTE — ASSESSMENT & PLAN NOTE
Patient of Dr. Hemphill  - Coordinator: Xochilt Diaz  - Conditioning Regimen: Melphalan 200 mg/m2  - Cell Dose: 5-10 x 10^6 CD34/kg  - Maintenance: Korina+Rev  - Caregiver: Partner   - Housing: [facility]   - Admitted 4/6/25 for Jennifer 200 Auto. Today is Day +14.   - engrafted today 4/21 with ANC of 2345. Day 2 of engraftment   - Stem cell infusion completed  4/08/25 at 11:30. Received 5 bags with a total CD34 dose of 4.39 x10^6/kg, tolerated without issue    Planned conditioning regimen:  Melphalan 200 on Day -1     Antimicrobial Prophylaxis:  Acyclovir starting on Day -1  Levofloxacin starting on Day -1  Fluconazole starting on Day -1  Bactrim starting on Day +30     Growth Factor Support:  Neupogen starting on Day +7

## 2025-04-22 NOTE — ASSESSMENT & PLAN NOTE
- Zofran to ATC on 4/15  - has PRN Compazine available  Controlled on current regimen, changed Zofran back to PRN 4/21

## 2025-04-22 NOTE — PROGRESS NOTES
BMT Pharmacist Discharge Note     All discharge medications were reviewed with the patient and his caregiver. 3 medications were sent to the [facility] pharmacy for bedside delivery: acyclovir, potassium chloride, and Bactrim DS.     Medication  Indication  Morning  Afternoon  Evening/Night    **Acyclovir 800mg   Viral infection prevention  1 tablet    1 tablet    **Sulfamethoxazole-trimethoprim (Bactrim DS) 800-160mg  PJP infection prevention   (to start on 05/09/25)  1 tablet on Mondays, Wednesdays, and Fridays    **Potassium Chloride (K-Dur) 20 meq Potassium Supplement (x7 days) 2 tablets     Duloxetine (Cymbalta) 20 mg Nerve Pain 1 capsule     Levothyroxine (Synthroid) 88 mcg Thyoird 1 tablet     Pantoprazole (Protonix) 40 mg Acid Reflux 1 tablet     Pregabalin (Lyrica) 25 mg Nerve Pain    3 capsules (75 mg)    Rosuvastatin (Crestor) 20 mg Cholesterol     1 tablet   Vitamin B Complex Supplement 1 capsule     Multivitamin Supplement 1 tablet      Vitamin D 1000 units Supplement 1 tablet       **new medication or change in medication at discharge      AS NEEDED MEDICATIONS:   **Loperamide (Imodium) 2mg four times a day as needed for diarrhea   Ondansetron (Zofran) 8mg every 8 hours as needed for nausea   Promethazine (Phenergan) 25 mg twice daily as needed for nausea.   Tizanidine 4 mg tablet nightly as needed for muscle spasms.        STOP UNTIL TOLD TO RESTART:     Aspirin      NO LONGER TAKE:     Hydrocodone-acetaminophen (Norco)  Meloxicam (Mobic)  Valacyclovir        Notes:   I discussed the importance of taking acyclovir up until day +365 to prevent viral infections, and taking Bactrim DS starting day +30 for 6 months after transplant to prevent PJP infections. Also advised patient to take all electrolyte replacements as prescribed until the bottle is completed. Patient is aware to continue holding his aspirin on discharge until instructed ok to restart by his provider due to his platelet count. Patient is also  aware to avoid OTC pain medications, such as APAP/NSAIDs, and his home meloxicam and Norco due to increased risk of bleeding and fever masking.     Reviewed that pharmacy is available during their BMT provider appointments in the clinic should they elect to have assistance with pill box fills after discharge.        The patient had the opportunity to ask questions and express concerns. All questions were answered.      Janessa Araujo, PharmD  Clinical Pharmacist-BMT/Hematology  [facility]

## 2025-04-22 NOTE — PLAN OF CARE
Day +14  Auto SCT. No acute events occurred during the shift. Magnesium and Potassium replaced. No complains of pain or nausea. Pt ambulates in room and to bathroom independently. No difficulty voiding. VS have been stable throughout shift and pt is afebrile. Pt AOx4. POC reviewed with patient and he verbalized an understanding. Questions encouraged and answered. Bed is in the lowest position and locked. Non skid socks worn. Call light within reach. Pt encouraged to call for assistance when needed.

## 2025-04-23 LAB
BACTERIA BLD CULT: NORMAL
BACTERIA BLD CULT: NORMAL

## 2025-04-23 NOTE — PROGRESS NOTES
Section of Hematology and Stem Cell Transplantation  New Patient Consult     Date of visit: 4/25/25  Referred by:  Mark Saravia MD  Reason for visit: Autologous stem cell transplant candidate    Oncologic History:     Primary Oncologic Diagnosis: IgG lambda multiple myeloma, standard risk, R-ISS stage  NA  Presentation: Anemia  Initial workup:  Labs:  Hemoglobin: NA  Creatinine: NA  Calcium: NA  Serologic studies:  POOJA: IgG lambda and IgG kappa  SPEP: M spike of 3.2  Free light chains: Ratio >400  Immunoglobulins: NA  PET/CT:  10/15/24: PET CT with widely metastatic lytic bone lesions in the left scapula, right glenoid, left posterior 5th rib, right posterior 8th rib, and patchy lesions throughout spine and pelvis   Bone marrow biopsy:  10/14/24: Sparse maturing myelopoiesis and erythropoiesis. 95% plasma cells.   FISH: Clonal plasma cell population (42%) with aberrant expression of CD56 and , cytoplasmic lambda light chain restriction. Gain of chromosome 9, 15, 11q. Gain of 1q21. 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  SPEP with 0.16 g/dL and 0.12 g/dL. FLCs normal.   PET/CT (3/7/25) - no new hypermetabolic lesions.  Hospital Course: Admitted for Jennifer 200 Auto transplant for Multiple Myeloma. Received 4.39x10^6 CD34 stem cells (5 bags) on 4/8/25. Tolerated stem cell infusion without issue. Experienced expected side effects of nausea, ultimately controlled with ATC Zofran and PRN Compazine. Diarrhea C.diff negative and treated with ATC Imodium and ATC lomotil. Neutropenic fever, negative infectious work-up and treated with empiric  Cefepime. Engrafted on day +13. Discharged to [facility] after Vascath removal and discharge teaching completed. BMT clinic follow-up scheduled 4/25.     History of Present Ilness:   Mitchel Couch) is a pleasant 65 y.o.male with multiple myeloma who presents for his first follow up visit following autoSCT, now day +17. He is here with his caregiver and reports that he feels weak. He still has some mixed stool and intermittent nausea, prn anti-emetics help. He is drinking better than he is eating. He has chronic peripheral neuropathy (numbness, burning pain) in his feet, worse at night and started pregabalin prior to transplant. Denies fever, chills, chest pain, shortness of breath.       PAST MEDICAL HISTORY:   Past Medical History:   Diagnosis Date    Coronary artery disease     Hyperlipidemia        PAST SURGICAL HISTORY:   Past Surgical History:   Procedure Laterality Date    BONE MARROW BIOPSY Right 3/5/2025    Procedure: Biopsy-bone marrow;  Surgeon: Kevin Hemphill MD;  Location: [facility] ([address] [place] [address]);  Service: Oncology;  Laterality: Right;    CHOLECYSTECTOMY      heart stint      KNEE SURGERY      left    PLACEMENT OF DUAL-LUMEN VASCULAR CATHETER Right 3/28/2025    Procedure: INSERTION-CATHETER-MONA: Double Lumen, Bard 14.5 Fr. Hemosplit Cath, Model# [id number], Right vs Left;  Surgeon: Miguel Lawson MD;  Location: [facility] OR [address] [place] [address];  Service: General;  Laterality: Right;    SHOULDER SURGERY      SPINE SURGERY         PAST SOCIAL HISTORY:  Social History     Tobacco Use    Smoking status: Never    Smokeless tobacco: Never   Substance Use Topics    Alcohol use: Yes     Comment: occ    Drug use: Never       FAMILY HISTORY:  Family History   Problem Relation Name Age of Onset    No Known Problems Mother      Heart disease Father         CURRENT MEDICATIONS:   Current Outpatient Medications   Medication Sig    acyclovir (ZOVIRAX) 800 MG Tab Take 1 tablet (800 mg total) by mouth 2 (two) times daily.     aspirin (ECOTRIN) 81 MG EC tablet Take 1 tablet (81 mg total) by mouth once daily. HOLD UNTIL INSTRUCTED TO RESTART    b complex vitamins capsule Take 1 capsule by mouth once daily.    DULoxetine (CYMBALTA) 20 MG capsule Take 20 mg by mouth.    levothyroxine (SYNTHROID) 88 MCG tablet Take 88 mcg by mouth before breakfast.    loperamide (IMODIUM) 2 mg capsule Take 1 capsule (2 mg total) by mouth 4 (four) times daily as needed for Diarrhea.    multivitamin (THERAGRAN) per tablet Take 1 tablet by mouth once daily.    ondansetron (ZOFRAN-ODT) 8 MG TbDL Take 8 mg by mouth every 6 (six) hours as needed (Nausea/Vomiting).    pantoprazole (PROTONIX) 40 MG tablet Take 1 tablet (40 mg total) by mouth once daily.    potassium chloride SA (K-DUR,KLOR-CON) 20 MEQ tablet Take 2 tablets (40 mEq total) by mouth once daily. for 7 days    pregabalin (LYRICA) 75 MG capsule Take 1 capsule (75 mg total) by mouth every evening.    promethazine (PHENERGAN) 25 MG tablet Take 25 mg by mouth 2 (two) times daily as needed.    rosuvastatin (CRESTOR) 20 MG tablet Take 20 mg by mouth every evening.    sildenafiL (VIAGRA) 100 MG tablet Take 45 mg by mouth daily as needed for Erectile Dysfunction.    [START ON 5/8/2025] sulfamethoxazole-trimethoprim 800-160mg (BACTRIM DS) 800-160 mg Tab Take 1 tablet by mouth 3 (three) times a week.    tiZANidine (ZANAFLEX) 4 MG tablet Take 4 mg by mouth nightly as needed.    vitamin D (VITAMIN D3) 1000 units Tab Take 1,000 Units by mouth once daily.     No current facility-administered medications for this visit.       ALLERGIES:   Review of patient's allergies indicates:  No Known Allergies    Review of Systems:     Pertinent positives and negatives included in the HPI. Otherwise a complete review of systems is negative.    Physical Exam:     Vitals:    04/25/25 0759   BP: 92/62   Pulse: 79   Resp: 18   Temp: 98.5 °F (36.9 °C)       Physical Exam  Vitals reviewed.   Constitutional:       General: He is not in  acute distress.     Appearance: Normal appearance. He is not ill-appearing.   HENT:      Head: Normocephalic and atraumatic.      Nose: No congestion or rhinorrhea.      Mouth/Throat:      Mouth: Mucous membranes are moist.      Pharynx: Oropharynx is clear. No oropharyngeal exudate.   Eyes:      Pupils: Pupils are equal, round, and reactive to light.   Cardiovascular:      Rate and Rhythm: Normal rate and regular rhythm.      Pulses: Normal pulses.      Heart sounds: Normal heart sounds. No murmur heard.  Pulmonary:      Effort: Pulmonary effort is normal.      Breath sounds: Normal breath sounds. No wheezing.   Chest:      Comments: Right chest central line healing.  Abdominal:      General: Bowel sounds are normal. There is no distension.      Palpations: Abdomen is soft.      Tenderness: There is no abdominal tenderness.   Musculoskeletal:         General: Normal range of motion.      Cervical back: Normal range of motion and neck supple.      Right lower leg: No edema.      Left lower leg: No edema.   Skin:     General: Skin is warm and dry.      Findings: No bruising, lesion or rash.   Neurological:      Mental Status: He is alert and oriented to person, place, and time.      Motor: No weakness.   Psychiatric:         Mood and Affect: Mood normal.         Thought Content: Thought content normal.          ECOG Performance Status: (foot note - ECOG PS provided by [employer]cology [employer]) 1 - Symptomatic but completely ambulatory    Karnofsky Performance Score:  90%- Able to Carry on Normal Activity: Minor Symptoms of Disease      Labs:   Lab Results   Component Value Date    WBC 5.14 04/25/2025    RBC 3.70 (L) 04/25/2025    HGB 10.8 (L) 04/25/2025    HCT 33.9 (L) 04/25/2025    MCV 92 04/25/2025    MCH 29.2 04/25/2025    MCHC 31.9 (L) 04/25/2025    RDW 18.0 (H) 04/25/2025     (L) 04/25/2025    MPV 12.1 04/25/2025    GRAN 6.4 04/22/2025    LYMPH 14.0 (L) 04/25/2025    LYMPH 0.72 (L) 04/25/2025     MONO 19.3 (H) 04/25/2025    MONO 0.99 04/25/2025    EOS 0.0 04/25/2025    EOS 0.00 04/25/2025    BASO 0.05 03/10/2025    EOSINOPHIL 13.3 (H) 04/15/2025    BASOPHIL 1.2 04/25/2025    BASOPHIL 0.06 04/25/2025       CMP  Sodium   Date Value Ref Range Status   04/25/2025 137 136 - 145 mmol/L Final   03/10/2025 136 136 - 145 mmol/L Final     Potassium   Date Value Ref Range Status   04/25/2025 4.1 3.5 - 5.1 mmol/L Final   03/10/2025 3.5 3.5 - 5.1 mmol/L Final     Chloride   Date Value Ref Range Status   04/25/2025 104 95 - 110 mmol/L Final   03/10/2025 102 95 - 110 mmol/L Final     CO2   Date Value Ref Range Status   04/25/2025 24 23 - 29 mmol/L Final   03/10/2025 27 23 - 29 mmol/L Final     Glucose   Date Value Ref Range Status   03/10/2025 101 70 - 110 mg/dL Final     BUN   Date Value Ref Range Status   04/25/2025 6 (L) 8 - 23 mg/dL Final     Creatinine   Date Value Ref Range Status   04/25/2025 0.7 0.5 - 1.4 mg/dL Final     Calcium   Date Value Ref Range Status   04/25/2025 9.2 8.7 - 10.5 mg/dL Final   03/10/2025 9.4 8.7 - 10.5 mg/dL Final     Total Protein   Date Value Ref Range Status   03/10/2025 6.8 6.0 - 8.4 g/dL Final     Albumin   Date Value Ref Range Status   04/25/2025 3.1 (L) 3.5 - 5.2 g/dL Final   03/10/2025 3.7 3.5 - 5.2 g/dL Final     Total Bilirubin   Date Value Ref Range Status   03/10/2025 0.3 0.1 - 1.0 mg/dL Final     Comment:     For infants and newborns, interpretation of results should be based  on gestational age, weight and in agreement with clinical  observations.    Premature Infant recommended reference ranges:  Up to 24 hours.............<8.0 mg/dL  Up to 48 hours............<12.0 mg/dL  3-5 days..................<15.0 mg/dL  6-29 days.................<15.0 mg/dL       Bilirubin Total   Date Value Ref Range Status   04/25/2025 0.2 0.1 - 1.0 mg/dL Final     Comment:     For infants and newborns, interpretation of results should be based   on gestational age, weight and in agreement with  clinical   observations.    Premature Infant recommended reference ranges:   0-24 hours:  <8.0 mg/dL   24-48 hours: <12.0 mg/dL   3-5 days:    <15.0 mg/dL   6-29 days:   <15.0 mg/dL     Alkaline Phosphatase   Date Value Ref Range Status   03/10/2025 84 40 - 150 U/L Final     ALP   Date Value Ref Range Status   04/25/2025 98 40 - 150 unit/L Final     AST   Date Value Ref Range Status   04/25/2025 24 11 - 45 unit/L Final   03/10/2025 27 10 - 40 U/L Final     ALT   Date Value Ref Range Status   04/25/2025 31 10 - 44 unit/L Final   03/10/2025 48 (H) 10 - 44 U/L Final     Anion Gap   Date Value Ref Range Status   04/25/2025 9 8 - 16 mmol/L Final     Comment:     UNABLE TO CALCULATE       Imaging:  Reviewed     Pathology:  Reviewed        Assessment and Plan:   Mitchel Joya (Mitchel) is a pleasant 65 y.o.male with multiple myeloma who presents for follow up.    Autologous stem cell transplant candidate:  - Conditioning Regimen: Melphalan 200 mg/m2 on 4/8/25.  - Cell Dose: Received 4.39x10^6 CD34 stem cells (5 bags)  - Today is day 17.  - Plan for day +100 restaging on 7/16/25 and follow up with Dr. Hemphill 1 week later.  - Maintenance: Korina+Rev    Multiple myeloma, standard risk, R-ISS stage  NA :  His oncologic history is detailed above. He remains on treatment with Korina VRd. He achieved VGPR with induction pre-transplant. Treament held in anticipation of collection and transplant.  - See autoSCT above.    Immunodeficiency due to conditions classified elsewhere:  - Continue valacyclovir for shingles prophylaxis  - Plan for bactrim MWF on day +30 through day +180  - Plan for referral to infectious disease for post-transplant vaccination at day +100.    Anemia/Thrombocytopenia  - Secondary to pre-transplant chemotherapy  - Improving post-transplant    Hypothyroidism  - Continue synthroid 88 mcg prior to breakfast daily    Chemotherapy Induced Neuropathy  - Lyrica 75 mg nightly has seen mild improvement  - Duloxetine 20 mg  daily, patient hasn't started medication      Orders/Follow Up:      Orders Placed This Encounter    pregabalin (LYRICA) 75 MG capsule       Route Chart for Scheduling    BMT Chart Routing      Follow up with physician    Follow up with AXEL . As scheduled with me on 4/30   Provider visit type    Infusion scheduling note    Injection scheduling note    Labs   Scheduling:  Preferred lab:  Lab interval:  As scheduled   Imaging    Pharmacy appointment    Other referrals                       Total time of this visit was 40 minutes, including time spent face to face with patient and/or via video/audio, and also in preparing for today's visit for MDM and documentation. (Medical Decision Making, including consideration of possible diagnoses, management options, complex medical record review, review of diagnostic tests and information, consideration and discussion of significant complications based on comorbidities, and discussion with providers involved with the care of the patient). Greater than 50% was spent face to face with the patient counseling and coordinating care.    Visit today included increased complexity associated with the care of the episodic problem fatigue addressed and managing the longitudinal care of the patient due to the serious and/or complex managed problem(s) multiple myeloma, s/p autologous stem cell transplant.     La Nena Pastrana, RADHAP-AQUILES  Hematologic Malignancies, Stem Cell Transplantation, and Cellular Therapy  [facility]

## 2025-04-24 NOTE — PROGRESS NOTES
The patient location is: [place]  The chief complaint leading to consultation is: post SCT    Visit type: audio only    Call time with patient: 30  45 minutes of total time spent on the encounter, which includes face to face time and non-face to face time preparing to see the patient (eg, review of tests), Obtaining and/or reviewing separately obtained history, Documenting clinical information in the electronic or other health record, Independently interpreting results (not separately reported) and communicating results to the patient/family/caregiver, or Care coordination (not separately reported).     Each patient to whom he or she provides medical services by telemedicine is:  (1) informed of the relationship between the physician and patient and the respective role of any other health care provider with respect to management of the patient; and (2) notified that he or she may decline to receive medical services by telemedicine and may withdraw from such care at any time.    Notes:  Oncology Nutrition Assessment   Medical Nutrition Therapy Post Stem Cell Transplant    Mitchel Joya  1960    Transplant History:   Primary oncologist: Dr. Beltrán/Dr. Hemphill  Primary oncologic diagnosis: MM  Transplant type and date: Autologous SCT on 04/08/25  Conditioning Regimen: Jennifer 200   Immediate post-transplant complications: nausea, ultimately controlled with ATC Zofran and PRN Compazine. Diarrhea C.diff negative and treated with ATC Imodium and ATC lomotil. Neutropenic fever, negative infectious work-up and treated with empiric Cefepime. Engrafted on day +13.     Social History:   Caregiver: partner and children  Post-transplant discharge plans: [facility] Pre-Transplant Nutrition History:   Appetite/Intake:  Regular Diet - aiming to eat higher fiber and protein. Eating 3 meals plus snacks daily. No ONS.   Cultural/Spiritual/Personal Preferences: No Preferences    Living Situation: lives with partner  Who: Shops for  "Groceries? Patient and partner  Who: Prepare meals? Patient and partner  Eating out: 0-1x/week.  Allergies: Patient has no known allergies.     PMHx: Past Medical History[1]     Nutrition Assessment    Anthropometrics:   Weight:   Wt Readings from Last 10 Encounters:   04/22/25 98.3 kg (216 lb 11.4 oz)   04/04/25 101.1 kg (222 lb 14.2 oz)   04/01/25 98.8 kg (217 lb 13 oz)   03/31/25 98.8 kg (217 lb 13 oz)   03/30/25 98.8 kg (217 lb 13 oz)   03/28/25 98.9 kg (218 lb)   03/24/25 99.7 kg (219 lb 12.8 oz)   03/10/25 99 kg (218 lb 4.1 oz)   03/05/25 99.8 kg (220 lb)   01/30/25 105 kg (231 lb 7.7 oz)                         Ht Readings from Last 1 Encounters:   04/21/25 5' 9" (1.753 m)      BMI Readings from Last 1 Encounters:   04/22/25 32.00 kg/m²     Estimated body surface area is 2.19 meters squared as calculated from the following:    Height as of 4/21/25: 5' 9" (1.753 m).    Weight as of 4/22/25: 98.3 kg (216 lb 11.4 oz).         Post-Transplant Nutrition History:   Appetite: slowly improving  Intake: Ate 1 large bowl of special k with blueberries and chicken stew for dinner yesterday. Drinking 32 oz of water and tea.     Encounter Notes:  Mitchel Joya is a 65 y.o. male being seen for nutrition follow up s/p Autologous SCT. Patient presents to audio only visit alone. Weight loss of 15 lbs in 3 months noted.  Likely related to increase intake . Noted that patient did not feel well following large breakfast. He is aiming to increase intake of small frequent meals. Reports nausea/headache with smells. C/o decreased stamina when having to stand too long. Current nutrition impact symptoms listed below.     Nutrition Impact Symptoms Post SCT   [] No nutritional concerns at current  [] Poor Appetite   [] Weight loss   [x] Nausea - Zofran helps  [] Vomiting   [] Diarrhea   [] Constipation - with pain medication used for neuropathy.   [x] Early Satiety [x] Change in smell   [] Change in taste   [] Dry Mouth   [] Thick " saliva   [] Dysphagia   [] Difficulty chewing  [] Mucositis  [] Indigestion  [] Gas/Bloating  [] Reflux      [x] Fatigue     [] other, please specify- history of neuropathy      Current Medications:  Current Outpatient Medications   Medication Instructions    acyclovir (ZOVIRAX) 800 mg, Oral, 2 times daily    aspirin (ECOTRIN) 81 mg, Oral, Daily, HOLD UNTIL INSTRUCTED TO RESTART    b complex vitamins capsule 1 capsule, Daily    DULoxetine (CYMBALTA) 20 mg    levothyroxine (SYNTHROID) 88 mcg, Before breakfast    loperamide (IMODIUM) 2 mg, Oral, 4 times daily PRN    multivitamin (THERAGRAN) per tablet 1 tablet, Daily    ondansetron (ZOFRAN-ODT) 8 mg, Every 6 hours PRN    pantoprazole (PROTONIX) 40 mg, Oral, Daily    potassium chloride SA (K-DUR,KLOR-CON) 20 MEQ tablet 40 mEq, Oral, Daily    pregabalin (LYRICA) 75 mg, Nightly    promethazine (PHENERGAN) 25 mg, 2 times daily PRN    rosuvastatin (CRESTOR) 20 mg, Nightly    sildenafiL (VIAGRA) 45 mg, Daily PRN    [START ON 5/8/2025] sulfamethoxazole-trimethoprim 800-160mg (BACTRIM DS) 800-160 mg Tab 1 tablet, Oral, Three times weekly    tiZANidine (ZANAFLEX) 4 mg, Oral, Nightly PRN    vitamin D (VITAMIN D3) 1,000 Units, Daily     Labs: Reviewed 04/25/25: gluc 146, bun 6, alb 3.1    Nutrition Diagnosis    Nutrition Problem: Unintended weight loss  Etiology (related to): inadequate energy intake and increased energy needs post sct  Signs/Symptoms (as evidenced by): weight loss of 15 lbs in 3 months    Nutrition Intervention    Nutrition Prescription  2375 kcals/day  25 kcal/kg    g protein/day 1-1.2 gm/kg  2375 mL fluid/day 1 ml/kcal    Recommendations:   Encouraged small frequent meals (minimum of 6 bites per meal). Aim for 5-6 mini meals daily or eating every 2 hours while wake. To prevent early satiety.   Encouraged increase fluid intake of water, juice, electrolyte drinks, etc.   Recommend increasing protein intake to a minimum of 3 times daily.     Materials  Provided/Reviewed: none    Nutrition Monitoring and Evaluation    Monitor: energy intake, weight, diet education needs , and symptom management     Follow up: Patient will follow up via portal as needed with any questions/concerns     Communication to referring provider/care team: Note available in chart.     Consultation Time: 30 Minutes    Yoni GEROGEAP, , LDN  [facility] in Clinical Nutrition  Board Certified Specialist in Oncology Nutrition   [facility]  [phone]        [1]   Past Medical History:  Diagnosis Date    Coronary artery disease     Hyperlipidemia

## 2025-04-25 ENCOUNTER — OFFICE VISIT (OUTPATIENT)
Dept: HEMATOLOGY/ONCOLOGY | Facility: CLINIC | Age: 65
End: 2025-04-25
Payer: COMMERCIAL

## 2025-04-25 ENCOUNTER — LAB VISIT (OUTPATIENT)
Dept: LAB | Facility: HOSPITAL | Age: 65
End: 2025-04-25
Attending: INTERNAL MEDICINE
Payer: COMMERCIAL

## 2025-04-25 VITALS
HEIGHT: 69 IN | WEIGHT: 210.44 LBS | TEMPERATURE: 99 F | RESPIRATION RATE: 18 BRPM | BODY MASS INDEX: 31.17 KG/M2 | HEART RATE: 79 BPM | OXYGEN SATURATION: 99 % | SYSTOLIC BLOOD PRESSURE: 92 MMHG | DIASTOLIC BLOOD PRESSURE: 62 MMHG

## 2025-04-25 DIAGNOSIS — D84.81 IMMUNODEFICIENCY DUE TO CONDITIONS CLASSIFIED ELSEWHERE: ICD-10-CM

## 2025-04-25 DIAGNOSIS — C90.01 MULTIPLE MYELOMA IN REMISSION: ICD-10-CM

## 2025-04-25 DIAGNOSIS — D69.6 THROMBOCYTOPENIA: ICD-10-CM

## 2025-04-25 DIAGNOSIS — G62.0 CHEMOTHERAPY-INDUCED NEUROPATHY: ICD-10-CM

## 2025-04-25 DIAGNOSIS — Z76.82 STEM CELL TRANSPLANT CANDIDATE: ICD-10-CM

## 2025-04-25 DIAGNOSIS — D63.0 ANEMIA IN NEOPLASTIC DISEASE: ICD-10-CM

## 2025-04-25 DIAGNOSIS — Z94.84 HISTORY OF AUTOLOGOUS STEM CELL TRANSPLANT: Primary | ICD-10-CM

## 2025-04-25 DIAGNOSIS — T45.1X5A CHEMOTHERAPY-INDUCED NEUROPATHY: ICD-10-CM

## 2025-04-25 DIAGNOSIS — C90.00 MULTIPLE MYELOMA NOT HAVING ACHIEVED REMISSION: ICD-10-CM

## 2025-04-25 LAB
ABSOLUTE EOSINOPHIL (OHS): 0 K/UL
ABSOLUTE MONOCYTE (OHS): 0.99 K/UL (ref 0.3–1)
ABSOLUTE NEUTROPHIL COUNT (OHS): 3.22 K/UL (ref 1.8–7.7)
ALBUMIN SERPL BCP-MCNC: 3.1 G/DL (ref 3.5–5.2)
ALP SERPL-CCNC: 98 UNIT/L (ref 40–150)
ALT SERPL W/O P-5'-P-CCNC: 31 UNIT/L (ref 10–44)
ANION GAP (OHS): 9 MMOL/L (ref 8–16)
AST SERPL-CCNC: 24 UNIT/L (ref 11–45)
BASOPHILS # BLD AUTO: 0.06 K/UL
BASOPHILS NFR BLD AUTO: 1.2 %
BILIRUB SERPL-MCNC: 0.2 MG/DL (ref 0.1–1)
BUN SERPL-MCNC: 6 MG/DL (ref 8–23)
CALCIUM SERPL-MCNC: 9.2 MG/DL (ref 8.7–10.5)
CHLORIDE SERPL-SCNC: 104 MMOL/L (ref 95–110)
CO2 SERPL-SCNC: 24 MMOL/L (ref 23–29)
CREAT SERPL-MCNC: 0.7 MG/DL (ref 0.5–1.4)
ERYTHROCYTE [DISTWIDTH] IN BLOOD BY AUTOMATED COUNT: 18 % (ref 11.5–14.5)
GFR SERPLBLD CREATININE-BSD FMLA CKD-EPI: >60 ML/MIN/1.73/M2
GLUCOSE SERPL-MCNC: 146 MG/DL (ref 70–110)
HCT VFR BLD AUTO: 33.9 % (ref 40–54)
HGB BLD-MCNC: 10.8 GM/DL (ref 14–18)
IMM GRANULOCYTES # BLD AUTO: 0.15 K/UL (ref 0–0.04)
IMM GRANULOCYTES NFR BLD AUTO: 2.9 % (ref 0–0.5)
INDIRECT COOMBS: NORMAL
INR PPP: 1.1 (ref 0.8–1.2)
LYMPHOCYTES # BLD AUTO: 0.72 K/UL (ref 1–4.8)
MAGNESIUM SERPL-MCNC: 1.9 MG/DL (ref 1.6–2.6)
MCH RBC QN AUTO: 29.2 PG (ref 27–31)
MCHC RBC AUTO-ENTMCNC: 31.9 G/DL (ref 32–36)
MCV RBC AUTO: 92 FL (ref 82–98)
NUCLEATED RBC (/100WBC) (OHS): 0 /100 WBC
PHOSPHATE SERPL-MCNC: 2.7 MG/DL (ref 2.7–4.5)
PLATELET # BLD AUTO: 112 K/UL (ref 150–450)
PLATELET BLD QL SMEAR: ABNORMAL
PMV BLD AUTO: 12.1 FL (ref 9.2–12.9)
POTASSIUM SERPL-SCNC: 4.1 MMOL/L (ref 3.5–5.1)
PROT SERPL-MCNC: 6.2 GM/DL (ref 6–8.4)
PROTHROMBIN TIME: 11.9 SECONDS (ref 9–12.5)
RBC # BLD AUTO: 3.7 M/UL (ref 4.6–6.2)
RELATIVE EOSINOPHIL (OHS): 0 %
RELATIVE LYMPHOCYTE (OHS): 14 % (ref 18–48)
RELATIVE MONOCYTE (OHS): 19.3 % (ref 4–15)
RELATIVE NEUTROPHIL (OHS): 62.6 % (ref 38–73)
RH BLD: NORMAL
SODIUM SERPL-SCNC: 137 MMOL/L (ref 136–145)
SPECIMEN OUTDATE: NORMAL
WBC # BLD AUTO: 5.14 K/UL (ref 3.9–12.7)

## 2025-04-25 PROCEDURE — 99999 PR PBB SHADOW E&M-EST. PATIENT-LVL V: CPT | Mod: PBBFAC,,,

## 2025-04-25 PROCEDURE — 86901 BLOOD TYPING SEROLOGIC RH(D): CPT | Performed by: INTERNAL MEDICINE

## 2025-04-25 PROCEDURE — 83735 ASSAY OF MAGNESIUM: CPT

## 2025-04-25 PROCEDURE — 85025 COMPLETE CBC W/AUTO DIFF WBC: CPT

## 2025-04-25 PROCEDURE — 80053 COMPREHEN METABOLIC PANEL: CPT

## 2025-04-25 PROCEDURE — 85610 PROTHROMBIN TIME: CPT

## 2025-04-25 PROCEDURE — 84100 ASSAY OF PHOSPHORUS: CPT

## 2025-04-25 PROCEDURE — 36415 COLL VENOUS BLD VENIPUNCTURE: CPT

## 2025-04-25 RX ORDER — PREGABALIN 75 MG/1
75 CAPSULE ORAL NIGHTLY
Qty: 30 CAPSULE | Refills: 0 | Status: SHIPPED | OUTPATIENT
Start: 2025-04-25

## 2025-04-25 NOTE — PROGRESS NOTES
Section of Hematology and Stem Cell Transplantation  New Patient Consult     Date of visit: 4/30/25  Referred by:  Mark Saravia MD  Reason for visit: Autologous stem cell transplant candidate    Oncologic History:     Primary Oncologic Diagnosis: IgG lambda multiple myeloma, standard risk, R-ISS stage  NA  Presentation: Anemia  Initial workup:  Labs:  Hemoglobin: NA  Creatinine: NA  Calcium: NA  Serologic studies:  POOJA: IgG lambda and IgG kappa  SPEP: M spike of 3.2  Free light chains: Ratio >400  Immunoglobulins: NA  PET/CT:  10/15/24: PET CT with widely metastatic lytic bone lesions in the left scapula, right glenoid, left posterior 5th rib, right posterior 8th rib, and patchy lesions throughout spine and pelvis   Bone marrow biopsy:  10/14/24: Sparse maturing myelopoiesis and erythropoiesis. 95% plasma cells.   FISH: Clonal plasma cell population (42%) with aberrant expression of CD56 and , cytoplasmic lambda light chain restriction. Gain of chromosome 9, 15, 11q. Gain of 1q21. 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  SPEP with 0.16 g/dL and 0.12 g/dL. FLCs normal.   PET/CT (3/7/25) - no new hypermetabolic lesions.  Hospital Course: Admitted for Jennifer 200 Auto transplant for Multiple Myeloma. Received 4.39x10^6 CD34 stem cells (5 bags) on 4/8/25. Tolerated stem cell infusion without issue. Experienced expected side effects of nausea, ultimately controlled with ATC Zofran and PRN Compazine. Diarrhea C.diff negative and treated with ATC Imodium and ATC lomotil. Neutropenic fever, negative infectious work-up and treated with empiric  "Cefepime. Engrafted on day +13. Discharged to [facility] after Vascath removal and discharge teaching completed. BMT clinic follow-up scheduled 4/25.     History of Present Ilness:   Mitchel Couch) is a pleasant 65 y.o.male with multiple myeloma who presents for post-autoSCT.    Interval History 04/30/2025:  Patient here for autoSCT follow up, now day +22. He reports that he had a "naked" drink last night and ended up having emesis following and poor sleep as a result. He reports doing a bit better with nutrition and hydration, trying to do frequent small meals. No diarrhea since our last follow up. He has chronic peripheral neuropathy (numbness, burning pain) in his feet, worse at night and started pregabalin prior to transplant. Denies fever, chills, chest pain, shortness of breath.       PAST MEDICAL HISTORY:   Past Medical History:   Diagnosis Date    Coronary artery disease     Hyperlipidemia        PAST SURGICAL HISTORY:   Past Surgical History:   Procedure Laterality Date    BONE MARROW BIOPSY Right 3/5/2025    Procedure: Biopsy-bone marrow;  Surgeon: Kevin Hemphill MD;  Location: [facility] ([address] [place] [address]);  Service: Oncology;  Laterality: Right;    CHOLECYSTECTOMY      heart stint      KNEE SURGERY      left    PLACEMENT OF DUAL-LUMEN VASCULAR CATHETER Right 3/28/2025    Procedure: INSERTION-CATHETER-MONA: Double Lumen, Bard 14.5 Fr. Hemosplit Cath, Model# [id number], Right vs Left;  Surgeon: Miguel Lawson MD;  Location: [facility] OR [address] [place] [address];  Service: General;  Laterality: Right;    SHOULDER SURGERY      SPINE SURGERY         PAST SOCIAL HISTORY:  Social History     Tobacco Use    Smoking status: Never    Smokeless tobacco: Never   Substance Use Topics    Alcohol use: Yes     Comment: occ    Drug use: Never       FAMILY HISTORY:  Family History   Problem Relation Name Age of Onset    No Known Problems Mother      Heart disease Father         CURRENT MEDICATIONS:   Current Outpatient Medications "   Medication Sig    acyclovir (ZOVIRAX) 800 MG Tab Take 1 tablet (800 mg total) by mouth 2 (two) times daily.    aspirin (ECOTRIN) 81 MG EC tablet Take 1 tablet (81 mg total) by mouth once daily. HOLD UNTIL INSTRUCTED TO RESTART    b complex vitamins capsule Take 1 capsule by mouth once daily.    DULoxetine (CYMBALTA) 20 MG capsule Take 20 mg by mouth.    erythromycin (ROMYCIN) ophthalmic ointment SMARTSIG:sparingly In Eye(s) Twice Daily    levothyroxine (SYNTHROID) 88 MCG tablet Take 88 mcg by mouth before breakfast.    loperamide (IMODIUM) 2 mg capsule Take 1 capsule (2 mg total) by mouth 4 (four) times daily as needed for Diarrhea.    multivitamin (THERAGRAN) per tablet Take 1 tablet by mouth once daily.    ondansetron (ZOFRAN-ODT) 8 MG TbDL Take 8 mg by mouth every 6 (six) hours as needed (Nausea/Vomiting).    pregabalin (LYRICA) 75 MG capsule Take 1 capsule (75 mg total) by mouth every evening.    promethazine (PHENERGAN) 25 MG tablet Take 25 mg by mouth 2 (two) times daily as needed.    REVLIMID 20 mg Cap Take by mouth.    rosuvastatin (CRESTOR) 20 MG tablet Take 20 mg by mouth every evening.    sildenafiL (VIAGRA) 100 MG tablet Take 45 mg by mouth daily as needed for Erectile Dysfunction.    [START ON 5/8/2025] sulfamethoxazole-trimethoprim 800-160mg (BACTRIM DS) 800-160 mg Tab Take 1 tablet by mouth 3 (three) times a week.    tiZANidine (ZANAFLEX) 4 MG tablet Take 4 mg by mouth nightly as needed.    vitamin D (VITAMIN D3) 1000 units Tab Take 1,000 Units by mouth once daily.    pantoprazole (PROTONIX) 40 MG tablet Take 1 tablet (40 mg total) by mouth once daily.     No current facility-administered medications for this visit.       ALLERGIES:   Review of patient's allergies indicates:  No Known Allergies    Review of Systems:     Pertinent positives and negatives included in the HPI. Otherwise a complete review of systems is negative.    Physical Exam:     Vitals:    04/30/25 0759   BP: 125/79   Pulse: 105    Resp: 18   Temp: 97.5 °F (36.4 °C)         Physical Exam  Vitals reviewed.   Constitutional:       General: He is not in acute distress.     Appearance: Normal appearance. He is not ill-appearing.   HENT:      Head: Normocephalic and atraumatic.      Nose: No congestion or rhinorrhea.      Mouth/Throat:      Mouth: Mucous membranes are moist.      Pharynx: Oropharynx is clear. No oropharyngeal exudate.   Eyes:      Pupils: Pupils are equal, round, and reactive to light.   Cardiovascular:      Rate and Rhythm: Normal rate and regular rhythm.      Pulses: Normal pulses.      Heart sounds: Normal heart sounds. No murmur heard.  Pulmonary:      Effort: Pulmonary effort is normal.      Breath sounds: Normal breath sounds. No wheezing.   Chest:      Comments: Right chest central line healing.  Abdominal:      General: Bowel sounds are normal. There is no distension.      Palpations: Abdomen is soft.      Tenderness: There is no abdominal tenderness.   Musculoskeletal:         General: Normal range of motion.      Cervical back: Normal range of motion and neck supple.      Right lower leg: No edema.      Left lower leg: No edema.   Skin:     General: Skin is warm and dry.      Findings: No bruising, lesion or rash.   Neurological:      Mental Status: He is alert and oriented to person, place, and time.      Motor: No weakness.   Psychiatric:         Mood and Affect: Mood normal.         Thought Content: Thought content normal.          ECOG Performance Status: (foot note - ECOG PS provided by [employer]) 1 - Symptomatic but completely ambulatory    Karnofsky Performance Score:  90%- Able to Carry on Normal Activity: Minor Symptoms of Disease      Labs:   Lab Results   Component Value Date    WBC 5.63 04/30/2025    RBC 3.80 (L) 04/30/2025    HGB 11.4 (L) 04/30/2025    HCT 34.3 (L) 04/30/2025    MCV 90 04/30/2025    MCH 30.0 04/30/2025    MCHC 33.2 04/30/2025    RDW 19.0 (H) 04/30/2025      04/30/2025    MPV 10.9 04/30/2025    GRAN 6.4 04/22/2025    LYMPH 36.4 04/30/2025    LYMPH 2.05 04/30/2025    MONO 18.7 (H) 04/30/2025    MONO 1.05 (H) 04/30/2025    EOS 0.7 04/30/2025    EOS 0.04 04/30/2025    BASO 0.05 03/10/2025    EOSINOPHIL 13.3 (H) 04/15/2025    BASOPHIL 3.0 (H) 04/30/2025    BASOPHIL 0.17 04/30/2025       CMP  Sodium   Date Value Ref Range Status   04/30/2025 139 136 - 145 mmol/L Final   03/10/2025 136 136 - 145 mmol/L Final     Potassium   Date Value Ref Range Status   04/30/2025 4.2 3.5 - 5.1 mmol/L Final   03/10/2025 3.5 3.5 - 5.1 mmol/L Final     Chloride   Date Value Ref Range Status   04/30/2025 106 95 - 110 mmol/L Final   03/10/2025 102 95 - 110 mmol/L Final     CO2   Date Value Ref Range Status   04/30/2025 23 23 - 29 mmol/L Final   03/10/2025 27 23 - 29 mmol/L Final     Glucose   Date Value Ref Range Status   04/30/2025 125 (H) 70 - 110 mg/dL Final   03/10/2025 101 70 - 110 mg/dL Final     BUN   Date Value Ref Range Status   04/30/2025 9 8 - 23 mg/dL Final     Creatinine   Date Value Ref Range Status   04/30/2025 0.7 0.5 - 1.4 mg/dL Final     Calcium   Date Value Ref Range Status   04/30/2025 9.5 8.7 - 10.5 mg/dL Final   03/10/2025 9.4 8.7 - 10.5 mg/dL Final     Protein Total   Date Value Ref Range Status   04/30/2025 6.5 6.0 - 8.4 gm/dL Final     Total Protein   Date Value Ref Range Status   03/10/2025 6.8 6.0 - 8.4 g/dL Final     Albumin   Date Value Ref Range Status   04/30/2025 3.3 (L) 3.5 - 5.2 g/dL Final   03/10/2025 3.7 3.5 - 5.2 g/dL Final     Total Bilirubin   Date Value Ref Range Status   03/10/2025 0.3 0.1 - 1.0 mg/dL Final     Comment:     For infants and newborns, interpretation of results should be based  on gestational age, weight and in agreement with clinical  observations.    Premature Infant recommended reference ranges:  Up to 24 hours.............<8.0 mg/dL  Up to 48 hours............<12.0 mg/dL  3-5 days..................<15.0 mg/dL  6-29  days.................<15.0 mg/dL       Bilirubin Total   Date Value Ref Range Status   04/30/2025 0.2 0.1 - 1.0 mg/dL Final     Comment:     For infants and newborns, interpretation of results should be based   on gestational age, weight and in agreement with clinical   observations.    Premature Infant recommended reference ranges:   0-24 hours:  <8.0 mg/dL   24-48 hours: <12.0 mg/dL   3-5 days:    <15.0 mg/dL   6-29 days:   <15.0 mg/dL     Alkaline Phosphatase   Date Value Ref Range Status   03/10/2025 84 40 - 150 U/L Final     ALP   Date Value Ref Range Status   04/30/2025 87 40 - 150 unit/L Final     AST   Date Value Ref Range Status   04/30/2025 24 11 - 45 unit/L Final   03/10/2025 27 10 - 40 U/L Final     ALT   Date Value Ref Range Status   04/30/2025 35 10 - 44 unit/L Final   03/10/2025 48 (H) 10 - 44 U/L Final     Anion Gap   Date Value Ref Range Status   04/30/2025 10 8 - 16 mmol/L Final     Comment:     UNABLE TO CALCULATE       Imaging:  Reviewed     Pathology:  Reviewed        Assessment and Plan:   Mitchel Joya (Mitchel) is a pleasant 65 y.o.male with multiple myeloma who presents for follow up.    Autologous stem cell transplant candidate:  - Conditioning Regimen: Melphalan 200 mg/m2 on 4/8/25.  - Cell Dose: Received 4.39x10^6 CD34 stem cells (5 bags)  - Today is day 22.  - Plan for day +100 restaging on 7/16/25 and follow up with Dr. Hemphill 1 week later.  - Maintenance: Korina+Rev    Multiple myeloma, standard risk, R-ISS stage  NA :  His oncologic history is detailed above. He remains on treatment with Korina VRd. He achieved VGPR with induction pre-transplant. Treament held in anticipation of collection and transplant.  - See autoSCT above.    Immunodeficiency due to conditions classified elsewhere:  - Continue valacyclovir for shingles prophylaxis  - Plan for bactrim MWF on day +30 through day +180  - Plan for referral to infectious disease for post-transplant vaccination at day  +100.    Anemia/Thrombocytopenia  - Secondary to pre-transplant chemotherapy  - Improving post-transplant    Hypothyroidism  - Continue synthroid 88 mcg prior to breakfast daily    Chemotherapy Induced Neuropathy  - Lyrica 75 mg nightly has seen mild improvement  - Duloxetine 20 mg daily, patient hasn't started medication      Orders/Follow Up:             Route Chart for Scheduling    BMT Chart Routing      Follow up with physician    Follow up with [facility] . As scheduled with me   Provider visit type    Infusion scheduling note    Injection scheduling note    Labs   Scheduling:  Preferred lab:  Lab interval:  As scheduled prior to follow up   Imaging    Pharmacy appointment    Other referrals                       Total time of this visit was 40 minutes, including time spent face to face with patient and/or via video/audio, and also in preparing for today's visit for MDM and documentation. (Medical Decision Making, including consideration of possible diagnoses, management options, complex medical record review, review of diagnostic tests and information, consideration and discussion of significant complications based on comorbidities, and discussion with providers involved with the care of the patient). Greater than 50% was spent face to face with the patient counseling and coordinating care.    Visit today included increased complexity associated with the care of the episodic problem fatigue addressed and managing the longitudinal care of the patient due to the serious and/or complex managed problem(s) multiple myeloma, s/p autologous stem cell transplant.     La Nena Pastrana, FNP-C  Hematologic Malignancies, Stem Cell Transplantation, and Cellular Therapy  [facility] and [facility]

## 2025-04-29 ENCOUNTER — CLINICAL SUPPORT (OUTPATIENT)
Dept: HEMATOLOGY/ONCOLOGY | Facility: CLINIC | Age: 65
End: 2025-04-29
Payer: COMMERCIAL

## 2025-04-29 DIAGNOSIS — C90.00 MULTIPLE MYELOMA NOT HAVING ACHIEVED REMISSION: ICD-10-CM

## 2025-04-29 DIAGNOSIS — Z94.84 HISTORY OF AUTOLOGOUS STEM CELL TRANSPLANT: ICD-10-CM

## 2025-04-29 DIAGNOSIS — Z71.3 NUTRITIONAL COUNSELING: Primary | ICD-10-CM

## 2025-04-30 ENCOUNTER — OFFICE VISIT (OUTPATIENT)
Dept: HEMATOLOGY/ONCOLOGY | Facility: CLINIC | Age: 65
End: 2025-04-30
Payer: COMMERCIAL

## 2025-04-30 ENCOUNTER — LAB VISIT (OUTPATIENT)
Dept: LAB | Facility: HOSPITAL | Age: 65
End: 2025-04-30
Attending: INTERNAL MEDICINE
Payer: COMMERCIAL

## 2025-04-30 VITALS
WEIGHT: 207.13 LBS | HEART RATE: 105 BPM | BODY MASS INDEX: 30.68 KG/M2 | TEMPERATURE: 98 F | DIASTOLIC BLOOD PRESSURE: 79 MMHG | SYSTOLIC BLOOD PRESSURE: 125 MMHG | HEIGHT: 69 IN | RESPIRATION RATE: 18 BRPM | OXYGEN SATURATION: 98 %

## 2025-04-30 DIAGNOSIS — C90.00 MULTIPLE MYELOMA NOT HAVING ACHIEVED REMISSION: ICD-10-CM

## 2025-04-30 DIAGNOSIS — D63.0 ANEMIA IN NEOPLASTIC DISEASE: ICD-10-CM

## 2025-04-30 DIAGNOSIS — C90.01 MULTIPLE MYELOMA IN REMISSION: ICD-10-CM

## 2025-04-30 DIAGNOSIS — G62.0 CHEMOTHERAPY-INDUCED NEUROPATHY: ICD-10-CM

## 2025-04-30 DIAGNOSIS — T45.1X5A CHEMOTHERAPY-INDUCED NEUROPATHY: ICD-10-CM

## 2025-04-30 DIAGNOSIS — Z76.82 STEM CELL TRANSPLANT CANDIDATE: ICD-10-CM

## 2025-04-30 DIAGNOSIS — Z94.84 HISTORY OF AUTOLOGOUS STEM CELL TRANSPLANT: Primary | ICD-10-CM

## 2025-04-30 DIAGNOSIS — D84.81 IMMUNODEFICIENCY DUE TO CONDITIONS CLASSIFIED ELSEWHERE: ICD-10-CM

## 2025-04-30 LAB
ABSOLUTE EOSINOPHIL (OHS): 0.04 K/UL
ABSOLUTE MONOCYTE (OHS): 1.05 K/UL (ref 0.3–1)
ABSOLUTE NEUTROPHIL COUNT (OHS): 2.3 K/UL (ref 1.8–7.7)
ALBUMIN SERPL BCP-MCNC: 3.3 G/DL (ref 3.5–5.2)
ALP SERPL-CCNC: 87 UNIT/L (ref 40–150)
ALT SERPL W/O P-5'-P-CCNC: 35 UNIT/L (ref 10–44)
ANION GAP (OHS): 10 MMOL/L (ref 8–16)
AST SERPL-CCNC: 24 UNIT/L (ref 11–45)
BASOPHILS # BLD AUTO: 0.17 K/UL
BASOPHILS NFR BLD AUTO: 3 %
BILIRUB SERPL-MCNC: 0.2 MG/DL (ref 0.1–1)
BUN SERPL-MCNC: 9 MG/DL (ref 8–23)
CALCIUM SERPL-MCNC: 9.5 MG/DL (ref 8.7–10.5)
CHLORIDE SERPL-SCNC: 106 MMOL/L (ref 95–110)
CO2 SERPL-SCNC: 23 MMOL/L (ref 23–29)
CREAT SERPL-MCNC: 0.7 MG/DL (ref 0.5–1.4)
ERYTHROCYTE [DISTWIDTH] IN BLOOD BY AUTOMATED COUNT: 19 % (ref 11.5–14.5)
GFR SERPLBLD CREATININE-BSD FMLA CKD-EPI: >60 ML/MIN/1.73/M2
GLUCOSE SERPL-MCNC: 125 MG/DL (ref 70–110)
HCT VFR BLD AUTO: 34.3 % (ref 40–54)
HGB BLD-MCNC: 11.4 GM/DL (ref 14–18)
IMM GRANULOCYTES # BLD AUTO: 0.02 K/UL (ref 0–0.04)
IMM GRANULOCYTES NFR BLD AUTO: 0.4 % (ref 0–0.5)
INDIRECT COOMBS: NORMAL
INR PPP: 1.1 (ref 0.8–1.2)
LYMPHOCYTES # BLD AUTO: 2.05 K/UL (ref 1–4.8)
MAGNESIUM SERPL-MCNC: 2 MG/DL (ref 1.6–2.6)
MCH RBC QN AUTO: 30 PG (ref 27–31)
MCHC RBC AUTO-ENTMCNC: 33.2 G/DL (ref 32–36)
MCV RBC AUTO: 90 FL (ref 82–98)
NUCLEATED RBC (/100WBC) (OHS): 0 /100 WBC
PHOSPHATE SERPL-MCNC: 3.2 MG/DL (ref 2.7–4.5)
PLATELET # BLD AUTO: 368 K/UL (ref 150–450)
PLATELET BLD QL SMEAR: NORMAL
PMV BLD AUTO: 10.9 FL (ref 9.2–12.9)
POTASSIUM SERPL-SCNC: 4.2 MMOL/L (ref 3.5–5.1)
PROT SERPL-MCNC: 6.5 GM/DL (ref 6–8.4)
PROTHROMBIN TIME: 11.6 SECONDS (ref 9–12.5)
RBC # BLD AUTO: 3.8 M/UL (ref 4.6–6.2)
RELATIVE EOSINOPHIL (OHS): 0.7 %
RELATIVE LYMPHOCYTE (OHS): 36.4 % (ref 18–48)
RELATIVE MONOCYTE (OHS): 18.7 % (ref 4–15)
RELATIVE NEUTROPHIL (OHS): 40.8 % (ref 38–73)
RH BLD: NORMAL
SODIUM SERPL-SCNC: 139 MMOL/L (ref 136–145)
SPECIMEN OUTDATE: NORMAL
WBC # BLD AUTO: 5.63 K/UL (ref 3.9–12.7)

## 2025-04-30 PROCEDURE — 36415 COLL VENOUS BLD VENIPUNCTURE: CPT

## 2025-04-30 PROCEDURE — 84100 ASSAY OF PHOSPHORUS: CPT

## 2025-04-30 PROCEDURE — 85025 COMPLETE CBC W/AUTO DIFF WBC: CPT

## 2025-04-30 PROCEDURE — 85610 PROTHROMBIN TIME: CPT

## 2025-04-30 PROCEDURE — 99999 PR PBB SHADOW E&M-EST. PATIENT-LVL V: CPT | Mod: PBBFAC,,,

## 2025-04-30 PROCEDURE — 84460 ALANINE AMINO (ALT) (SGPT): CPT

## 2025-04-30 PROCEDURE — 86900 BLOOD TYPING SEROLOGIC ABO: CPT | Performed by: INTERNAL MEDICINE

## 2025-04-30 PROCEDURE — 83735 ASSAY OF MAGNESIUM: CPT

## 2025-04-30 RX ORDER — LENALIDOMIDE 20 MG/1
CAPSULE ORAL
COMMUNITY
Start: 2025-02-07

## 2025-04-30 RX ORDER — ERYTHROMYCIN 5 MG/G
OINTMENT OPHTHALMIC
COMMUNITY
Start: 2025-02-04

## 2025-05-01 NOTE — PROGRESS NOTES
Section of Hematology and Stem Cell Transplantation  New Patient Consult     Date of visit: 5/5/25  Referred by:  Mark Saravia MD  Reason for visit: Autologous stem cell transplant candidate    Oncologic History:     Primary Oncologic Diagnosis: IgG lambda multiple myeloma, standard risk, R-ISS stage NA  Presentation: Anemia  Initial workup:  Labs:  Hemoglobin: NA  Creatinine: NA  Calcium: NA  Serologic studies:  POOJA: IgG lambda and IgG kappa  SPEP: M spike of 3.2  Free light chains: Ratio >400  Immunoglobulins: NA  PET/CT:  10/15/24: PET CT with widely metastatic lytic bone lesions in the left scapula, right glenoid, left posterior 5th rib, right posterior 8th rib, and patchy lesions throughout spine and pelvis   Bone marrow biopsy:  10/14/24: Sparse maturing myelopoiesis and erythropoiesis. 95% plasma cells.   FISH: Clonal plasma cell population (42%) with aberrant expression of CD56 and , cytoplasmic lambda light chain restriction. Gain of chromosome 9, 15, 11q. Gain of 1q21. 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  SPEP with 0.16 g/dL and 0.12 g/dL. FLCs normal.   PET/CT (3/7/25) - no new hypermetabolic lesions.  Hospital Course: Admitted for Jennifer 200 Auto transplant for Multiple Myeloma. Received 4.39x10^6 CD34 stem cells (5 bags) on 4/8/25. Tolerated stem cell infusion without issue. Experienced expected side effects of nausea, ultimately controlled with ATC Zofran and PRN Compazine. Diarrhea C.diff negative and treated with ATC Imodium and ATC lomotil. Neutropenic fever, negative infectious work-up and treated with empiric  Cefepime. Engrafted on day +13. Discharged to [facility] after Vascath removal and discharge teaching completed. BMT clinic follow-up scheduled 4/25.     History of Present Ilness:   Mitchel Joya (Mitchel) is a pleasant 65 y.o.male with multiple myeloma who presents for post-autoSCT.    Interval History 05/05/2025:  Patient here for autoSCT follow up, now day +27, his daughter is with him. He reports doing well; he endorses eating and drinking better. He is doing frequent small meals with no major N/V/D. He has chronic peripheral neuropathy (numbness, burning pain) in his feet, worse at night and is on cymbalta and pregabalin started prior to transplant. He no longer has burning pain but still numbness and weird sensation to his feet. Denies fever, chills, drenching night sweats, chest pain, shortness of breath, new/worsening bone pain, adenopathy.      PAST MEDICAL HISTORY:   Past Medical History:   Diagnosis Date    Coronary artery disease     Hyperlipidemia        PAST SURGICAL HISTORY:   Past Surgical History:   Procedure Laterality Date    BONE MARROW BIOPSY Right 3/5/2025    Procedure: Biopsy-bone marrow;  Surgeon: Kevin Hemphill MD;  Location: [facility] ([address] [place] [address]);  Service: Oncology;  Laterality: Right;    CHOLECYSTECTOMY      heart stint      KNEE SURGERY      left    PLACEMENT OF DUAL-LUMEN VASCULAR CATHETER Right 3/28/2025    Procedure: INSERTION-CATHETER-MONA: Double Lumen, Bard 14.5 Fr. Hemosplit Cath, Model# [id number], Right vs Left;  Surgeon: Miguel Lawson MD;  Location: [facility] OR [address] [place] [address];  Service: General;  Laterality: Right;    SHOULDER SURGERY      SPINE SURGERY         PAST SOCIAL HISTORY:  Social History     Tobacco Use    Smoking status: Never    Smokeless tobacco: Never   Substance Use Topics    Alcohol use: Yes     Comment: occ    Drug use: Never       FAMILY HISTORY:  Family History   Problem Relation Name Age of Onset    No Known Problems Mother      Heart disease Father         CURRENT  MEDICATIONS:   Current Outpatient Medications   Medication Sig    acyclovir (ZOVIRAX) 800 MG Tab Take 1 tablet (800 mg total) by mouth 2 (two) times daily.    aspirin (ECOTRIN) 81 MG EC tablet Take 1 tablet (81 mg total) by mouth once daily. HOLD UNTIL INSTRUCTED TO RESTART    b complex vitamins capsule Take 1 capsule by mouth once daily.    DULoxetine (CYMBALTA) 20 MG capsule Take 20 mg by mouth.    erythromycin (ROMYCIN) ophthalmic ointment SMARTSIG:sparingly In Eye(s) Twice Daily    levothyroxine (SYNTHROID) 88 MCG tablet Take 88 mcg by mouth before breakfast.    multivitamin (THERAGRAN) per tablet Take 1 tablet by mouth once daily.    ondansetron (ZOFRAN-ODT) 8 MG TbDL Take 8 mg by mouth every 6 (six) hours as needed (Nausea/Vomiting).    pantoprazole (PROTONIX) 40 MG tablet Take 1 tablet (40 mg total) by mouth once daily.    pregabalin (LYRICA) 75 MG capsule Take 1 capsule (75 mg total) by mouth every evening.    promethazine (PHENERGAN) 25 MG tablet Take 25 mg by mouth 2 (two) times daily as needed.    REVLIMID 20 mg Cap Take by mouth.    rosuvastatin (CRESTOR) 20 MG tablet Take 20 mg by mouth every evening.    sildenafiL (VIAGRA) 100 MG tablet Take 45 mg by mouth daily as needed for Erectile Dysfunction.    [START ON 5/8/2025] sulfamethoxazole-trimethoprim 800-160mg (BACTRIM DS) 800-160 mg Tab Take 1 tablet by mouth 3 (three) times a week.    tiZANidine (ZANAFLEX) 4 MG tablet Take 4 mg by mouth nightly as needed.    vitamin D (VITAMIN D3) 1000 units Tab Take 1,000 Units by mouth once daily.     No current facility-administered medications for this visit.       ALLERGIES:   Review of patient's allergies indicates:  No Known Allergies    Review of Systems:     Pertinent positives and negatives included in the HPI. Otherwise a complete review of systems is negative.    Physical Exam:     Vitals:    05/05/25 0812   BP: 121/78   Pulse: 97   Resp: 17   Temp: 98.3 °F (36.8 °C)       Physical Exam  Vitals reviewed.    Constitutional:       General: He is not in acute distress.     Appearance: Normal appearance. He is not ill-appearing.   HENT:      Head: Normocephalic and atraumatic.      Nose: No congestion or rhinorrhea.      Mouth/Throat:      Mouth: Mucous membranes are moist.      Pharynx: Oropharynx is clear. No oropharyngeal exudate.   Eyes:      Pupils: Pupils are equal, round, and reactive to light.   Cardiovascular:      Rate and Rhythm: Normal rate and regular rhythm.      Pulses: Normal pulses.      Heart sounds: Normal heart sounds. No murmur heard.  Pulmonary:      Effort: Pulmonary effort is normal.      Breath sounds: Normal breath sounds. No wheezing.   Chest:      Comments: Right chest central line healing.  Abdominal:      General: Bowel sounds are normal. There is no distension.      Palpations: Abdomen is soft.      Tenderness: There is no abdominal tenderness.   Musculoskeletal:         General: Normal range of motion.      Cervical back: Normal range of motion and neck supple.      Right lower leg: No edema.      Left lower leg: No edema.   Skin:     General: Skin is warm and dry.      Findings: No bruising, lesion or rash.   Neurological:      Mental Status: He is alert and oriented to person, place, and time.      Motor: No weakness.   Psychiatric:         Mood and Affect: Mood normal.         Thought Content: Thought content normal.          ECOG Performance Status: (foot note - ECOG PS provided by [employer]) 1 - Symptomatic but completely ambulatory    Karnofsky Performance Score:  90%- Able to Carry on Normal Activity: Minor Symptoms of Disease      Labs:   Lab Results   Component Value Date    WBC 6.86 05/05/2025    RBC 3.88 (L) 05/05/2025    HGB 11.7 (L) 05/05/2025    HCT 36.9 (L) 05/05/2025    MCV 95 05/05/2025    MCH 30.2 05/05/2025    MCHC 31.7 (L) 05/05/2025    RDW 19.7 (H) 05/05/2025     05/05/2025    MPV 10.3 05/05/2025    GRAN 6.4 04/22/2025    LYMPH 35.1  05/05/2025    LYMPH 2.41 05/05/2025    MONO 21.0 (H) 05/05/2025    MONO 1.44 (H) 05/05/2025    EOS 4.2 05/05/2025    EOS 0.29 05/05/2025    BASO 0.05 03/10/2025    EOSINOPHIL 13.3 (H) 04/15/2025    BASOPHIL 4.7 (H) 05/05/2025    BASOPHIL 0.32 (H) 05/05/2025       CMP  Sodium   Date Value Ref Range Status   05/05/2025 142 136 - 145 mmol/L Final   03/10/2025 136 136 - 145 mmol/L Final     Potassium   Date Value Ref Range Status   05/05/2025 3.6 3.5 - 5.1 mmol/L Final   03/10/2025 3.5 3.5 - 5.1 mmol/L Final     Chloride   Date Value Ref Range Status   05/05/2025 108 95 - 110 mmol/L Final   03/10/2025 102 95 - 110 mmol/L Final     CO2   Date Value Ref Range Status   05/05/2025 24 23 - 29 mmol/L Final   03/10/2025 27 23 - 29 mmol/L Final     Glucose   Date Value Ref Range Status   05/05/2025 109 70 - 110 mg/dL Final   03/10/2025 101 70 - 110 mg/dL Final     BUN   Date Value Ref Range Status   05/05/2025 7 (L) 8 - 23 mg/dL Final     Creatinine   Date Value Ref Range Status   05/05/2025 0.7 0.5 - 1.4 mg/dL Final     Calcium   Date Value Ref Range Status   05/05/2025 9.7 8.7 - 10.5 mg/dL Final   03/10/2025 9.4 8.7 - 10.5 mg/dL Final     Protein Total   Date Value Ref Range Status   05/05/2025 6.2 6.0 - 8.4 gm/dL Final     Total Protein   Date Value Ref Range Status   03/10/2025 6.8 6.0 - 8.4 g/dL Final     Albumin   Date Value Ref Range Status   05/05/2025 3.4 (L) 3.5 - 5.2 g/dL Final   03/10/2025 3.7 3.5 - 5.2 g/dL Final     Total Bilirubin   Date Value Ref Range Status   03/10/2025 0.3 0.1 - 1.0 mg/dL Final     Comment:     For infants and newborns, interpretation of results should be based  on gestational age, weight and in agreement with clinical  observations.    Premature Infant recommended reference ranges:  Up to 24 hours.............<8.0 mg/dL  Up to 48 hours............<12.0 mg/dL  3-5 days..................<15.0 mg/dL  6-29 days.................<15.0 mg/dL       Bilirubin Total   Date Value Ref Range Status    05/05/2025 0.2 0.1 - 1.0 mg/dL Final     Comment:     For infants and newborns, interpretation of results should be based   on gestational age, weight and in agreement with clinical   observations.    Premature Infant recommended reference ranges:   0-24 hours:  <8.0 mg/dL   24-48 hours: <12.0 mg/dL   3-5 days:    <15.0 mg/dL   6-29 days:   <15.0 mg/dL     Alkaline Phosphatase   Date Value Ref Range Status   03/10/2025 84 40 - 150 U/L Final     ALP   Date Value Ref Range Status   05/05/2025 84 40 - 150 unit/L Final     AST   Date Value Ref Range Status   05/05/2025 25 11 - 45 unit/L Final   03/10/2025 27 10 - 40 U/L Final     ALT   Date Value Ref Range Status   05/05/2025 31 10 - 44 unit/L Final   03/10/2025 48 (H) 10 - 44 U/L Final     Anion Gap   Date Value Ref Range Status   05/05/2025 10 8 - 16 mmol/L Final       Imaging:  Reviewed     Pathology:  Reviewed        Assessment and Plan:   Mitchel Couch) is a pleasant 65 y.o.male with multiple myeloma who presents for follow up.    History of autologous stem cell transplant:  - Conditioning Regimen: Melphalan 200 mg/m2 on 4/8/25.  - Cell Dose: Received 4.39x10^6 CD34 stem cells (5 bags)  - Today is day 27.  - Recommend CBC, CMP, Mg, Phos, and Type & Screen and local follow up with Dr. Saravia in approximately 3-4 weeks.  - Plan for local labs and virtual follow up with myeloma NP in 6-7 weeks.  - Plan for day +100 restaging on 7/16/25 and follow up with Dr. Hemphill 1 week later on 7/24/25.  - Maintenance: Korina+Rev    Multiple myeloma, standard risk, R-ISS stage NA:  His oncologic history is detailed above. He remains on treatment with Korina VRd. He achieved VGPR with induction pre-transplant. Treament held in anticipation of collection and transplant.  - See autoSCT above.    Immunodeficiency due to conditions classified elsewhere:  - Continue valacyclovir for shingles prophylaxis  - Start bactrim MWF on 5/9/25 through day +180 for PJP prophylaxis  - Plan  for referral to infectious disease for post-transplant vaccination at day +100.    Anemia  - Secondary to pre-transplant chemotherapy  - Improving post-transplant    Chemotherapy Induced Neuropathy  - Lyrica 75 mg nightly has seen mild improvement  - Duloxetine 20 mg daily, patient hasn't started medication      Orders/Follow Up:      Orders Placed This Encounter    CBC Auto Differential    Comprehensive Metabolic Panel    Immunofixation, Serum    Immunoglobulin Free LT Chains Blood    Immunoglobulins (IgG, IgA, IgM) Quantitative    Protein Electrophoresis, Serum    Magnesium    Phosphorus    Type & Screen       Route Chart for Scheduling    BMT Chart Routing      Follow up with physician    Follow up with [facility] 6 weeks. La Nena Kaminski: autoSCT follow up   Provider visit type    Infusion scheduling note    Injection scheduling note N/A   Labs CBC, CMP, magnesium, phosphorus and type and screen   Scheduling:  Preferred lab:  Lab interval:  [place]: above in 6 weeks; move 7/24 labs to 7/16 while here for restaging   Imaging    Pharmacy appointment    Other referrals                       Total time of this visit was 41 minutes, including time spent face to face with patient and/or via video/audio, and also in preparing for today's visit for MDM and documentation. (Medical Decision Making, including consideration of possible diagnoses, management options, complex medical record review, review of diagnostic tests and information, consideration and discussion of significant complications based on comorbidities, and discussion with providers involved with the care of the patient). Greater than 50% was spent face to face with the patient counseling and coordinating care.    Visit today included increased complexity associated with the care of the episodic problem peripheral neurpathy addressed and managing the longitudinal care of the patient due to the serious and/or complex managed problem(s) multiple myeloma, s/p  autologous stem cell transplant.     La Nena Pastrana, RADHAP-C  Hematologic Malignancies, Stem Cell Transplantation, and Cellular Therapy  [facility]

## 2025-05-05 ENCOUNTER — LAB VISIT (OUTPATIENT)
Dept: LAB | Facility: HOSPITAL | Age: 65
End: 2025-05-05
Attending: INTERNAL MEDICINE
Payer: COMMERCIAL

## 2025-05-05 ENCOUNTER — OFFICE VISIT (OUTPATIENT)
Dept: HEMATOLOGY/ONCOLOGY | Facility: CLINIC | Age: 65
End: 2025-05-05
Payer: COMMERCIAL

## 2025-05-05 VITALS
HEART RATE: 97 BPM | HEIGHT: 69 IN | BODY MASS INDEX: 30.76 KG/M2 | OXYGEN SATURATION: 98 % | WEIGHT: 207.69 LBS | RESPIRATION RATE: 17 BRPM | TEMPERATURE: 98 F | SYSTOLIC BLOOD PRESSURE: 121 MMHG | DIASTOLIC BLOOD PRESSURE: 78 MMHG

## 2025-05-05 DIAGNOSIS — C90.01 MULTIPLE MYELOMA IN REMISSION: ICD-10-CM

## 2025-05-05 DIAGNOSIS — C90.00 MULTIPLE MYELOMA NOT HAVING ACHIEVED REMISSION: ICD-10-CM

## 2025-05-05 DIAGNOSIS — D63.0 ANEMIA IN NEOPLASTIC DISEASE: ICD-10-CM

## 2025-05-05 DIAGNOSIS — T45.1X5A CHEMOTHERAPY-INDUCED NEUROPATHY: ICD-10-CM

## 2025-05-05 DIAGNOSIS — D84.81 IMMUNODEFICIENCY DUE TO CONDITIONS CLASSIFIED ELSEWHERE: ICD-10-CM

## 2025-05-05 DIAGNOSIS — G62.0 CHEMOTHERAPY-INDUCED NEUROPATHY: ICD-10-CM

## 2025-05-05 DIAGNOSIS — Z94.84 HISTORY OF AUTOLOGOUS STEM CELL TRANSPLANT: Primary | ICD-10-CM

## 2025-05-05 LAB
ABSOLUTE EOSINOPHIL (OHS): 0.29 K/UL
ABSOLUTE MONOCYTE (OHS): 1.44 K/UL (ref 0.3–1)
ABSOLUTE NEUTROPHIL COUNT (OHS): 2.38 K/UL (ref 1.8–7.7)
ALBUMIN SERPL BCP-MCNC: 3.4 G/DL (ref 3.5–5.2)
ALP SERPL-CCNC: 84 UNIT/L (ref 40–150)
ALT SERPL W/O P-5'-P-CCNC: 31 UNIT/L (ref 10–44)
ANION GAP (OHS): 10 MMOL/L (ref 8–16)
ANISOCYTOSIS BLD QL SMEAR: NORMAL
AST SERPL-CCNC: 25 UNIT/L (ref 11–45)
BASOPHILS # BLD AUTO: 0.32 K/UL
BASOPHILS NFR BLD AUTO: 4.7 %
BILIRUB SERPL-MCNC: 0.2 MG/DL (ref 0.1–1)
BUN SERPL-MCNC: 7 MG/DL (ref 8–23)
CALCIUM SERPL-MCNC: 9.7 MG/DL (ref 8.7–10.5)
CHLORIDE SERPL-SCNC: 108 MMOL/L (ref 95–110)
CO2 SERPL-SCNC: 24 MMOL/L (ref 23–29)
CREAT SERPL-MCNC: 0.7 MG/DL (ref 0.5–1.4)
DACRYOCYTES BLD QL SMEAR: NORMAL
ERYTHROCYTE [DISTWIDTH] IN BLOOD BY AUTOMATED COUNT: 19.7 % (ref 11.5–14.5)
GFR SERPLBLD CREATININE-BSD FMLA CKD-EPI: >60 ML/MIN/1.73/M2
GLUCOSE SERPL-MCNC: 109 MG/DL (ref 70–110)
HCT VFR BLD AUTO: 36.9 % (ref 40–54)
HGB BLD-MCNC: 11.7 GM/DL (ref 14–18)
IMM GRANULOCYTES # BLD AUTO: 0.02 K/UL (ref 0–0.04)
IMM GRANULOCYTES NFR BLD AUTO: 0.3 % (ref 0–0.5)
INDIRECT COOMBS: NORMAL
LYMPHOCYTES # BLD AUTO: 2.41 K/UL (ref 1–4.8)
MAGNESIUM SERPL-MCNC: 1.9 MG/DL (ref 1.6–2.6)
MCH RBC QN AUTO: 30.2 PG (ref 27–31)
MCHC RBC AUTO-ENTMCNC: 31.7 G/DL (ref 32–36)
MCV RBC AUTO: 95 FL (ref 82–98)
NUCLEATED RBC (/100WBC) (OHS): 0 /100 WBC
OVALOCYTES BLD QL SMEAR: NORMAL
PHOSPHATE SERPL-MCNC: 3.7 MG/DL (ref 2.7–4.5)
PLATELET # BLD AUTO: 440 K/UL (ref 150–450)
PMV BLD AUTO: 10.3 FL (ref 9.2–12.9)
POIKILOCYTOSIS BLD QL SMEAR: SLIGHT
POLYCHROMASIA BLD QL SMEAR: NORMAL
POTASSIUM SERPL-SCNC: 3.6 MMOL/L (ref 3.5–5.1)
PROT SERPL-MCNC: 6.2 GM/DL (ref 6–8.4)
RBC # BLD AUTO: 3.88 M/UL (ref 4.6–6.2)
RELATIVE EOSINOPHIL (OHS): 4.2 %
RELATIVE LYMPHOCYTE (OHS): 35.1 % (ref 18–48)
RELATIVE MONOCYTE (OHS): 21 % (ref 4–15)
RELATIVE NEUTROPHIL (OHS): 34.7 % (ref 38–73)
RH BLD: NORMAL
SODIUM SERPL-SCNC: 142 MMOL/L (ref 136–145)
SPECIMEN OUTDATE: NORMAL
SPHEROCYTES BLD QL SMEAR: NORMAL
WBC # BLD AUTO: 6.86 K/UL (ref 3.9–12.7)

## 2025-05-05 PROCEDURE — 84100 ASSAY OF PHOSPHORUS: CPT

## 2025-05-05 PROCEDURE — 82040 ASSAY OF SERUM ALBUMIN: CPT

## 2025-05-05 PROCEDURE — 99999 PR PBB SHADOW E&M-EST. PATIENT-LVL V: CPT | Mod: PBBFAC,,,

## 2025-05-05 PROCEDURE — 85025 COMPLETE CBC W/AUTO DIFF WBC: CPT

## 2025-05-05 PROCEDURE — 36415 COLL VENOUS BLD VENIPUNCTURE: CPT

## 2025-05-05 PROCEDURE — 83735 ASSAY OF MAGNESIUM: CPT

## 2025-05-05 PROCEDURE — 86850 RBC ANTIBODY SCREEN: CPT

## 2025-05-05 NOTE — PATIENT INSTRUCTIONS
Start Bactrim on 5/9/25 ([facility])  Labs and follow up with Dr. Saravia in 3-4 weeks  Labs at [facility] and virtual with La Nena in 6-7 weeks  Restaging on 7/16/26 with PET scan and sedation bone marrow biopsy (will require fasting) + labs here at [facility] main campus  Follow up with Dr. Hemphill on 7/24/25 for restaging results and maintenance chemotherapy plan.

## 2025-06-12 ENCOUNTER — LAB VISIT (OUTPATIENT)
Dept: LAB | Facility: HOSPITAL | Age: 65
End: 2025-06-12
Payer: COMMERCIAL

## 2025-06-12 DIAGNOSIS — C90.01 MULTIPLE MYELOMA IN REMISSION: ICD-10-CM

## 2025-06-12 DIAGNOSIS — Z94.84 HISTORY OF AUTOLOGOUS STEM CELL TRANSPLANT: ICD-10-CM

## 2025-06-12 LAB
ABSOLUTE EOSINOPHIL (OHS): 0.34 K/UL
ABSOLUTE MONOCYTE (OHS): 0.58 K/UL (ref 0.3–1)
ABSOLUTE NEUTROPHIL COUNT (OHS): 2.98 K/UL (ref 1.8–7.7)
ALBUMIN SERPL BCP-MCNC: 3.8 G/DL (ref 3.5–5.2)
ALP SERPL-CCNC: 80 UNIT/L (ref 40–150)
ALT SERPL W/O P-5'-P-CCNC: 27 UNIT/L (ref 10–44)
ANION GAP (OHS): 8 MMOL/L (ref 8–16)
AST SERPL-CCNC: 22 UNIT/L (ref 11–45)
BASOPHILS # BLD AUTO: 0.04 K/UL
BASOPHILS NFR BLD AUTO: 0.5 %
BILIRUB SERPL-MCNC: 0.2 MG/DL (ref 0.1–1)
BUN SERPL-MCNC: 9 MG/DL (ref 8–23)
CALCIUM SERPL-MCNC: 9.3 MG/DL (ref 8.7–10.5)
CHLORIDE SERPL-SCNC: 105 MMOL/L (ref 95–110)
CO2 SERPL-SCNC: 25 MMOL/L (ref 23–29)
CREAT SERPL-MCNC: 0.8 MG/DL (ref 0.5–1.4)
ERYTHROCYTE [DISTWIDTH] IN BLOOD BY AUTOMATED COUNT: 17.3 % (ref 11.5–14.5)
GFR SERPLBLD CREATININE-BSD FMLA CKD-EPI: >60 ML/MIN/1.73/M2
GLUCOSE SERPL-MCNC: 188 MG/DL (ref 70–110)
HCT VFR BLD AUTO: 40.1 % (ref 40–54)
HGB BLD-MCNC: 12.8 GM/DL (ref 14–18)
IMM GRANULOCYTES # BLD AUTO: 0.01 K/UL (ref 0–0.04)
IMM GRANULOCYTES NFR BLD AUTO: 0.1 % (ref 0–0.5)
LYMPHOCYTES # BLD AUTO: 3.5 K/UL (ref 1–4.8)
MAGNESIUM SERPL-MCNC: 2 MG/DL (ref 1.6–2.6)
MCH RBC QN AUTO: 31.3 PG (ref 27–31)
MCHC RBC AUTO-ENTMCNC: 31.9 G/DL (ref 32–36)
MCV RBC AUTO: 98 FL (ref 82–98)
NUCLEATED RBC (/100WBC) (OHS): 0 /100 WBC
PHOSPHATE SERPL-MCNC: 2.6 MG/DL (ref 2.7–4.5)
PLATELET # BLD AUTO: 220 K/UL (ref 150–450)
PMV BLD AUTO: 10.4 FL (ref 9.2–12.9)
POTASSIUM SERPL-SCNC: 3.9 MMOL/L (ref 3.5–5.1)
PROT SERPL-MCNC: 6.3 GM/DL (ref 6–8.4)
RBC # BLD AUTO: 4.09 M/UL (ref 4.6–6.2)
RELATIVE EOSINOPHIL (OHS): 4.6 %
RELATIVE LYMPHOCYTE (OHS): 47 % (ref 18–48)
RELATIVE MONOCYTE (OHS): 7.8 % (ref 4–15)
RELATIVE NEUTROPHIL (OHS): 40 % (ref 38–73)
SODIUM SERPL-SCNC: 138 MMOL/L (ref 136–145)
WBC # BLD AUTO: 7.45 K/UL (ref 3.9–12.7)

## 2025-06-12 PROCEDURE — 82247 BILIRUBIN TOTAL: CPT

## 2025-06-12 PROCEDURE — 84100 ASSAY OF PHOSPHORUS: CPT

## 2025-06-12 PROCEDURE — 36415 COLL VENOUS BLD VENIPUNCTURE: CPT | Mod: PO

## 2025-06-12 PROCEDURE — 83735 ASSAY OF MAGNESIUM: CPT

## 2025-06-12 PROCEDURE — 85025 COMPLETE CBC W/AUTO DIFF WBC: CPT

## 2025-06-12 PROCEDURE — 86850 RBC ANTIBODY SCREEN: CPT

## 2025-06-12 NOTE — H&P (VIEW-ONLY)
The patient location is: [place]  The chief complaint leading to consultation is: Peripheral Neuropathy    Visit type: audiovisual    Face to Face time with patient: 14 minutes  30 minutes of total time spent on the encounter, which includes face to face time and non-face to face time preparing to see the patient (eg, review of tests), Obtaining and/or reviewing separately obtained history, Documenting clinical information in the electronic or other health record, Independently interpreting results (not separately reported) and communicating results to the patient/family/caregiver, or Care coordination (not separately reported).     Each patient to whom he or she provides medical services by telemedicine is:  (1) informed of the relationship between the physician and patient and the respective role of any other health care provider with respect to management of the patient; and (2) notified that he or she may decline to receive medical services by telemedicine and may withdraw from such care at any time.    Section of Hematology and Stem Cell Transplantation  New Patient Consult     Date of visit: 6/16/25  Referred by:  Mark Saravia MD  Reason for visit: Autologous stem cell transplant candidate    Oncologic History:     Primary Oncologic Diagnosis: IgG lambda multiple myeloma, standard risk, R-ISS stage NA  Presentation: Anemia  Initial workup:  Labs:  Hemoglobin: NA  Creatinine: NA  Calcium: NA  Serologic studies:  POOJA: IgG lambda and IgG kappa  SPEP: M spike of 3.2  Free light chains: Ratio >400  Immunoglobulins: NA  PET/CT:  10/15/24: PET CT with widely metastatic lytic bone lesions in the left scapula, right glenoid, left posterior 5th rib, right posterior 8th rib, and patchy lesions throughout spine and pelvis   Bone marrow biopsy:  10/14/24: Sparse maturing myelopoiesis and erythropoiesis. 95% plasma cells.   FISH: Clonal plasma cell population (42%) with aberrant expression of CD56 and ,  cytoplasmic lambda light chain restriction. Gain of chromosome 9, 15, 11q. Gain of 1q21. 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  SPEP with 0.16 g/dL and 0.12 g/dL. FLCs normal.   PET/CT (3/7/25) - no new hypermetabolic lesions.  Hospital Course: Admitted for Jennifer 200 Auto transplant for Multiple Myeloma. Received 4.39x10^6 CD34 stem cells (5 bags) on 4/8/25. Tolerated stem cell infusion without issue. Experienced expected side effects of nausea, ultimately controlled with ATC Zofran and PRN Compazine. Diarrhea C.diff negative and treated with ATC Imodium and ATC lomotil. Neutropenic fever, negative infectious work-up and treated with empiric Cefepime. Engrafted on day +13. Discharged to [facility] after Vascath removal and discharge teaching completed. BMT clinic follow-up scheduled 4/25.     History of Present Ilness:   Mitchel Joya (Mitchel) is a pleasant 65 y.o.male with multiple myeloma who presents for post-autoSCT. Follows locally with Pascual Saravia at [facility].    Interval History 06/16/2025:  Patient here for follow up, now day +69 post-transplant. Patient is feeling well, reports that all his stomach issues have resolved. He continues to have chronic neuropathy in his feet, with numbness/sensation changes. He still gets fatigued fairly easily. He is eating and drinking well.     PAST MEDICAL HISTORY:   Past Medical History:   Diagnosis Date    Coronary artery disease     Hyperlipidemia        PAST SURGICAL HISTORY:   Past Surgical History:   Procedure Laterality Date    BONE MARROW BIOPSY Right 3/5/2025    Procedure: Biopsy-bone marrow;   Surgeon: Kevin Hemphill MD;  Location: [facility] ENDO (2ND FLR);  Service: Oncology;  Laterality: Right;    CHOLECYSTECTOMY      heart stint      KNEE SURGERY      left    PLACEMENT OF DUAL-LUMEN VASCULAR CATHETER Right 3/28/2025    Procedure: INSERTION-CATHETER-MONA: Double Lumen, Bard 14.5 Fr. Hemosplit Cath, Model# [id number], Right vs Left;  Surgeon: Miguel Lawson MD;  Location: [facility] OR 2ND FLR;  Service: General;  Laterality: Right;    SHOULDER SURGERY      SPINE SURGERY         PAST SOCIAL HISTORY:  Social History     Tobacco Use    Smoking status: Never    Smokeless tobacco: Never   Substance Use Topics    Alcohol use: Yes     Comment: occ    Drug use: Never       FAMILY HISTORY:  Family History   Problem Relation Name Age of Onset    No Known Problems Mother      Heart disease Father         CURRENT MEDICATIONS:   Current Outpatient Medications   Medication Sig    acyclovir (ZOVIRAX) 800 MG Tab Take 1 tablet (800 mg total) by mouth 2 (two) times daily.    aspirin (ECOTRIN) 81 MG EC tablet Take 1 tablet (81 mg total) by mouth once daily. HOLD UNTIL INSTRUCTED TO RESTART    b complex vitamins capsule Take 1 capsule by mouth once daily.    DULoxetine (CYMBALTA) 20 MG capsule Take 20 mg by mouth.    erythromycin (ROMYCIN) ophthalmic ointment SMARTSIG:sparingly In Eye(s) Twice Daily    levothyroxine (SYNTHROID) 88 MCG tablet Take 88 mcg by mouth before breakfast.    multivitamin (THERAGRAN) per tablet Take 1 tablet by mouth once daily.    ondansetron (ZOFRAN-ODT) 8 MG TbDL Take 8 mg by mouth every 6 (six) hours as needed (Nausea/Vomiting).    pantoprazole (PROTONIX) 40 MG tablet Take 1 tablet (40 mg total) by mouth once daily.    pregabalin (LYRICA) 75 MG capsule Take 1 capsule (75 mg total) by mouth every evening.    promethazine (PHENERGAN) 25 MG tablet Take 25 mg by mouth 2 (two) times daily as needed.    REVLIMID 20 mg Cap Take by mouth.    rosuvastatin (CRESTOR) 20 MG tablet Take 20 mg by mouth every  evening.    sildenafiL (VIAGRA) 100 MG tablet Take 45 mg by mouth daily as needed for Erectile Dysfunction.    sulfamethoxazole-trimethoprim 800-160mg (BACTRIM DS) 800-160 mg Tab Take 1 tablet by mouth 3 (three) times a week.    tiZANidine (ZANAFLEX) 4 MG tablet Take 4 mg by mouth nightly as needed.    vitamin D (VITAMIN D3) 1000 units Tab Take 1,000 Units by mouth once daily.     No current facility-administered medications for this visit.       ALLERGIES:   Review of patient's allergies indicates:  No Known Allergies    Review of Systems:     Pertinent positives and negatives included in the HPI. Otherwise a complete review of systems is negative.    Physical Exam:     There were no vitals filed for this visit.      Physical Exam  Vitals reviewed.   Constitutional:       General: He is not in acute distress.     Appearance: Normal appearance. He is not ill-appearing.   HENT:      Head: Normocephalic and atraumatic.      Nose: No congestion or rhinorrhea.      Mouth/Throat:      Mouth: Mucous membranes are moist.      Pharynx: Oropharynx is clear. No oropharyngeal exudate.   Eyes:      Pupils: Pupils are equal, round, and reactive to light.   Cardiovascular:      Rate and Rhythm: Normal rate and regular rhythm.      Pulses: Normal pulses.      Heart sounds: Normal heart sounds. No murmur heard.  Pulmonary:      Effort: Pulmonary effort is normal.      Breath sounds: Normal breath sounds. No wheezing.   Chest:      Comments: Right chest central line healing.  Abdominal:      General: Bowel sounds are normal. There is no distension.      Palpations: Abdomen is soft.      Tenderness: There is no abdominal tenderness.   Musculoskeletal:         General: Normal range of motion.      Cervical back: Normal range of motion and neck supple.      Right lower leg: No edema.      Left lower leg: No edema.   Skin:     General: Skin is warm and dry.      Findings: No bruising, lesion or rash.   Neurological:      Mental Status:  He is alert and oriented to person, place, and time.      Motor: No weakness.   Psychiatric:         Mood and Affect: Mood normal.         Thought Content: Thought content normal.          ECOG Performance Status: (foot note - ECOG PS provided by [employer]cology [employer]) 1 - Symptomatic but completely ambulatory    Karnofsky Performance Score:  90%- Able to Carry on Normal Activity: Minor Symptoms of Disease      Labs:   Lab Results   Component Value Date    WBC 7.45 06/12/2025    RBC 4.09 (L) 06/12/2025    HGB 12.8 (L) 06/12/2025    HCT 40.1 06/12/2025    MCV 98 06/12/2025    MCH 31.3 (H) 06/12/2025    MCHC 31.9 (L) 06/12/2025    RDW 17.3 (H) 06/12/2025     06/12/2025    MPV 10.4 06/12/2025    GRAN 6.4 04/22/2025    LYMPH 47.0 06/12/2025    LYMPH 3.50 06/12/2025    MONO 7.8 06/12/2025    MONO 0.58 06/12/2025    EOS 4.6 06/12/2025    EOS 0.34 06/12/2025    BASO 0.05 03/10/2025    EOSINOPHIL 13.3 (H) 04/15/2025    BASOPHIL 0.5 06/12/2025    BASOPHIL 0.04 06/12/2025       CMP  Sodium   Date Value Ref Range Status   06/12/2025 138 136 - 145 mmol/L Final   03/10/2025 136 136 - 145 mmol/L Final     Potassium   Date Value Ref Range Status   06/12/2025 3.9 3.5 - 5.1 mmol/L Final   03/10/2025 3.5 3.5 - 5.1 mmol/L Final     Chloride   Date Value Ref Range Status   06/12/2025 105 95 - 110 mmol/L Final   03/10/2025 102 95 - 110 mmol/L Final     CO2   Date Value Ref Range Status   06/12/2025 25 23 - 29 mmol/L Final   03/10/2025 27 23 - 29 mmol/L Final     Glucose   Date Value Ref Range Status   06/12/2025 188 (H) 70 - 110 mg/dL Final   03/10/2025 101 70 - 110 mg/dL Final     BUN   Date Value Ref Range Status   06/12/2025 9 8 - 23 mg/dL Final     Creatinine   Date Value Ref Range Status   06/12/2025 0.8 0.5 - 1.4 mg/dL Final     Calcium   Date Value Ref Range Status   06/12/2025 9.3 8.7 - 10.5 mg/dL Final   03/10/2025 9.4 8.7 - 10.5 mg/dL Final     Protein Total   Date Value Ref Range Status   06/12/2025 6.3  6.0 - 8.4 gm/dL Final     Total Protein   Date Value Ref Range Status   03/10/2025 6.8 6.0 - 8.4 g/dL Final     Albumin   Date Value Ref Range Status   06/12/2025 3.8 3.5 - 5.2 g/dL Final   03/10/2025 3.7 3.5 - 5.2 g/dL Final     Total Bilirubin   Date Value Ref Range Status   03/10/2025 0.3 0.1 - 1.0 mg/dL Final     Comment:     For infants and newborns, interpretation of results should be based  on gestational age, weight and in agreement with clinical  observations.    Premature Infant recommended reference ranges:  Up to 24 hours.............<8.0 mg/dL  Up to 48 hours............<12.0 mg/dL  3-5 days..................<15.0 mg/dL  6-29 days.................<15.0 mg/dL       Bilirubin Total   Date Value Ref Range Status   06/12/2025 0.2 0.1 - 1.0 mg/dL Final     Comment:     For infants and newborns, interpretation of results should be based   on gestational age, weight and in agreement with clinical   observations.    Premature Infant recommended reference ranges:   0-24 hours:  <8.0 mg/dL   24-48 hours: <12.0 mg/dL   3-5 days:    <15.0 mg/dL   6-29 days:   <15.0 mg/dL     Alkaline Phosphatase   Date Value Ref Range Status   03/10/2025 84 40 - 150 U/L Final     ALP   Date Value Ref Range Status   06/12/2025 80 40 - 150 unit/L Final     AST   Date Value Ref Range Status   06/12/2025 22 11 - 45 unit/L Final   03/10/2025 27 10 - 40 U/L Final     ALT   Date Value Ref Range Status   06/12/2025 27 10 - 44 unit/L Final   03/10/2025 48 (H) 10 - 44 U/L Final     Anion Gap   Date Value Ref Range Status   06/12/2025 8 8 - 16 mmol/L Final       Imaging:  Reviewed     Pathology:  Reviewed        Assessment and Plan:   Mitchel Couch) is a pleasant 65 y.o.male with multiple myeloma who presents for follow up.    History of autologous stem cell transplant:  - Conditioning Regimen: Melphalan 200 mg/m2 on 4/8/25.  - Cell Dose: Received 4.39x10^6 CD34 stem cells (5 bags)  - Today is day 69.  - Recommend CBC, CMP, Mg, Phos,  and Type & Screen and local follow up with Dr. Saravia in approximately 3-4 weeks.  - Plan for local labs and virtual follow up with myeloma NP in 6-7 weeks.  - Plan for day +100 restaging on 7/16/25 and follow up with Dr. Hemphill 1 week later on 7/24/25.  - Maintenance: Korina+Rev with local oncologist after restaging    Multiple myeloma, standard risk, R-ISS stage NA:  His oncologic history is detailed above. He remains on treatment with Korina VRd. He achieved VGPR with induction pre-transplant. Treament held in anticipation of collection and transplant.  - See autoSCT above.    Immunodeficiency due to conditions classified elsewhere:  - Continue valacyclovir for shingles prophylaxis  - Start bactrim MWF on 5/9/25 through day +180 for PJP prophylaxis  - Plan for infectious disease appointment on 7/25/25.    Anemia  - Secondary to pre-transplant chemotherapy  - Improved    Chemotherapy Induced Neuropathy  - Lyrica 75 mg nightly has seen mild improvement  - Continue Duloxetine 20 mg daily with mild improvement; offered to increase dosing to 60 mg daily but patient declined      Orders/Follow Up:      Orders Placed This Encounter    CBC Auto Differential    Comprehensive Metabolic Panel    Immunofixation, Serum    Immunoglobulin Free LT Chains Blood    Immunoglobulins (IgG, IgA, IgM) Quantitative    Protein Electrophoresis, Serum         Route Chart for Scheduling    BMT Chart Routing      Follow up with physician . As scheduled w/ Janice   Follow up with [facility] 4 months. La Nena Kaminski: autoSCT   Provider visit type    Infusion scheduling note    Injection scheduling note    Labs   Scheduling:  Preferred lab:  Lab interval:  As scheduled for restaging   Imaging    Pharmacy appointment    Other referrals                         Visit today included increased complexity associated with the care of the episodic problem peripheral neurpathy addressed and managing the longitudinal care of the patient due to the serious and/or  complex managed problem(s) multiple myeloma, s/p autologous stem cell transplant.     La Nena Pastrana, FNP-C  Hematologic Malignancies, Stem Cell Transplantation, and Cellular Therapy  [facility] and [facility]

## 2025-06-12 NOTE — PROGRESS NOTES
The patient location is: [place]  The chief complaint leading to consultation is: Peripheral Neuropathy    Visit type: audiovisual    Face to Face time with patient: 14 minutes  30 minutes of total time spent on the encounter, which includes face to face time and non-face to face time preparing to see the patient (eg, review of tests), Obtaining and/or reviewing separately obtained history, Documenting clinical information in the electronic or other health record, Independently interpreting results (not separately reported) and communicating results to the patient/family/caregiver, or Care coordination (not separately reported).     Each patient to whom he or she provides medical services by telemedicine is:  (1) informed of the relationship between the physician and patient and the respective role of any other health care provider with respect to management of the patient; and (2) notified that he or she may decline to receive medical services by telemedicine and may withdraw from such care at any time.    Section of Hematology and Stem Cell Transplantation  New Patient Consult     Date of visit: 6/16/25  Referred by:  Mark Saravia MD  Reason for visit: Autologous stem cell transplant candidate    Oncologic History:     Primary Oncologic Diagnosis: IgG lambda multiple myeloma, standard risk, R-ISS stage NA  Presentation: Anemia  Initial workup:  Labs:  Hemoglobin: NA  Creatinine: NA  Calcium: NA  Serologic studies:  POOJA: IgG lambda and IgG kappa  SPEP: M spike of 3.2  Free light chains: Ratio >400  Immunoglobulins: NA  PET/CT:  10/15/24: PET CT with widely metastatic lytic bone lesions in the left scapula, right glenoid, left posterior 5th rib, right posterior 8th rib, and patchy lesions throughout spine and pelvis   Bone marrow biopsy:  10/14/24: Sparse maturing myelopoiesis and erythropoiesis. 95% plasma cells.   FISH: Clonal plasma cell population (42%) with aberrant expression of CD56 and ,  cytoplasmic lambda light chain restriction. Gain of chromosome 9, 15, 11q. Gain of 1q21. 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  SPEP with 0.16 g/dL and 0.12 g/dL. FLCs normal.   PET/CT (3/7/25) - no new hypermetabolic lesions.  Hospital Course: Admitted for Jennifer 200 Auto transplant for Multiple Myeloma. Received 4.39x10^6 CD34 stem cells (5 bags) on 4/8/25. Tolerated stem cell infusion without issue. Experienced expected side effects of nausea, ultimately controlled with ATC Zofran and PRN Compazine. Diarrhea C.diff negative and treated with ATC Imodium and ATC lomotil. Neutropenic fever, negative infectious work-up and treated with empiric Cefepime. Engrafted on day +13. Discharged to [facility] after Vascath removal and discharge teaching completed. BMT clinic follow-up scheduled 4/25.     History of Present Ilness:   Mitchel Joya (Mitchel) is a pleasant 65 y.o.male with multiple myeloma who presents for post-autoSCT. Follows locally with Pascual Saravia at [facility].    Interval History 06/16/2025:  Patient here for follow up, now day +69 post-transplant. Patient is feeling well, reports that all his stomach issues have resolved. He continues to have chronic neuropathy in his feet, with numbness/sensation changes. He still gets fatigued fairly easily. He is eating and drinking well.     PAST MEDICAL HISTORY:   Past Medical History:   Diagnosis Date    Coronary artery disease     Hyperlipidemia        PAST SURGICAL HISTORY:   Past Surgical History:   Procedure Laterality Date    BONE MARROW BIOPSY Right 3/5/2025    Procedure: Biopsy-bone marrow;   Surgeon: Kevin Hemphill MD;  Location: [facility] ENDO (2ND FLR);  Service: Oncology;  Laterality: Right;    CHOLECYSTECTOMY      heart stint      KNEE SURGERY      left    PLACEMENT OF DUAL-LUMEN VASCULAR CATHETER Right 3/28/2025    Procedure: INSERTION-CATHETER-MONA: Double Lumen, Bard 14.5 Fr. Hemosplit Cath, Model# [id number], Right vs Left;  Surgeon: Miguel Lawson MD;  Location: [facility] OR 2ND FLR;  Service: General;  Laterality: Right;    SHOULDER SURGERY      SPINE SURGERY         PAST SOCIAL HISTORY:  Social History     Tobacco Use    Smoking status: Never    Smokeless tobacco: Never   Substance Use Topics    Alcohol use: Yes     Comment: occ    Drug use: Never       FAMILY HISTORY:  Family History   Problem Relation Name Age of Onset    No Known Problems Mother      Heart disease Father         CURRENT MEDICATIONS:   Current Outpatient Medications   Medication Sig    acyclovir (ZOVIRAX) 800 MG Tab Take 1 tablet (800 mg total) by mouth 2 (two) times daily.    aspirin (ECOTRIN) 81 MG EC tablet Take 1 tablet (81 mg total) by mouth once daily. HOLD UNTIL INSTRUCTED TO RESTART    b complex vitamins capsule Take 1 capsule by mouth once daily.    DULoxetine (CYMBALTA) 20 MG capsule Take 20 mg by mouth.    erythromycin (ROMYCIN) ophthalmic ointment SMARTSIG:sparingly In Eye(s) Twice Daily    levothyroxine (SYNTHROID) 88 MCG tablet Take 88 mcg by mouth before breakfast.    multivitamin (THERAGRAN) per tablet Take 1 tablet by mouth once daily.    ondansetron (ZOFRAN-ODT) 8 MG TbDL Take 8 mg by mouth every 6 (six) hours as needed (Nausea/Vomiting).    pantoprazole (PROTONIX) 40 MG tablet Take 1 tablet (40 mg total) by mouth once daily.    pregabalin (LYRICA) 75 MG capsule Take 1 capsule (75 mg total) by mouth every evening.    promethazine (PHENERGAN) 25 MG tablet Take 25 mg by mouth 2 (two) times daily as needed.    REVLIMID 20 mg Cap Take by mouth.    rosuvastatin (CRESTOR) 20 MG tablet Take 20 mg by mouth every  evening.    sildenafiL (VIAGRA) 100 MG tablet Take 45 mg by mouth daily as needed for Erectile Dysfunction.    sulfamethoxazole-trimethoprim 800-160mg (BACTRIM DS) 800-160 mg Tab Take 1 tablet by mouth 3 (three) times a week.    tiZANidine (ZANAFLEX) 4 MG tablet Take 4 mg by mouth nightly as needed.    vitamin D (VITAMIN D3) 1000 units Tab Take 1,000 Units by mouth once daily.     No current facility-administered medications for this visit.       ALLERGIES:   Review of patient's allergies indicates:  No Known Allergies    Review of Systems:     Pertinent positives and negatives included in the HPI. Otherwise a complete review of systems is negative.    Physical Exam:     There were no vitals filed for this visit.      Physical Exam  Vitals reviewed.   Constitutional:       General: He is not in acute distress.     Appearance: Normal appearance. He is not ill-appearing.   HENT:      Head: Normocephalic and atraumatic.      Nose: No congestion or rhinorrhea.      Mouth/Throat:      Mouth: Mucous membranes are moist.      Pharynx: Oropharynx is clear. No oropharyngeal exudate.   Eyes:      Pupils: Pupils are equal, round, and reactive to light.   Cardiovascular:      Rate and Rhythm: Normal rate and regular rhythm.      Pulses: Normal pulses.      Heart sounds: Normal heart sounds. No murmur heard.  Pulmonary:      Effort: Pulmonary effort is normal.      Breath sounds: Normal breath sounds. No wheezing.   Chest:      Comments: Right chest central line healing.  Abdominal:      General: Bowel sounds are normal. There is no distension.      Palpations: Abdomen is soft.      Tenderness: There is no abdominal tenderness.   Musculoskeletal:         General: Normal range of motion.      Cervical back: Normal range of motion and neck supple.      Right lower leg: No edema.      Left lower leg: No edema.   Skin:     General: Skin is warm and dry.      Findings: No bruising, lesion or rash.   Neurological:      Mental Status:  He is alert and oriented to person, place, and time.      Motor: No weakness.   Psychiatric:         Mood and Affect: Mood normal.         Thought Content: Thought content normal.          ECOG Performance Status: (foot note - ECOG PS provided by Eastern Cooperative Oncology Group) 1 - Symptomatic but completely ambulatory    Karnofsky Performance Score:  90%- Able to Carry on Normal Activity: Minor Symptoms of Disease      Labs:   Lab Results   Component Value Date    WBC 7.45 06/12/2025    RBC 4.09 (L) 06/12/2025    HGB 12.8 (L) 06/12/2025    HCT 40.1 06/12/2025    MCV 98 06/12/2025    MCH 31.3 (H) 06/12/2025    MCHC 31.9 (L) 06/12/2025    RDW 17.3 (H) 06/12/2025     06/12/2025    MPV 10.4 06/12/2025    GRAN 6.4 04/22/2025    LYMPH 47.0 06/12/2025    LYMPH 3.50 06/12/2025    MONO 7.8 06/12/2025    MONO 0.58 06/12/2025    EOS 4.6 06/12/2025    EOS 0.34 06/12/2025    BASO 0.05 03/10/2025    EOSINOPHIL 13.3 (H) 04/15/2025    BASOPHIL 0.5 06/12/2025    BASOPHIL 0.04 06/12/2025       CMP  Sodium   Date Value Ref Range Status   06/12/2025 138 136 - 145 mmol/L Final   03/10/2025 136 136 - 145 mmol/L Final     Potassium   Date Value Ref Range Status   06/12/2025 3.9 3.5 - 5.1 mmol/L Final   03/10/2025 3.5 3.5 - 5.1 mmol/L Final     Chloride   Date Value Ref Range Status   06/12/2025 105 95 - 110 mmol/L Final   03/10/2025 102 95 - 110 mmol/L Final     CO2   Date Value Ref Range Status   06/12/2025 25 23 - 29 mmol/L Final   03/10/2025 27 23 - 29 mmol/L Final     Glucose   Date Value Ref Range Status   06/12/2025 188 (H) 70 - 110 mg/dL Final   03/10/2025 101 70 - 110 mg/dL Final     BUN   Date Value Ref Range Status   06/12/2025 9 8 - 23 mg/dL Final     Creatinine   Date Value Ref Range Status   06/12/2025 0.8 0.5 - 1.4 mg/dL Final     Calcium   Date Value Ref Range Status   06/12/2025 9.3 8.7 - 10.5 mg/dL Final   03/10/2025 9.4 8.7 - 10.5 mg/dL Final     Protein Total   Date Value Ref Range Status   06/12/2025 6.3  6.0 - 8.4 gm/dL Final     Total Protein   Date Value Ref Range Status   03/10/2025 6.8 6.0 - 8.4 g/dL Final     Albumin   Date Value Ref Range Status   06/12/2025 3.8 3.5 - 5.2 g/dL Final   03/10/2025 3.7 3.5 - 5.2 g/dL Final     Total Bilirubin   Date Value Ref Range Status   03/10/2025 0.3 0.1 - 1.0 mg/dL Final     Comment:     For infants and newborns, interpretation of results should be based  on gestational age, weight and in agreement with clinical  observations.    Premature Infant recommended reference ranges:  Up to 24 hours.............<8.0 mg/dL  Up to 48 hours............<12.0 mg/dL  3-5 days..................<15.0 mg/dL  6-29 days.................<15.0 mg/dL       Bilirubin Total   Date Value Ref Range Status   06/12/2025 0.2 0.1 - 1.0 mg/dL Final     Comment:     For infants and newborns, interpretation of results should be based   on gestational age, weight and in agreement with clinical   observations.    Premature Infant recommended reference ranges:   0-24 hours:  <8.0 mg/dL   24-48 hours: <12.0 mg/dL   3-5 days:    <15.0 mg/dL   6-29 days:   <15.0 mg/dL     Alkaline Phosphatase   Date Value Ref Range Status   03/10/2025 84 40 - 150 U/L Final     ALP   Date Value Ref Range Status   06/12/2025 80 40 - 150 unit/L Final     AST   Date Value Ref Range Status   06/12/2025 22 11 - 45 unit/L Final   03/10/2025 27 10 - 40 U/L Final     ALT   Date Value Ref Range Status   06/12/2025 27 10 - 44 unit/L Final   03/10/2025 48 (H) 10 - 44 U/L Final     Anion Gap   Date Value Ref Range Status   06/12/2025 8 8 - 16 mmol/L Final       Imaging:  Reviewed     Pathology:  Reviewed        Assessment and Plan:   Mitchel Couch) is a pleasant 65 y.o.male with multiple myeloma who presents for follow up.    History of autologous stem cell transplant:  - Conditioning Regimen: Melphalan 200 mg/m2 on 4/8/25.  - Cell Dose: Received 4.39x10^6 CD34 stem cells (5 bags)  - Today is day 69.  - Recommend CBC, CMP, Mg, Phos,  and Type & Screen and local follow up with Dr. Saravia in approximately 3-4 weeks.  - Plan for local labs and virtual follow up with myeloma NP in 6-7 weeks.  - Plan for day +100 restaging on 7/16/25 and follow up with Dr. Hemphill 1 week later on 7/24/25.  - Maintenance: Korina+Rev with local oncologist after restaging    Multiple myeloma, standard risk, R-ISS stage NA:  His oncologic history is detailed above. He remains on treatment with Korina VRd. He achieved VGPR with induction pre-transplant. Treament held in anticipation of collection and transplant.  - See autoSCT above.    Immunodeficiency due to conditions classified elsewhere:  - Continue valacyclovir for shingles prophylaxis  - Start bactrim MWF on 5/9/25 through day +180 for PJP prophylaxis  - Plan for infectious disease appointment on 7/25/25.    Anemia  - Secondary to pre-transplant chemotherapy  - Improved    Chemotherapy Induced Neuropathy  - Lyrica 75 mg nightly has seen mild improvement  - Continue Duloxetine 20 mg daily with mild improvement; offered to increase dosing to 60 mg daily but patient declined      Orders/Follow Up:      Orders Placed This Encounter    CBC Auto Differential    Comprehensive Metabolic Panel    Immunofixation, Serum    Immunoglobulin Free LT Chains Blood    Immunoglobulins (IgG, IgA, IgM) Quantitative    Protein Electrophoresis, Serum         Route Chart for Scheduling    BMT Chart Routing      Follow up with physician . As scheduled w/ Janice   Follow up with [facility] 4 months. La Nena Kaminski: autoSCT   Provider visit type    Infusion scheduling note    Injection scheduling note    Labs   Scheduling:  Preferred lab:  Lab interval:  As scheduled for restaging   Imaging    Pharmacy appointment    Other referrals                         Visit today included increased complexity associated with the care of the episodic problem peripheral neurpathy addressed and managing the longitudinal care of the patient due to the serious and/or  complex managed problem(s) multiple myeloma, s/p autologous stem cell transplant.     La Nena Pastrana, FNP-C  Hematologic Malignancies, Stem Cell Transplantation, and Cellular Therapy  [facility]

## 2025-06-16 ENCOUNTER — OFFICE VISIT (OUTPATIENT)
Dept: HEMATOLOGY/ONCOLOGY | Facility: CLINIC | Age: 65
End: 2025-06-16
Payer: COMMERCIAL

## 2025-06-16 DIAGNOSIS — G62.0 CHEMOTHERAPY-INDUCED NEUROPATHY: ICD-10-CM

## 2025-06-16 DIAGNOSIS — D84.81 IMMUNODEFICIENCY DUE TO CONDITIONS CLASSIFIED ELSEWHERE: ICD-10-CM

## 2025-06-16 DIAGNOSIS — D63.0 ANEMIA IN NEOPLASTIC DISEASE: ICD-10-CM

## 2025-06-16 DIAGNOSIS — T45.1X5A CHEMOTHERAPY-INDUCED NEUROPATHY: ICD-10-CM

## 2025-06-16 DIAGNOSIS — C90.01 MULTIPLE MYELOMA IN REMISSION: ICD-10-CM

## 2025-06-16 DIAGNOSIS — Z94.84 HISTORY OF AUTOLOGOUS STEM CELL TRANSPLANT: Primary | ICD-10-CM

## 2025-07-09 ENCOUNTER — PATIENT MESSAGE (OUTPATIENT)
Dept: HEMATOLOGY/ONCOLOGY | Facility: CLINIC | Age: 65
End: 2025-07-09
Payer: COMMERCIAL

## 2025-07-16 ENCOUNTER — HOSPITAL ENCOUNTER (OUTPATIENT)
Dept: RADIOLOGY | Facility: HOSPITAL | Age: 65
Discharge: HOME | End: 2025-07-16
Attending: INTERNAL MEDICINE
Payer: COMMERCIAL

## 2025-07-16 ENCOUNTER — HOSPITAL ENCOUNTER (OUTPATIENT)
Facility: HOSPITAL | Age: 65
Discharge: HOME | End: 2025-07-16
Attending: INTERNAL MEDICINE | Admitting: INTERNAL MEDICINE
Payer: COMMERCIAL

## 2025-07-16 ENCOUNTER — ANESTHESIA EVENT (OUTPATIENT)
Dept: ENDOSCOPY | Facility: HOSPITAL | Age: 65
End: 2025-07-16
Payer: COMMERCIAL

## 2025-07-16 ENCOUNTER — ANESTHESIA (OUTPATIENT)
Dept: ENDOSCOPY | Facility: HOSPITAL | Age: 65
End: 2025-07-16
Payer: COMMERCIAL

## 2025-07-16 VITALS
HEIGHT: 69 IN | OXYGEN SATURATION: 100 % | HEART RATE: 68 BPM | TEMPERATURE: 97 F | DIASTOLIC BLOOD PRESSURE: 68 MMHG | WEIGHT: 223.75 LBS | BODY MASS INDEX: 33.14 KG/M2 | SYSTOLIC BLOOD PRESSURE: 139 MMHG | RESPIRATION RATE: 18 BRPM

## 2025-07-16 DIAGNOSIS — C90.00 MULTIPLE MYELOMA NOT HAVING ACHIEVED REMISSION: ICD-10-CM

## 2025-07-16 DIAGNOSIS — Z94.81 S/P AUTOLOGOUS BONE MARROW TRANSPLANTATION: ICD-10-CM

## 2025-07-16 LAB — POCT GLUCOSE: 112 MG/DL (ref 70–110)

## 2025-07-16 PROCEDURE — 88185 FLOWCYTOMETRY/TC ADD-ON: CPT | Mod: 59

## 2025-07-16 PROCEDURE — 37000009 HC ANESTHESIA EA ADD 15 MINS: Performed by: INTERNAL MEDICINE

## 2025-07-16 PROCEDURE — A9552 F18 FDG: HCPCS | Performed by: INTERNAL MEDICINE

## 2025-07-16 PROCEDURE — 78816 PET IMAGE W/CT FULL BODY: CPT | Mod: TC

## 2025-07-16 PROCEDURE — 78816 PET IMAGE W/CT FULL BODY: CPT | Mod: 26,PS,, | Performed by: NUCLEAR MEDICINE

## 2025-07-16 PROCEDURE — 63600175 PHARM REV CODE 636 W HCPCS

## 2025-07-16 PROCEDURE — 88271 CYTOGENETICS DNA PROBE: CPT

## 2025-07-16 PROCEDURE — 88185 FLOWCYTOMETRY/TC ADD-ON: CPT

## 2025-07-16 PROCEDURE — 25000003 PHARM REV CODE 250

## 2025-07-16 PROCEDURE — 37000008 HC ANESTHESIA 1ST 15 MINUTES: Performed by: INTERNAL MEDICINE

## 2025-07-16 PROCEDURE — 88299 UNLISTED CYTOGENETIC STUDY: CPT

## 2025-07-16 PROCEDURE — 88184 FLOWCYTOMETRY/ TC 1 MARKER: CPT | Mod: 59

## 2025-07-16 PROCEDURE — 38222 DX BONE MARROW BX & ASPIR: CPT | Performed by: INTERNAL MEDICINE

## 2025-07-16 RX ORDER — PROPOFOL 10 MG/ML
VIAL (ML) INTRAVENOUS CONTINUOUS PRN
Status: DISCONTINUED | OUTPATIENT
Start: 2025-07-16 | End: 2025-07-16

## 2025-07-16 RX ORDER — SODIUM CHLORIDE 9 MG/ML
INJECTION, SOLUTION INTRAVENOUS CONTINUOUS
Status: DISCONTINUED | OUTPATIENT
Start: 2025-07-16 | End: 2025-07-16 | Stop reason: HOSPADM

## 2025-07-16 RX ORDER — FLUDEOXYGLUCOSE F18 500 MCI/ML
12 INJECTION INTRAVENOUS
Status: COMPLETED | OUTPATIENT
Start: 2025-07-16 | End: 2025-07-16

## 2025-07-16 RX ORDER — LIDOCAINE HYDROCHLORIDE 20 MG/ML
INJECTION INTRAVENOUS
Status: DISCONTINUED | OUTPATIENT
Start: 2025-07-16 | End: 2025-07-16

## 2025-07-16 RX ADMIN — PROPOFOL 50 MG: 10 INJECTION, EMULSION INTRAVENOUS at 12:07

## 2025-07-16 RX ADMIN — LIDOCAINE HYDROCHLORIDE 100 MG: 20 INJECTION INTRAVENOUS at 12:07

## 2025-07-16 RX ADMIN — FLUDEOXYGLUCOSE F-18 11.84 MILLICURIE: 500 INJECTION INTRAVENOUS at 08:07

## 2025-07-16 RX ADMIN — SODIUM CHLORIDE: 9 INJECTION, SOLUTION INTRAVENOUS at 12:07

## 2025-07-16 RX ADMIN — PROPOFOL 175 MCG/KG/MIN: 10 INJECTION, EMULSION INTRAVENOUS at 12:07

## 2025-07-16 NOTE — DISCHARGE SUMMARY
[facility]th [facility]  Hematology  Bone Marrow Transplant  Discharge Summary      Patient Name: Mitchel Joya  MRN: [id number]  Admission Date: 7/16/2025  Hospital Length of Stay: 0 days  Discharge Date and Time: 07/16/2025 1:17 PM  Attending Physician: Kevin Hemphill MD   Discharging Provider: Mariusz Grant PA-C  Primary Care Provider: Juan Pablo Pereira MD    HPI: No notes on file    Procedure(s) (LRB):  Biopsy-bone marrow (N/A)     Hospital Course:   Patient admitted to endoscopy suite today for a bone marrow aspiration and biopsy. Pt was consented for a bone marrow biopsy. Patient was sedated per anesthesia and a bone marrow biopsy and aspiration was performed in the endoscopy suite procedure room (see procedure note). Patient was then transferred to post op recovery area and discharged home when appropriate per anesthesia.      Goals of Care Treatment Preferences:  Code Status: Full Code          Significant Diagnostic Studies: N/A    Pending Diagnostic Studies:       Procedure Component Value Units Date/Time    Heme Disorders DNA/RNA Hold -Bone Marrow [[id number]] Collected: 07/16/25 1220    Order Status: Sent Lab Status: No result     Specimen: Bone Marrow     Leukemia/Lymphoma Screen - Bone Marrow Left Posterior Iliac Crest [[id number]] Collected: 07/16/25 1220    Order Status: Sent Lab Status: No result     Specimen: Bone Marrow     Multiple Myeloma MRD by Flow, BM [[id number]] Collected: 07/16/25 1220    Order Status: Sent Lab Status: No result     Specimen: Bone Marrow     Plasma Cell Proliferative Disorder (PCPD), FISH [[id number]] Collected: 07/16/25 1220    Order Status: Sent Lab Status: No result     Specimen: Bone Marrow Aspirate, Left Iliac Crest     Specimen to Pathology, Bone Marrow Aspiration/Biopsy [[id number]] Collected: 07/16/25 1220    Order Status: Sent Lab Status: No result     Specimen: Bone Marrow Aspirate, Left Iliac Crest; Bone Marrow Clot, Left Iliac Crest; Bone Marrow  Core Bx, Left Iliac Crest           There are no hospital problems to display for this patient.     Discharged Condition: stable    Disposition: Home or Self Care    Follow Up:    Future Appointments   Date Time Provider Department Center   7/25/2025  9:40 AM Barrett Camacho MD [facility] BMT Sorin Kong   7/25/2025 10:30 AM Mariel Car DO [facility] ID [address]   10/16/2025  9:20 AM La Nena Pastrana NP [facility] BMT [facility]ell Cance      Patient Instructions:      Diet Adult Regular     Notify your health care provider if you experience any of the following:  temperature >100.4     Notify your health care provider if you experience any of the following:  persistent nausea and vomiting or diarrhea     Notify your health care provider if you experience any of the following:  severe uncontrolled pain     Notify your health care provider if you experience any of the following:  redness, tenderness, or signs of infection (pain, swelling, redness, odor or green/yellow discharge around incision site)     Notify your health care provider if you experience any of the following:  difficulty breathing or increased cough     Notify your health care provider if you experience any of the following:  severe persistent headache     Notify your health care provider if you experience any of the following:  worsening rash     Notify your health care provider if you experience any of the following:  persistent dizziness, light-headedness, or visual disturbances     Notify your health care provider if you experience any of the following:  increased confusion or weakness     Remove dressing in 24 hours     Activity as tolerated     Medications:  Reconciled Home Medications:      Medication List        ASK your doctor about these medications      acyclovir 800 MG Tab  Commonly known as: ZOVIRAX  Take 1 tablet (800 mg total) by mouth 2 (two) times daily.     aspirin 81 MG EC tablet  Commonly known as: ECOTRIN  Take 1 tablet (81 mg  total) by mouth once daily. HOLD UNTIL INSTRUCTED TO RESTART     b complex vitamins capsule  Take 1 capsule by mouth once daily.     DULoxetine 20 MG capsule  Commonly known as: CYMBALTA  Take 20 mg by mouth.     levothyroxine 88 MCG tablet  Commonly known as: SYNTHROID  Take 88 mcg by mouth before breakfast.     multivitamin per tablet  Commonly known as: THERAGRAN  Take 1 tablet by mouth once daily.     ondansetron 8 MG Tbdl  Commonly known as: ZOFRAN-ODT  Take 8 mg by mouth every 6 (six) hours as needed (Nausea/Vomiting).     pantoprazole 40 MG tablet  Commonly known as: PROTONIX  Take 1 tablet (40 mg total) by mouth once daily.     pregabalin 75 MG capsule  Commonly known as: LYRICA  Take 1 capsule (75 mg total) by mouth every evening.     promethazine 25 MG tablet  Commonly known as: PHENERGAN  Take 25 mg by mouth 2 (two) times daily as needed.     rosuvastatin 20 MG tablet  Commonly known as: CRESTOR  Take 20 mg by mouth every evening.     sildenafiL 100 MG tablet  Commonly known as: VIAGRA  Take 45 mg by mouth daily as needed for Erectile Dysfunction.     sulfamethoxazole-trimethoprim 800-160mg 800-160 mg Tab  Commonly known as: BACTRIM DS  Take 1 tablet by mouth 3 (three) times a week.     tiZANidine 4 MG tablet  Commonly known as: ZANAFLEX  Take 4 mg by mouth nightly as needed.     vitamin D 1000 units Tab  Commonly known as: VITAMIN D3  Take 1,000 Units by mouth once daily.              Mariusz Grant PA-C  Bone Marrow Transplant  [facility]

## 2025-07-16 NOTE — TRANSFER OF CARE
"Anesthesia Transfer of Care Note    Patient: Mitchel Joya    Procedure(s) Performed: Procedure(s) (LRB):  Biopsy-bone marrow (N/A)    Patient location: GI    Anesthesia Type: general    Transport from OR: Transported from OR on room air with adequate spontaneous ventilation    Post pain: adequate analgesia    Post assessment: no apparent anesthetic complications and tolerated procedure well    Post vital signs: stable    Level of consciousness: awake    Nausea/Vomiting: no nausea/vomiting    Complications: none    Transfer of care protocol was followed      Last vitals: Visit Vitals  BP (!) 183/94 (BP Location: Left arm, Patient Position: Lying)   Pulse 68   Temp 36.7 °C (98.1 °F)   Resp 16   Ht 5' 9" (1.753 m)   Wt 101.5 kg (223 lb 12.3 oz)   SpO2 99%   BMI 33.04 kg/m²     "

## 2025-07-16 NOTE — PROCEDURES
"Mitchel Joya is a 65 y.o. male patient.    Temp: 97.3 °F (36.3 °C) (07/16/25 1245)  Pulse: 69 (07/16/25 1300)  Resp: 18 (07/16/25 1245)  BP: 113/62 (Map 83) (07/16/25 1245)  SpO2: 100 % (07/16/25 1245)  Weight: 101.5 kg (223 lb 12.3 oz) (07/16/25 1138)  Height: 5' 9" (175.3 cm) (07/16/25 1138)       Procedures  PROCEDURE NOTE:  Date of Procedure: 7/16/25  Bone Marrow Biopsy and Aspiration  Indication: Day +99 s/p auto SCT MM  Consent: Informed consent was obtained from patient.  Timeout: Done and documented. Allergies reviewed.  Position: RLD  Site: Left posterior illiac crest.  Prep: Betadine.  Needle used: 11 gauge Jamshidi needle.  Anesthetic: 2% lidocaine 5 cc.  Biopsy: The biopsy needle was introduced into the marrow cavity and an aspirate was obtained without complications and sent for flow cytometry, MRD, PCPD fish, DNA/RNA hold and cytogenetics. Core biopsy obtained without difficulty and sent for routine histologic examination.  Complications: None.  Disposition: The patient was placed supine following procedure. RN to assess bandaid for bleeding prior to discharge home. Patient aware to keep bandaid dry and intact for 24hrs and to call clinic for any bleeding, fevers, pain, or signs of infection. Discharge home per anesthesia protocol.  Blood loss: Minimal.     Mariusz Grant PA-C  Hematology/Oncology/BMT   7/16/2025    "

## 2025-07-16 NOTE — ANESTHESIA PREPROCEDURE EVALUATION
07/16/2025  Mitchel Joya is a 65 y.o., male.    Past Medical History:   Diagnosis Date    Coronary artery disease     Hyperlipidemia      Past Surgical History:   Procedure Laterality Date    BONE MARROW BIOPSY Right 3/5/2025    Procedure: Biopsy-bone marrow;  Surgeon: Yessi Hemphill MD;  Location: [facility] (2ND FLR);  Service: Oncology;  Laterality: Right;    CHOLECYSTECTOMY      heart stint      KNEE SURGERY      left    PLACEMENT OF DUAL-LUMEN VASCULAR CATHETER Right 3/28/2025    Procedure: INSERTION-CATHETER-MONA: Double Lumen, Bard 14.5 Fr. Hemosplit Cath, Model# [id number], Right vs Left;  Surgeon: Miguel Lawson MD;  [facility];  Service: General;  Laterality: Right;    SHOULDER SURGERY      SPINE SURGERY       Results for orders placed or performed during the hospital encounter of 03/10/25   EKG 12-lead    Collection Time: 03/10/25  3:45 PM   Result Value Ref Range    QRS Duration 104 ms    [facility] QTC Calculation 468 ms    Narrative    Test Reason : C90.00,Z76.82,    Vent. Rate :  77 BPM     Atrial Rate :  77 BPM     P-R Int : 180 ms          QRS Dur : 104 ms      QT Int : 414 ms       P-R-T Axes :  39  12  48 degrees    QTcB Int : 468 ms    Sinus rhythm with Premature atrial complexes  Otherwise normal ECG  No previous ECGs available  Confirmed by Timothy Hill ([id number]) on 3/10/2025 5:33:15 PM    Referred By: YESSI HEMPHILL           Confirmed By: Timothy Hill       Echo  Result Date: 3/10/2025    Left Ventricle: The left ventricle is normal in size. Normal wall   thickness. There is concentric remodeling. There is normal systolic   function with a visually estimated ejection fraction of 55 - 60%.   Quantitated ejection fraction is 58%. Global longitudinal strain is   -17.0%. Global longitudinal strain is normal. There is normal diastolic   function.    Right Ventricle: The right  ventricle is normal in size. Systolic   function is normal.    Aortic Valve: There is mild aortic valve sclerosis. Mildly restricted   motion.    Pulmonary Artery: The estimated pulmonary artery systolic pressure is   17 mmHg.    IVC/SVC: Normal venous pressure at 3 mmHg.            Pre-op Assessment    I have reviewed the Patient Summary Reports.    I have reviewed the NPO Status.   I have reviewed the Medications.     Review of Systems  Anesthesia Hx:  No problems with previous Anesthesia                Social:  Non-Smoker       Hematology/Oncology:  Hematology Normal   Oncology Normal                                   EENT/Dental:  EENT/Dental Normal           Cardiovascular:  Cardiovascular Normal       CAD                                          Pulmonary:        Sleep Apnea                Renal/:  Renal/ Normal                 Hepatic/GI:  Hepatic/GI Normal                    Musculoskeletal:  Musculoskeletal Normal                Neurological:    Neuromuscular Disease,                                   Endocrine:   Hypothyroidism          Dermatological:  Skin Normal    Psych:  Psychiatric Normal                    Physical Exam  General: Well nourished, Cooperative, Alert and Oriented    Airway:  Mallampati: II   Mouth Opening: Normal  TM Distance: Normal  Tongue: Normal  Neck ROM: Normal ROM    Dental:  Intact        Anesthesia Plan  Type of Anesthesia, risks & benefits discussed:    Anesthesia Type: Gen Natural Airway  Intra-op Monitoring Plan: Standard ASA Monitors  Induction:  IV  ASA Score: 2  Day of Surgery Review of History & Physical: H&P Update referred to the surgeon/provider.I have interviewed and examined the patient. I have reviewed the patient's H&P dated:     Ready For Surgery From Anesthesia Perspective.     .

## 2025-07-16 NOTE — DISCHARGE INSTRUCTIONS
Discharge instructions for having a Bone Marrow Aspiration / Biopsy:    Keep Bandage in place for 24 hours.  - Do not shower or take a tube bath during this time (you may sponge bathe).  - Call the nurse or physician for excessive bleeding or pain at biopsy site.  - You may take Tylenol as needed for pain.    During procedure today you have received medication to sedate you. These medications may affect your judgment, balance, and/or coordination. Therefore, for the next 24 hours, you have the follow restrictions:  - Do not drive a car or operate heavy machinery for the rest of the day.  - Do not make legal/financial decisions, sign important papers, or drink alcohol.  - You may resume other activities as tolerated.    You can call [phone] for any problems during the hours of 8:00 AM-5:00PM.    For an emergency after 5:00 PM you can call [phone] and have the  page the Hematologist / Oncologist on call.

## 2025-07-16 NOTE — ANESTHESIA POSTPROCEDURE EVALUATION
Anesthesia Post Evaluation    Patient: Mitchel Joya    Procedure(s) Performed: Procedure(s) (LRB):  Biopsy-bone marrow (N/A)    Final Anesthesia Type: general      Patient location during evaluation: GI PACU  Patient participation: Yes- Able to Participate  Level of consciousness: awake and alert  Post-procedure vital signs: reviewed and stable  Pain management: adequate  Airway patency: patent    PONV status at discharge: No PONV  Anesthetic complications: no      Cardiovascular status: blood pressure returned to baseline  Respiratory status: unassisted  Hydration status: euvolemic  Follow-up not needed.          Vitals Value Taken Time   /68 07/16/25 13:15   Temp 36.3 °C (97.3 °F) 07/16/25 12:45   Pulse 68 07/16/25 13:15   Resp 18 07/16/25 13:15   SpO2 100 % 07/16/25 13:15         Event Time   Out of Recovery 13:33:00         Pain/Tommy Score: Pain Rating Prior to Med Admin: 4 (7/16/2025  1:15 PM)  Tommy Score: 10 (7/16/2025  1:15 PM)

## 2025-07-16 NOTE — PLAN OF CARE
Discharge instructions reviewed with patient and spouse Cherie. Left buttock dressing remains clean, dry and intact. Patient c/o slight aching at present. PIV removed. Correction in LDA with Bone marrow site corrected    1333 Escorted to lobby with wife, steady gait NAD noted. Pain 3-4/10 intermittent aching  sensation.

## 2025-07-17 LAB
LAB FLOW CYTOMETRY ANTIBODIES ANALYZED (OHS): NORMAL
LAB FLOW CYTOMETRY COMMENT (OHS): NORMAL
LAB FLOW CYTOMETRY INTERPRETATION (OHS): NORMAL
LABORATORY COMMENT REPORT: NORMAL

## 2025-07-18 LAB — M PCPDS PRE-ANALYSIS CELL SORT: NORMAL

## 2025-07-19 LAB
M DNA/RNA EXTRACT AND HOLD RESULT: NORMAL
M DNA/RNA EXTRACTION: NORMAL
SPECIMEN SOURCE: NORMAL

## 2025-07-25 ENCOUNTER — OFFICE VISIT (OUTPATIENT)
Dept: HEMATOLOGY/ONCOLOGY | Facility: CLINIC | Age: 65
End: 2025-07-25
Payer: COMMERCIAL

## 2025-07-25 ENCOUNTER — OFFICE VISIT (OUTPATIENT)
Dept: INFECTIOUS DISEASES | Facility: CLINIC | Age: 65
End: 2025-07-25
Payer: COMMERCIAL

## 2025-07-25 VITALS
RESPIRATION RATE: 12 BRPM | DIASTOLIC BLOOD PRESSURE: 76 MMHG | HEART RATE: 73 BPM | WEIGHT: 225.31 LBS | HEIGHT: 69 IN | SYSTOLIC BLOOD PRESSURE: 119 MMHG | TEMPERATURE: 98 F | OXYGEN SATURATION: 99 % | BODY MASS INDEX: 33.37 KG/M2

## 2025-07-25 VITALS
SYSTOLIC BLOOD PRESSURE: 145 MMHG | HEIGHT: 69 IN | HEART RATE: 66 BPM | DIASTOLIC BLOOD PRESSURE: 87 MMHG | WEIGHT: 226.19 LBS | TEMPERATURE: 98 F | BODY MASS INDEX: 33.5 KG/M2

## 2025-07-25 DIAGNOSIS — Z94.84 HISTORY OF AUTOLOGOUS STEM CELL TRANSPLANT: Primary | ICD-10-CM

## 2025-07-25 DIAGNOSIS — C90.01 MULTIPLE MYELOMA IN REMISSION: Primary | ICD-10-CM

## 2025-07-25 DIAGNOSIS — Z94.84 STEM CELLS TRANSPLANT STATUS: ICD-10-CM

## 2025-07-25 PROCEDURE — 3078F DIAST BP <80 MM HG: CPT | Mod: CPTII,S$GLB,, | Performed by: INTERNAL MEDICINE

## 2025-07-25 PROCEDURE — 3008F BODY MASS INDEX DOCD: CPT | Mod: CPTII,S$GLB,, | Performed by: INTERNAL MEDICINE

## 2025-07-25 PROCEDURE — 99204 OFFICE O/P NEW MOD 45 MIN: CPT | Mod: S$GLB,,, | Performed by: INTERNAL MEDICINE

## 2025-07-25 PROCEDURE — 3074F SYST BP LT 130 MM HG: CPT | Mod: CPTII,S$GLB,, | Performed by: INTERNAL MEDICINE

## 2025-07-25 PROCEDURE — 3077F SYST BP >= 140 MM HG: CPT | Mod: CPTII,S$GLB,, | Performed by: INTERNAL MEDICINE

## 2025-07-25 PROCEDURE — 99999 PR PBB SHADOW E&M-EST. PATIENT-LVL IV: CPT | Mod: PBBFAC,,, | Performed by: INTERNAL MEDICINE

## 2025-07-25 PROCEDURE — 1126F AMNT PAIN NOTED NONE PRSNT: CPT | Mod: CPTII,S$GLB,, | Performed by: INTERNAL MEDICINE

## 2025-07-25 PROCEDURE — 99215 OFFICE O/P EST HI 40 MIN: CPT | Mod: S$GLB,,, | Performed by: INTERNAL MEDICINE

## 2025-07-25 PROCEDURE — 1125F AMNT PAIN NOTED PAIN PRSNT: CPT | Mod: CPTII,S$GLB,, | Performed by: INTERNAL MEDICINE

## 2025-07-25 PROCEDURE — 1101F PT FALLS ASSESS-DOCD LE1/YR: CPT | Mod: CPTII,S$GLB,, | Performed by: INTERNAL MEDICINE

## 2025-07-25 PROCEDURE — G2211 COMPLEX E/M VISIT ADD ON: HCPCS | Mod: S$GLB,,, | Performed by: INTERNAL MEDICINE

## 2025-07-25 PROCEDURE — 3288F FALL RISK ASSESSMENT DOCD: CPT | Mod: CPTII,S$GLB,, | Performed by: INTERNAL MEDICINE

## 2025-07-25 PROCEDURE — 1159F MED LIST DOCD IN RCRD: CPT | Mod: CPTII,S$GLB,, | Performed by: INTERNAL MEDICINE

## 2025-07-25 PROCEDURE — 3079F DIAST BP 80-89 MM HG: CPT | Mod: CPTII,S$GLB,, | Performed by: INTERNAL MEDICINE

## 2025-07-25 RX ORDER — LENALIDOMIDE 10 MG/1
CAPSULE ORAL
COMMUNITY
Start: 2025-06-25

## 2025-07-25 NOTE — PROGRESS NOTES
Problem List: 66 yo man with   IgG lambda multiple myeloma   - Dx Oct 2024.... Bmbx with 95% plasmacytosis; Gain Chrom 9, 15,11q. Gain 1q21. Loss of 4p/FGFR3 and 16q/MAF    - - 10/15/24: PET CT with widely metastatic lytic bone lesions in the left scapula, right glenoid, left posterior 5th rib, right posterior 8th rib, and patchy lesions throughout spine and pelvis    - Korina Vrd with VGPR. 0.0027% MRD on March BMBxwith palliative XRT to hip    - S/p Jennifer 200 ASCT 2025 (4.39 x 10^6 CD34 stem cells)  CASHD (Ruptured placque 2010ish)   - Coronary stent in place   Hyperlipidemia  Velcade induced neuropathy      HPI: Mr Joya returns for follow up of multiple myeloma. This is his day 100 follow up visit. He comes in stating that he continues to recover from his stem cell transplant. He dioes not consider himself fully recovered. He has noted some ankle selling as well as numbness and tingling. The neuropathy afffects him to the point that it interferes with work and he has been unable to return to work. He is dependent on lyrica and duloxitine. He has received his maintenance supply of revlimid and he [facility]yrns to [facility] 4 for Korina. His appetite is good. No weight loss. He has had no falls. No fevers or chills reported. He is scheduled to see ID today.    Past Medical History: See Problem List  Social Hx: He was born and raised in [place]. He is  and splits homes between [place] and [place]. He has worked as a oilfield machine salesperson. He drinks occasionally. He does not smoke cigarettes. No illicit drugs  Family Hx: His father  at 64 from heart disease. His mother  at 96 from age related conditions. He has 3 brothers and 1 sister. One brother  of a work accident at 24. He has 2 daughters and 1 son who are healthy    ROS: See HPI. He notes mild weakness in hip flexors       /76 (BP Location: Left arm, Patient Position: Sitting)   Pulse 73   Temp 98.1 °F (36.7 °C) (Oral)   Resp  "12   Ht 5' 9" (1.753 m)   Wt 102.2 kg (225 lb 5 oz)   SpO2 99%   BMI 33.27 kg/m²   Gen: Awake and Alert, No Apparent Distress  HEENT: NCAT, PERRLA, EOMI,  Moist oral, nasal and ocular mucus membranes, OP Clear  Neck: Supple, No JVD or Adenopathy. Trachea is Midline, No Thyromegaly, No thyroid masses  Heart: Regular Rhythm and rate, No murmurs, gallops or rubs  Lungs: Symmetric chest excursions bilaterally. No use of accessory respiratory muscles. Lungs are clear to auscultation bilaterally  Abdomen: Soft, Nontender/Nondistended. There are normoactive bowel sounds. There is no hepatosplenomegaly  Extremities:  There is no peripheral edema. No joint deformities, joint effusions or joint tenderness in the ankles, knees, hips, shoulders, elbows, wristst and digits. There is no cyanosis. 2+ radial and dorsalis pedis pulses.   Back: Straight, No paraspinal tenderness or CVA tenderness  Skin: Intact. No sites of breakdown. No subcutaneous nodules. No rashes. No petechiae. No ecchymoses.  Lymphatics: No cervical, supraclavicular, axillary or inguinal adenopathy  Neurologic: Awake, alert and fully oriented. Cranial nerves II-XII intact.    Results for orders placed or performed during the hospital encounter of 07/16/25   Plasma Cell Proliferative Disorder (PCPD), FISH    Collection Time: 07/16/25 12:20 PM   Result Value Ref Range    Interpretation TNP    Multiple Myeloma MRD by Flow, BM    Collection Time: 07/16/25 12:20 PM   Result Value Ref Range    Minimal Residual Disease Detection, Bone Marrow 0.0000 %    PERCENT NORMAL PLASMA CELLS (OF TOTAL PC) 100.0 %    NON-AGGREGATE EVENTS 8433075.0     TOTAL PLASMA CELL EVENTS 452.0     POLY PLASMA CELL EVENTS 452.0     ABNORMAL PLASMA CELL EVENTS 0.0     Final Diagnosis SEE COMMENTS     % B-CELL PRECURSORS 5.275 %    % MAST CELLS 0.008 %    VALIDATED ASSAY SENSITIVITY 2.74 x10(-6)    LOWER LIMIT OF QUANTITATION (LLOQ) 1.0 x10(-5)    PATIENT / SAMPLE THEORETICAL LOQ 2.0 " x10(-6)   Heme Disorders DNA/RNA Hold -Bone Marrow    Collection Time: 07/16/25 12:20 PM   Result Value Ref Range    Specimen Type bone marrow     DNA/RNA Extract and Hold Result see method     DNA/RNA Extraction Performed    Leukemia/Lymphoma Screen - Bone Marrow Left Posterior Iliac Crest    Collection Time: 07/16/25 12:20 PM   Result Value Ref Range    Flow Cytometry Interpretation       A reversed CD4 to CD8 ratio T-cell population is detected.  See comment.      Interpreting Pathologist: Earline Funes MD          Flow Cytometry Comment       Flow cytometric analysis of bone marrow shows populations of polyclonal B lymphocytes and T lymphocytes that are immunophenotypically unremarkable except with a reversed CD4 to CD8 ratio and no significant loss of pan-T cell markers. The blast gate is not increased.  No CD38 positive or  positive plasma cells are detected.  A minute population of hematogones is also noted.  Flow Differential:  Lymphocyte gate 9.1%, Monocyte gate 6.3%, Granulocyte gate 78.0%, Blast gate 0.3%. Debris/nRBC gate 0.3%, Plasma Cells %, Viability 99.7%.      Antibodies Analyzed         7A-AD    CD10    CD13  CD19  CD20 CD2  CD34  CD38  CD3  CD45  CD4  CD56  CD5 CD7  CD8  cKappa  cLambda  [place]  Lambda         Disclaimer Statement       This test was developed and its performance characteristics determined by the [facility] Department of Pathology and Laboratory Medicine. It has not been cleared or approved by the U.S. Food and Drug Administration. The FDA has determined that such clearance or approval is not necessary. This test is used for clinical purposes. It should not be regarded as investigational or for research. This laboratory is certified under CLIA-88 as qualified to perform high complexity clinical laboratory testing.      PCPDS Pre-Analysis Cell Sorting, BM    Collection Time: 07/16/25 12:20 PM   Result Value Ref Range    PCPDS Pre-Analysis Cell Sort Performed     Specimen to Pathology, Bone Marrow Aspiration/Biopsy    Collection Time: 07/16/25 12:20 PM   Result Value Ref Range    Case Report       [facility] - Bone Marrow                        Case: [id number]                                Authorizing Provider:  Mariusz Grant PA-C     Collected:           07/16/2025 12:20 PM          Ordering Location:     [facility]    Received:            07/16/2025 01:18 PM                                 4th Floor                                                                    Pathologist:           Earline Funes MD                                                                Specimens:   1) - Bone Marrow Aspirate, Left Iliac Crest                                                         2) - Bone Marrow Clot, Left Iliac Crest                                                             3) - Bone Marrow Core Bx, Left Iliac Crest                                                 Clinical Information       day +99 s/p auto SCT MM      Final Diagnosis       LEFT ILIAC CREST BONE MARROW ASPIRATE, BONE MARROW CLOT, AND BONE MARROW CORE BIOPSY WITH:    CELLULARITY= 30-40%, TRILINEAGE HEMATOPOIETIC ACTIVITY (M/E= 2.4:1).  NO DEFINITIVE MORPHOLOGIC OR IMMUNOPHENOTYPIC EVIDENCE OF RESIDUAL PLASMA CELL NEOPLASM.  SEE COMMENT.  FOCAL GRADE 1 RETICULAR FIBROSIS.  DECREASED STORAGE IRON.  ADEQUATE NUMBER OF MEGAKARYOCYTES.                Comments       Immunohistochemical studies were performed on the clot and core biopsy for greater sensitivity and further architecture evaluation with adequate positive and negative controls. Mixed predominantly T cells (CD3 positive, CD5 positive) and B cells (CD20 positive, BCL6 negative, CD23 negative, cyclin D1 negative) are evident.  About 1% plasma cells ( positive) are noted.     See [facility] report:   Bone marrow, flow cytometric immunophenotyping: No monotypic plasma cells identified (MRD-negative).   Plasma cell proliferative  disorder, FISH:: Plasma Cell Prolif, High Risk, FISH was cancelled on 07/20/2025 at 14:30; Quantity was not sufficient for testing.     There is no definitive morphologic or immunophenotypic evidence of residual plasma cell neoplasm.  Correlate clinically.      Microscopic Description       BONE MARROW ASPIRATE DIFFERENTIAL 200 cells:  Blasts----------------------------------------------3 %  Promyelocytes---------------------------------2 %  Myelocytes--------------------------------------6 %  Metamyelocytes------------------------------ 9 %  Bands----------------------------------------------13 %  PMN Neutrophils------------------------------23 %  Eosinophils---------------------------------------6 %  Basophils-----------------------------------------1 %  Monocytes---------------------------------------2.5 %  Lymphocytes------------------------------------6.5 %  Plasma cells--------------------------------------0.5 %  Erythroid precursors---------------------------27.5 %    BONE MARROW ASPIRATE:  Myeloid and erythroid maturation shows M:E ratio at 2.4:1  No significant dysplasia is evident.  Stainable iron is decreased without increased ringed sideroblasts.  Megakaryocytes are seen.    BONE MARROW CLOT:  Cellularity is 30-40 % with trilineage hematopoiesis.  No abnormal infiltrates are seen.  Megakaryocytes are adequate in number.  Stainable iron is decreased.    BONE MARROW CORE BIOPSY:  Cellularity is 30 %.  No abnormal infiltrates are seen.  Stainable iron is not identified.  Megakaryocytes are adequate in number.  Focal grade 1 reticular fibrosis is evident by special stain (reticulin) with adequate positive control.      CBC Result       CBC DATA 07/16/2025:    RBC:  4.17 M/UL,  H/H :  13.3/41.4 ,  MCV :  99 FL, WBC:  7.34 K/UL,  Gran   40.3 %, Lymph  42.1 %, Mono  13.8 %, Eosinophil  3 %, Basophil  0.7 %,   Platelet:  225 K/UL.          Peripheral Smear Result       No peripheral blood smear was submitted for  "evaluation.  She needs      Flow Cytometry Result       Flow cytometric analysis of bone marrow shows populations of polyclonal B lymphocytes and T lymphocytes that are immunophenotypically unremarkable except with a reversed CD4 to CD8 ratio and no significant loss of pan-T cell markers. The blast gate is not increased.  No CD38 positive or  positive plasma cells are detected.  A minute population of hematogones is also noted.  Flow Differential:  Lymphocyte gate 9.1%, Monocyte gate 6.3%, Granulocyte gate 78.0%, Blast gate 0.3%. Debris/nRBC gate 0.3%, Plasma Cells %, Viability 99.7%.      Gross Description       2. Bone Marrow Clot, Left Iliac Crest:   Two Part Case:    Part 2  Pathology MRN# [id number]  [facility] MRN# [id number]    Received in formalin labeled with the patient's name, MRN, and "clot is a 1.5 x 1.1 x 0.5 cm aggregate of dark red clot.  The specimen is formalin filtered and entirely submitted in cassettes:  2A    MICKIE Mcgrath PA (ASCP)ABDULAZIZ    Grossing was completed by Benigno Farrar on 7/16/2025.    3. Bone Marrow Core Bx, Left Iliac Crest:   Part 3  Pathology MRN# [id number]  [facility] MRN# [id number]    Received in formalin labeled with the patient's name, MRN, and "core is a 0.9 x 0.2 cm tan-brown bone core.  The specimen is placed in immunocal and submitted in toto in cassette:  3A    MICKIE Mcgrath PA (ASCP)ABDULAZIZ    Grossing was completed by Benigno Farrar on 7/16/2025.        Report Footnotes         CD20 (L26) immunohistochemical staining (clone L26, DAB detection method) is performed on formalin-fixed (10% neutral buffered formalin), paraffin embedded tissues sections. The presence of an appropriately colored reaction product within the target cells is indicative of positive reactivity. Positive staining intensity should be assessed within the context of any background staining of the negative reagent control. This test was developed and performance characteristics determined by [facility], Section of Anatomic Pathology. It has been cleared by the U.S. Food and Drug Administration.       Performing Location       Grossing performed at [facility], [address] [place] [address], [id number]      Sign out performed at [facility], [address] [place] [address], [id number]     CT/PET:   Scattered osseous lytic lesions with stable to slightly increased tracer uptake when compared to prior PET-CT. No new osseous lesions identified.     Multiple Myeloma   - S/p Auto ASCT with CR noted with 0.00% MRD   - Proceed with maintenance rev/damon   - Agree with Revaccination protocol through ID   - Cont prophylactic antimicrobials.

## 2025-07-25 NOTE — PATIENT INSTRUCTIONS
Recommended Respiratory Vaccines Time Post Transplant    >=3-6 months   Estimated Date Due    Seasonal High-Dose Influenza   Sept - March  Continue yearly for life   Will receive at local pharmacy during flu season   COVID-19 mRNA Vaccine   Moderna or Pfizer-BioNTech  Frequency based on updated CDC Guidelines   Patient declines   Respiratory Syncytial Virus  Arexvy®  At retail pharmacy, may only be covered if age >= 60   Will receive at local pharmacy     Recommended Inactivated Vaccines Time Post Transplant    >= 6 months >= 8 months >= 10 months >= 12 months   Estimated Date Due 10/8/25 12/8/25 2/8/26 4/8/26   Pneumococcal Conjugate  Hmlepkz39® 1 of 4 2 of 4 3 of 4 4 of 4          Diphtheria-Tetanus-acellular Pertussis1   DTaP or TDaP 1 of 3 2 of 3 3 of 3             Haemophilus influenzae Type B1    HiB 1 of 3 2 of 3 3 of 3             Inactive Polio1    IPV 1 of 3 2 of 3 3 of 3           Hepatitis A2  Havrix® or Vaqta® 1 of 2  2 of 2         Hepatitis B2  Engerix-B®or Recombivax HB® 1 of 3 2 of 3  3 of 3              Shingrix*  Zoster vaccine, recombinant adjuvanted Time Post Transplant   Autologous Patients >= 6 months >= 8 months   Estimated Date Due 10/8/25 12/8/25    1 of 2 2 of 2          Live Attenuated Virus Vaccines  Must meet 2-1-5 rule (>=2 years post transplant, >=1 year off immunosuppression, >=5 months since IVIG)  Requires Infectious Diseases clearance Time Post Transplant    >= 24 months >= 25 months   Estimated Date Due Earliest administration 4/8/27    Measles-Mumps--Rubella  MMR 1 of 2 2 of 2

## 2025-07-25 NOTE — PROGRESS NOTES
POST-HSCT VACCINATION ENCOUNTER    Subjective:     Patient ID: Mitchel Joya is a 65 y.o. male    CC: BMT vaccine update    [facility] oncologist: Emelyn  [facility] oncologist: Bebo    Transplant: Autologous stem cell transplant  Date of Transplant: 4/8/25  Current therapy: revlimid starting on 8/4  Prophylaxis:  acyclovir and bactrim      HPI: Mitchel Joya is a 65 y.o. male presenting for post-stem cell transplant vaccination update. Patient denies any acute complaints today.  Complains of some neuropathy in b/l LE      Review of Systems   All other systems reviewed and are negative.       Past Medical History:   Diagnosis Date    Coronary artery disease     Hyperlipidemia        Past Surgical History:   Procedure Laterality Date    BONE MARROW BIOPSY Right 3/5/2025    Procedure: Biopsy-bone marrow;  Surgeon: Kevin Hemphill MD;  Location: [facility] (2ND [facility]);  Service: Oncology;  Laterality: Right;    BONE MARROW BIOPSY N/A 7/16/2025    Procedure: Biopsy-bone marrow;  Surgeon: Kevin Hemphill MD;  Location: [facility] (4TH FLR);  Service: Oncology;  Laterality: N/A;    CHOLECYSTECTOMY      heart stint      KNEE SURGERY      left    PLACEMENT OF DUAL-LUMEN VASCULAR CATHETER Right 3/28/2025    Procedure: INSERTION-CATHETER-MONA: Double Lumen, Bard 14.5 Fr. Hemosplit Cath, Model# [id number], Right vs Left;  Surgeon: Miguel Lawson MD;  Location: [facility] OR [address] [place] [address];  Service: General;  Laterality: Right;    SHOULDER SURGERY      SPINE SURGERY         Family History   Problem Relation Name Age of Onset    No Known Problems Mother      Heart disease Father         Social History[1]    Immunization History   Administered Date(s) Administered    Influenza - Trivalent - Fluzone High Dose - PF (65 years and older) 10/30/2024    Influenza Whole 10/26/2017    Pneumococcal Conjugate - 20 Valent 07/17/2024    Tdap 07/17/2024        Objective:     Physical Exam  Constitutional:       General: He is not in acute  distress.     Appearance: Normal appearance. He is well-developed. He is not ill-appearing or diaphoretic.   HENT:      Head: Normocephalic and atraumatic.      Right Ear: External ear normal.      Left Ear: External ear normal.      Nose: Nose normal.   Eyes:      General: No scleral icterus.        Right eye: No discharge.         Left eye: No discharge.      Extraocular Movements: Extraocular movements intact.      Conjunctiva/sclera: Conjunctivae normal.   Pulmonary:      Effort: Pulmonary effort is normal. No respiratory distress.      Breath sounds: No stridor.   Skin:     General: Skin is dry.      Coloration: Skin is not jaundiced or pale.      Findings: No erythema.   Neurological:      General: No focal deficit present.      Mental Status: He is alert and oriented to person, place, and time. Mental status is at baseline.   Psychiatric:         Mood and Affect: Mood normal.         Behavior: Behavior normal.         Thought Content: Thought content normal.         Judgment: Judgment normal.           Data:    All data, including recent labs, radiology, and pathology, has been independently reviewed.      Labs:    Recent Labs   Lab Result Units 04/30/25  0728 05/05/25  0714 06/12/25  1027 07/16/25  0813   WBC K/uL 5.63 6.86 7.45 7.34   HGB gm/dL 11.4* 11.7* 12.8* 13.3*   HCT % 34.3* 36.9* 40.1 41.4   Sodium mmol/L 139 142 138 142   Potassium mmol/L 4.2 3.6 3.9 4.3   Chloride mmol/L 106 108 105 110   BUN mg/dL 9 7* 9 15   Creatinine mg/dL 0.7 0.7 0.8 0.8   AST unit/L 24 25 22 21   ALT unit/L 35 31 27 21   ALP unit/L 87 84 80 77   Bilirubin Total mg/dL 0.2 0.2 0.2 0.2   INR  1.1  --   --   --           Radiology:    No results found in the last 24 hours.       Assessment:    1. History of autologous stem cell transplant  DTaP / HiB / IPV combined (Pentacel) injection 0.5 mL    pneumoc 20-marlene conj-dip cr(PF) (PREVNAR-20 (PF)) injection Syrg 0.5 mL    varicella-zoster gE vac,2 of 2 SusR 0.5 mL    hepatitis A and  B vaccine (PF) 720 JODEE unit- 20 mcg/mL suspension 720 Units             Recommended Respiratory Vaccines Time Post Transplant    >=3-6 months   Estimated Date Due    Seasonal High-Dose Influenza   Sept - March  Continue yearly for life   Will receive at local pharmacy during flu season   COVID-19 mRNA Vaccine   Moderna or Pfizer-BioNTech  Frequency based on updated CDC Guidelines   Patient declines   Respiratory Syncytial Virus  Arexvy®  At retail pharmacy, may only be covered if age >= 60   Will receive at local pharmacy     Recommended Inactivated Vaccines Time Post Transplant    >= 6 months >= 8 months >= 10 months >= 12 months   Estimated Date Due 10/8/25 12/8/25 2/8/26 4/8/26   Pneumococcal Conjugate  [id number]® 1 of 4 2 of 4 3 of 4 4 of 4          Diphtheria-Tetanus-acellular Pertussis1   DTaP or TDaP 1 of 3 2 of 3 3 of 3             Haemophilus influenzae Type B1    HiB 1 of 3 2 of 3 3 of 3             Inactive Polio1    IPV 1 of 3 2 of 3 3 of 3           Hepatitis A2  Havrix® or Vaqta® 1 of 2  2 of 2         Hepatitis B2  Engerix-B®or Recombivax HB® 1 of 3 2 of 3  3 of 3              Shingrix*  Zoster vaccine, recombinant adjuvanted Time Post Transplant   Autologous Patients >= 6 months >= 8 months   Estimated Date Due 10/8/25 12/8/25    1 of 2 2 of 2          Live Attenuated Virus Vaccines  Must meet 2-1-5 rule (>=2 years post transplant, >=1 year off immunosuppression, >=5 months since IVIG)  Requires Infectious Diseases clearance Time Post Transplant    >= 24 months >= 25 months   Estimated Date Due Earliest administration 4/8/27    Measles-Mumps--Rubella  MMR 1 of 2 2 of 2          Follow up in 1 year    The total time for evaluation and management services performed on 7/25/25 was greater than 45 minutes.    Visit today included increased complexity associated with the care of the episodic problem addressed and managing the longitudinal care of the patient due to the serious and/or complex managed  problem(s).     Dianelys Car DO  Transplant Infectious Disease         [1]   Social History  Tobacco Use    Smoking status: Never    Smokeless tobacco: Never   Vaping Use    Vaping status: Never Used   Substance Use Topics    Alcohol use: Yes     Comment: occ    Drug use: Yes     Types: Marijuana

## 2025-07-28 ENCOUNTER — PATIENT MESSAGE (OUTPATIENT)
Dept: INFECTIOUS DISEASES | Facility: CLINIC | Age: 65
End: 2025-07-28
Payer: COMMERCIAL

## (undated) DEVICE — DRESSING ANTIMICROBIAL 1 INCH

## (undated) DEVICE — DRAPE C-ARM ELAS CLIP 42X120IN

## (undated) DEVICE — SYR DISP LL 5CC

## (undated) DEVICE — CONTAINER SPECIMEN OR STER 4OZ

## (undated) DEVICE — TRAY MINOR GEN SURG OMC

## (undated) DEVICE — NDL HYPO REG 25G X 1 1/2

## (undated) DEVICE — SYR ONLY LUER LOCK 20CC

## (undated) DEVICE — APPLICATOR CHLORAPREP ORN 26ML

## (undated) DEVICE — TIP YANKAUERS BULB NO VENT

## (undated) DEVICE — DRAPE T THYROID STERILE

## (undated) DEVICE — BLADE SURG CARBON STEEL SZ11

## (undated) DEVICE — SUT PROLENE 2-0 30 SH

## (undated) DEVICE — SUT MCRYL PLUS 4-0 PS2 27IN

## (undated) DEVICE — PENCIL ROCKER SWITCH 10FT CORD

## (undated) DEVICE — ELECTRODE MEGADYNE RETURN DUAL

## (undated) DEVICE — SYR 10CC LUER LOCK

## (undated) DEVICE — SOL NACL 0.9% INJ PF/50151

## (undated) DEVICE — ELECTRODE REM PLYHSV RETURN 9

## (undated) DEVICE — DECANTER FLUID TRNSF WHITE 9IN

## (undated) DEVICE — ADHESIVE DERMABOND ADVANCED